# Patient Record
Sex: FEMALE | Race: BLACK OR AFRICAN AMERICAN | NOT HISPANIC OR LATINO | Employment: OTHER | URBAN - METROPOLITAN AREA
[De-identification: names, ages, dates, MRNs, and addresses within clinical notes are randomized per-mention and may not be internally consistent; named-entity substitution may affect disease eponyms.]

---

## 2018-04-11 DIAGNOSIS — G72.9 CERVICAL MYOPATHY: Primary | ICD-10-CM

## 2018-04-11 DIAGNOSIS — Z12.39 BREAST CANCER SCREENING: Primary | ICD-10-CM

## 2018-04-18 ENCOUNTER — HOSPITAL ENCOUNTER (OUTPATIENT)
Dept: RADIOLOGY | Facility: HOSPITAL | Age: 78
Discharge: HOME | End: 2018-04-18
Attending: INTERNAL MEDICINE
Payer: MEDICARE

## 2018-04-18 DIAGNOSIS — G72.9 CERVICAL MYOPATHY: ICD-10-CM

## 2018-04-18 DIAGNOSIS — Z12.39 BREAST CANCER SCREENING: ICD-10-CM

## 2018-04-18 PROCEDURE — 77067 SCR MAMMO BI INCL CAD: CPT | Mod: TC

## 2018-04-18 PROCEDURE — 72050 X-RAY EXAM NECK SPINE 4/5VWS: CPT | Mod: TC,FY

## 2018-04-18 PROCEDURE — 72050 X-RAY EXAM NECK SPINE 4/5VWS: CPT | Mod: 26,,, | Performed by: RADIOLOGY

## 2018-04-18 PROCEDURE — 77067 SCR MAMMO BI INCL CAD: CPT | Mod: 26,,, | Performed by: RADIOLOGY

## 2018-04-18 PROCEDURE — 77063 BREAST TOMOSYNTHESIS BI: CPT | Mod: 26,,, | Performed by: RADIOLOGY

## 2019-04-12 DIAGNOSIS — Z12.39 SCREENING BREAST EXAMINATION: Primary | ICD-10-CM

## 2019-04-23 ENCOUNTER — HOSPITAL ENCOUNTER (OUTPATIENT)
Dept: RADIOLOGY | Facility: HOSPITAL | Age: 79
Discharge: HOME | End: 2019-04-23
Attending: INTERNAL MEDICINE
Payer: MEDICARE

## 2019-04-23 DIAGNOSIS — Z12.39 SCREENING BREAST EXAMINATION: ICD-10-CM

## 2019-04-23 PROCEDURE — 77067 SCR MAMMO BI INCL CAD: CPT | Mod: TC

## 2019-04-23 PROCEDURE — 77067 MAMMO DIGITAL SCREENING BILAT WITH CAD: ICD-10-PCS | Mod: 26,,, | Performed by: RADIOLOGY

## 2019-04-23 PROCEDURE — 77067 SCR MAMMO BI INCL CAD: CPT | Mod: 26,,, | Performed by: RADIOLOGY

## 2020-06-23 ENCOUNTER — LAB VISIT (OUTPATIENT)
Dept: PRIMARY CARE CLINIC | Facility: OTHER | Age: 80
End: 2020-06-23
Payer: MEDICARE

## 2020-06-23 DIAGNOSIS — Z03.818 ENCOUNTER FOR OBSERVATION FOR SUSPECTED EXPOSURE TO OTHER BIOLOGICAL AGENTS RULED OUT: ICD-10-CM

## 2020-06-23 PROCEDURE — U0003 INFECTIOUS AGENT DETECTION BY NUCLEIC ACID (DNA OR RNA); SEVERE ACUTE RESPIRATORY SYNDROME CORONAVIRUS 2 (SARS-COV-2) (CORONAVIRUS DISEASE [COVID-19]), AMPLIFIED PROBE TECHNIQUE, MAKING USE OF HIGH THROUGHPUT TECHNOLOGIES AS DESCRIBED BY CMS-2020-01-R: HCPCS

## 2020-06-25 ENCOUNTER — OFFICE VISIT (OUTPATIENT)
Dept: FAMILY MEDICINE | Facility: HOSPITAL | Age: 80
End: 2020-06-25
Attending: SPECIALIST
Payer: MEDICARE

## 2020-06-25 VITALS
SYSTOLIC BLOOD PRESSURE: 157 MMHG | HEART RATE: 96 BPM | BODY MASS INDEX: 32.1 KG/M2 | DIASTOLIC BLOOD PRESSURE: 86 MMHG | HEIGHT: 61 IN | WEIGHT: 170 LBS

## 2020-06-25 DIAGNOSIS — M19.012 LOCALIZED OSTEOARTHRITIS OF SHOULDER REGIONS, BILATERAL: ICD-10-CM

## 2020-06-25 DIAGNOSIS — E66.3 OVERWEIGHT: ICD-10-CM

## 2020-06-25 DIAGNOSIS — Z12.31 ENCOUNTER FOR SCREENING MAMMOGRAM FOR MALIGNANT NEOPLASM OF BREAST: Primary | ICD-10-CM

## 2020-06-25 DIAGNOSIS — M19.011 LOCALIZED OSTEOARTHRITIS OF SHOULDER REGIONS, BILATERAL: ICD-10-CM

## 2020-06-25 DIAGNOSIS — Z00.00 ANNUAL PHYSICAL EXAM: ICD-10-CM

## 2020-06-25 DIAGNOSIS — R68.89 OTHER GENERAL SYMPTOMS AND SIGNS: ICD-10-CM

## 2020-06-25 PROCEDURE — 99213 OFFICE O/P EST LOW 20 MIN: CPT | Performed by: STUDENT IN AN ORGANIZED HEALTH CARE EDUCATION/TRAINING PROGRAM

## 2020-06-25 RX ORDER — CLOTRIMAZOLE AND BETAMETHASONE DIPROPIONATE 10; .5 MG/ML; MG/ML
LOTION TOPICAL 2 TIMES DAILY
COMMUNITY
End: 2022-03-07

## 2020-06-25 RX ORDER — CELECOXIB 100 MG/1
100 CAPSULE ORAL 2 TIMES DAILY
COMMUNITY
End: 2020-06-25 | Stop reason: SDUPTHER

## 2020-06-25 RX ORDER — ASPIRIN 81 MG/1
81 TABLET ORAL DAILY
COMMUNITY

## 2020-06-25 RX ORDER — CELECOXIB 100 MG/1
100 CAPSULE ORAL 2 TIMES DAILY
Qty: 180 CAPSULE | Refills: 3 | Status: SHIPPED | OUTPATIENT
Start: 2020-06-25 | End: 2021-03-09

## 2020-06-25 NOTE — PROGRESS NOTES
Subjective:       Patient ID: Kassandra Rodriguez is a 79 y.o. female.    Chief Complaint: breast cancer screening    80 yo female with bilateral shoulder osteoarthritis presenting for mammogram orders. Patient was a patient of Dr. Laughlin but is transferring as Dr. Laughlin is moving to [facility] and patient would like to remain in [place]. Patient takes Celebrex daily and it is the only medication that improves her arthritis pain in her bilateral shoulders. 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     Patient complains about urinary incontinence. Patient has to wear pads due to incontinence. Patient also reports a rash in her inner labial area that Dr. Laughlin started a fungal cream on.     Colonoscopy: Patient reports last colonoscopy was normal and GI cleared her  Mammogram: No abnormal mammogram, patient would like to continue to have have mammograms  Pap Smear: No history of abnormal pap    Not currently sexually active since her  passed away  Has 7 children all .     Review of Systems   Constitutional: Negative for chills, fatigue and fever.   HENT: Negative for rhinorrhea and sinus pain.    Eyes: Negative for photophobia and visual disturbance.   Respiratory: Negative for cough and shortness of breath.    Cardiovascular: Negative for chest pain, palpitations and leg swelling.   Gastrointestinal: Negative for abdominal pain, constipation, diarrhea, nausea and vomiting.   Endocrine: Negative for polyuria.   Genitourinary: Negative for dysuria, frequency, hematuria and urgency.   Musculoskeletal: Negative for arthralgias and gait problem.   Skin: Negative for rash.   Neurological: Negative for syncope, weakness and headaches.   Psychiatric/Behavioral: Negative for agitation and confusion.       Objective:      Vitals:    20 1324   BP: (!) 157/86   Pulse: 96     Physical Exam  Vitals signs and nursing note reviewed. Exam conducted with a  chaperone present.   Constitutional:       Appearance: She is well-developed.   HENT:      Head: Normocephalic and atraumatic.   Eyes:      Conjunctiva/sclera: Conjunctivae normal.      Pupils: Pupils are equal, round, and reactive to light.   Neck:      Musculoskeletal: Normal range of motion and neck supple.   Cardiovascular:      Rate and Rhythm: Normal rate and regular rhythm.   Pulmonary:      Effort: Pulmonary effort is normal. No respiratory distress.      Breath sounds: Normal breath sounds. No stridor. No wheezing.   Abdominal:      General: Bowel sounds are normal. There is no distension.      Palpations: Abdomen is soft. There is no mass.      Tenderness: There is no abdominal tenderness. There is no guarding.   Genitourinary:     Labia:         Right: No rash, tenderness or injury.         Left: No rash or tenderness.    Musculoskeletal: Normal range of motion.         General: No tenderness or deformity.   Skin:     General: Skin is warm and dry.      Capillary Refill: Capillary refill takes less than 2 seconds.   Neurological:      Mental Status: She is alert and oriented to person, place, and time.         Assessment:       1. Encounter for screening mammogram for malignant neoplasm of breast     2. Localized osteoarthritis of shoulder regions, bilateral    3. Annual physical exam    4. Body mass index (bmi) 32.0-32.9, adult     5. Other general symptoms and signs     6. Overweight         Plan:       Encounter for screening mammogram for malignant neoplasm of breast   -     Mammo Digital Screening Bilateral With CAD; Future; Expected date: 06/25/2020    Localized osteoarthritis of shoulder regions, bilateral  -     celecoxib (CELEBREX) 100 MG capsule; Take 1 capsule (100 mg total) by mouth 2 (two) times daily.  Dispense: 180 capsule; Refill: 3    Annual physical exam  -     CBC auto differential; Future; Expected date: 06/25/2020  -     Comprehensive metabolic panel; Future; Expected date:  06/25/2020  -     TSH; Future; Expected date: 06/25/2020  -     Lipid Panel; Future; Expected date: 06/25/2020    Body mass index (bmi) 32.0-32.9, adult   -     CBC auto differential; Future; Expected date: 06/25/2020    Other general symptoms and signs   -     TSH; Future; Expected date: 06/25/2020    Overweight   -     Lipid Panel; Future; Expected date: 06/25/2020      Follow up in about 6 months (around 12/25/2020). Discussed extensively the risk associated with NSAIDs use in the patient's age population. Patient would like to continue NSAIDs and reports that she eats prior to taking her medication. Patient will have labs completed before next appointment. No rashes were seen on vaginal exam. Patient most likely just has atrophic vaginitis. Discussed with patient to discontinue the ointment and if she has a rash again to return to clinic.

## 2020-06-26 LAB — SARS-COV-2 RNA RESP QL NAA+PROBE: NOT DETECTED

## 2020-07-15 ENCOUNTER — HOSPITAL ENCOUNTER (OUTPATIENT)
Dept: RADIOLOGY | Facility: HOSPITAL | Age: 80
Discharge: HOME | End: 2020-07-15
Attending: STUDENT IN AN ORGANIZED HEALTH CARE EDUCATION/TRAINING PROGRAM
Payer: MEDICARE

## 2020-07-15 DIAGNOSIS — Z12.31 ENCOUNTER FOR SCREENING MAMMOGRAM FOR MALIGNANT NEOPLASM OF BREAST: ICD-10-CM

## 2020-07-15 PROCEDURE — 77067 SCR MAMMO BI INCL CAD: CPT | Mod: TC

## 2020-07-15 PROCEDURE — 77067 SCR MAMMO BI INCL CAD: CPT | Mod: 26,,, | Performed by: RADIOLOGY

## 2020-07-15 PROCEDURE — 77063 MAMMO DIGITAL SCREENING BILAT WITH TOMOSYNTHESIS_CAD: ICD-10-PCS | Mod: 26,,, | Performed by: RADIOLOGY

## 2020-07-15 PROCEDURE — 77063 BREAST TOMOSYNTHESIS BI: CPT | Mod: 26,,, | Performed by: RADIOLOGY

## 2020-07-15 PROCEDURE — 77067 MAMMO DIGITAL SCREENING BILAT WITH TOMOSYNTHESIS_CAD: ICD-10-PCS | Mod: 26,,, | Performed by: RADIOLOGY

## 2020-08-11 ENCOUNTER — TELEPHONE (OUTPATIENT)
Dept: FAMILY MEDICINE | Facility: HOSPITAL | Age: 80
End: 2020-08-11

## 2020-08-11 DIAGNOSIS — M81.0 AGE-RELATED OSTEOPOROSIS WITHOUT CURRENT PATHOLOGICAL FRACTURE: ICD-10-CM

## 2020-08-11 DIAGNOSIS — Z13.820 OSTEOPOROSIS SCREENING: Primary | ICD-10-CM

## 2020-08-11 NOTE — TELEPHONE ENCOUNTER
----- Message from Sara Greer sent at 8/11/2020  9:35 AM CDT -----  Pt would like a bone density test done. Please put in order and call pt back [phone]

## 2020-08-14 ENCOUNTER — HOSPITAL ENCOUNTER (OUTPATIENT)
Dept: RADIOLOGY | Facility: HOSPITAL | Age: 80
Discharge: HOME | End: 2020-08-14
Attending: STUDENT IN AN ORGANIZED HEALTH CARE EDUCATION/TRAINING PROGRAM
Payer: MEDICARE

## 2020-08-14 DIAGNOSIS — M81.0 AGE-RELATED OSTEOPOROSIS WITHOUT CURRENT PATHOLOGICAL FRACTURE: ICD-10-CM

## 2020-08-14 DIAGNOSIS — Z13.820 OSTEOPOROSIS SCREENING: ICD-10-CM

## 2020-08-14 PROCEDURE — 77080 DXA BONE DENSITY AXIAL: CPT | Mod: TC

## 2020-08-14 PROCEDURE — 77080 DXA BONE DENSITY AXIAL: CPT | Mod: 26,,, | Performed by: RADIOLOGY

## 2020-08-14 PROCEDURE — 77080 DEXA BONE DENSITY SPINE HIP: ICD-10-PCS | Mod: 26,,, | Performed by: RADIOLOGY

## 2020-08-31 ENCOUNTER — TELEPHONE (OUTPATIENT)
Dept: FAMILY MEDICINE | Facility: HOSPITAL | Age: 80
End: 2020-08-31

## 2020-08-31 NOTE — TELEPHONE ENCOUNTER
I called patient with DEXA scan results showing osteopenia. Discussed started bisphosphonate vs Vitamin D and Calcium. Patient will start Vitamin D and Calcium. Will hold bisphosphonate for now.

## 2020-10-15 ENCOUNTER — OFFICE VISIT (OUTPATIENT)
Dept: FAMILY MEDICINE | Facility: HOSPITAL | Age: 80
End: 2020-10-15
Payer: MEDICARE

## 2020-10-15 VITALS
WEIGHT: 157.44 LBS | SYSTOLIC BLOOD PRESSURE: 133 MMHG | DIASTOLIC BLOOD PRESSURE: 76 MMHG | BODY MASS INDEX: 29.72 KG/M2 | HEART RATE: 83 BPM | HEIGHT: 61 IN

## 2020-10-15 DIAGNOSIS — H66.91 ACUTE INFECTION OF RIGHT EAR: Primary | ICD-10-CM

## 2020-10-15 PROCEDURE — 99213 OFFICE O/P EST LOW 20 MIN: CPT | Performed by: STUDENT IN AN ORGANIZED HEALTH CARE EDUCATION/TRAINING PROGRAM

## 2020-10-15 RX ORDER — AZITHROMYCIN 250 MG/1
TABLET, FILM COATED ORAL
Qty: 6 TABLET | Refills: 0 | Status: SHIPPED | OUTPATIENT
Start: 2020-10-15 | End: 2021-03-09

## 2020-10-15 RX ORDER — INFLUENZA A VIRUS A/MICHIGAN/45/2015 X-275 (H1N1) ANTIGEN (FORMALDEHYDE INACTIVATED), INFLUENZA A VIRUS A/SINGAPORE/INFIMH-16-0019/2016 IVR-186 (H3N2) ANTIGEN (FORMALDEHYDE INACTIVATED), INFLUENZA B VIRUS B/PHUKET/3073/2013 ANTIGEN (FORMALDEHYDE INACTIVATED), AND INFLUENZA B VIRUS B/MARYLAND/15/2016 BX-69A ANTIGEN (FORMALDEHYDE INACTIVATED) 60; 60; 60; 60 UG/.7ML; UG/.7ML; UG/.7ML; UG/.7ML
INJECTION, SUSPENSION INTRAMUSCULAR
COMMUNITY
Start: 2020-09-09 | End: 2021-03-09

## 2020-10-15 RX ORDER — METHYLPREDNISOLONE 4 MG/1
TABLET ORAL
Qty: 1 PACKAGE | Refills: 0 | Status: SHIPPED | OUTPATIENT
Start: 2020-10-15 | End: 2021-03-09

## 2021-01-09 ENCOUNTER — IMMUNIZATION (OUTPATIENT)
Dept: INTERNAL MEDICINE | Facility: CLINIC | Age: 81
End: 2021-01-09
Payer: MEDICARE

## 2021-01-09 DIAGNOSIS — Z23 NEED FOR VACCINATION: ICD-10-CM

## 2021-01-09 PROCEDURE — 91300 COVID-19, MRNA, LNP-S, PF, 30 MCG/0.3 ML DOSE VACCINE: CPT | Mod: PBBFAC | Performed by: FAMILY MEDICINE

## 2021-01-30 ENCOUNTER — IMMUNIZATION (OUTPATIENT)
Dept: INTERNAL MEDICINE | Facility: CLINIC | Age: 81
End: 2021-01-30
Payer: MEDICARE

## 2021-01-30 DIAGNOSIS — Z23 NEED FOR VACCINATION: Primary | ICD-10-CM

## 2021-01-30 PROCEDURE — 0002A COVID-19, MRNA, LNP-S, PF, 30 MCG/0.3 ML DOSE VACCINE: CPT | Mod: PBBFAC | Performed by: FAMILY MEDICINE

## 2021-01-30 PROCEDURE — 91300 COVID-19, MRNA, LNP-S, PF, 30 MCG/0.3 ML DOSE VACCINE: CPT | Mod: PBBFAC | Performed by: FAMILY MEDICINE

## 2021-02-11 ENCOUNTER — LAB VISIT (OUTPATIENT)
Dept: LAB | Facility: HOSPITAL | Age: 81
End: 2021-02-11
Attending: STUDENT IN AN ORGANIZED HEALTH CARE EDUCATION/TRAINING PROGRAM
Payer: MEDICARE

## 2021-02-11 DIAGNOSIS — R68.89 OTHER GENERAL SYMPTOMS AND SIGNS: ICD-10-CM

## 2021-02-11 DIAGNOSIS — Z00.00 ANNUAL PHYSICAL EXAM: ICD-10-CM

## 2021-02-11 DIAGNOSIS — E66.3 OVERWEIGHT: ICD-10-CM

## 2021-02-11 LAB
ALBUMIN SERPL BCP-MCNC: 3.5 G/DL (ref 3.5–5.2)
ALP SERPL-CCNC: 81 U/L (ref 55–135)
ALT SERPL W/O P-5'-P-CCNC: 14 U/L (ref 10–44)
ANION GAP SERPL CALC-SCNC: 8 MMOL/L (ref 8–16)
AST SERPL-CCNC: 19 U/L (ref 10–40)
BASOPHILS # BLD AUTO: 0.07 K/UL (ref 0–0.2)
BASOPHILS NFR BLD: 1.1 % (ref 0–1.9)
BILIRUB SERPL-MCNC: 0.4 MG/DL (ref 0.1–1)
BUN SERPL-MCNC: 12 MG/DL (ref 8–23)
CALCIUM SERPL-MCNC: 8.8 MG/DL (ref 8.7–10.5)
CHLORIDE SERPL-SCNC: 106 MMOL/L (ref 95–110)
CHOLEST SERPL-MCNC: 215 MG/DL (ref 120–199)
CHOLEST/HDLC SERPL: 4.5 {RATIO} (ref 2–5)
CO2 SERPL-SCNC: 27 MMOL/L (ref 23–29)
CREAT SERPL-MCNC: 0.7 MG/DL (ref 0.5–1.4)
DIFFERENTIAL METHOD: ABNORMAL
EOSINOPHIL # BLD AUTO: 0.4 K/UL (ref 0–0.5)
EOSINOPHIL NFR BLD: 6 % (ref 0–8)
ERYTHROCYTE [DISTWIDTH] IN BLOOD BY AUTOMATED COUNT: 13.6 % (ref 11.5–14.5)
EST. GFR  (AFRICAN AMERICAN): >60 ML/MIN/1.73 M^2
EST. GFR  (NON AFRICAN AMERICAN): >60 ML/MIN/1.73 M^2
GLUCOSE SERPL-MCNC: 87 MG/DL (ref 70–110)
HCT VFR BLD AUTO: 40.4 % (ref 37–48.5)
HDLC SERPL-MCNC: 48 MG/DL (ref 40–75)
HDLC SERPL: 22.3 % (ref 20–50)
HGB BLD-MCNC: 13.3 G/DL (ref 12–16)
IMM GRANULOCYTES # BLD AUTO: 0.02 K/UL (ref 0–0.04)
IMM GRANULOCYTES NFR BLD AUTO: 0.3 % (ref 0–0.5)
LDLC SERPL CALC-MCNC: 153 MG/DL (ref 63–159)
LYMPHOCYTES # BLD AUTO: 3.5 K/UL (ref 1–4.8)
LYMPHOCYTES NFR BLD: 54 % (ref 18–48)
MCH RBC QN AUTO: 32.3 PG (ref 27–31)
MCHC RBC AUTO-ENTMCNC: 32.9 G/DL (ref 32–36)
MCV RBC AUTO: 98 FL (ref 82–98)
MONOCYTES # BLD AUTO: 0.6 K/UL (ref 0.3–1)
MONOCYTES NFR BLD: 9.9 % (ref 4–15)
NEUTROPHILS # BLD AUTO: 1.9 K/UL (ref 1.8–7.7)
NEUTROPHILS NFR BLD: 28.7 % (ref 38–73)
NONHDLC SERPL-MCNC: 167 MG/DL
NRBC BLD-RTO: 0 /100 WBC
PLATELET # BLD AUTO: 222 K/UL (ref 150–350)
PMV BLD AUTO: 10.9 FL (ref 9.2–12.9)
POTASSIUM SERPL-SCNC: 4.3 MMOL/L (ref 3.5–5.1)
PROT SERPL-MCNC: 8 G/DL (ref 6–8.4)
RBC # BLD AUTO: 4.12 M/UL (ref 4–5.4)
SODIUM SERPL-SCNC: 141 MMOL/L (ref 136–145)
TRIGL SERPL-MCNC: 70 MG/DL (ref 30–150)
TSH SERPL DL<=0.005 MIU/L-ACNC: 1.05 UIU/ML (ref 0.4–4)
WBC # BLD AUTO: 6.48 K/UL (ref 3.9–12.7)

## 2021-02-11 PROCEDURE — 80053 COMPREHEN METABOLIC PANEL: CPT

## 2021-02-11 PROCEDURE — 84443 ASSAY THYROID STIM HORMONE: CPT

## 2021-02-11 PROCEDURE — 80061 LIPID PANEL: CPT

## 2021-02-11 PROCEDURE — 36415 COLL VENOUS BLD VENIPUNCTURE: CPT

## 2021-02-11 PROCEDURE — 85025 COMPLETE CBC W/AUTO DIFF WBC: CPT

## 2021-03-04 ENCOUNTER — TELEPHONE (OUTPATIENT)
Dept: FAMILY MEDICINE | Facility: HOSPITAL | Age: 81
End: 2021-03-04

## 2021-03-09 ENCOUNTER — TELEPHONE (OUTPATIENT)
Dept: FAMILY MEDICINE | Facility: HOSPITAL | Age: 81
End: 2021-03-09

## 2021-03-09 ENCOUNTER — OFFICE VISIT (OUTPATIENT)
Dept: FAMILY MEDICINE | Facility: HOSPITAL | Age: 81
End: 2021-03-09
Attending: FAMILY MEDICINE
Payer: MEDICARE

## 2021-03-09 VITALS
BODY MASS INDEX: 29.76 KG/M2 | HEART RATE: 71 BPM | DIASTOLIC BLOOD PRESSURE: 82 MMHG | SYSTOLIC BLOOD PRESSURE: 140 MMHG | HEIGHT: 61 IN | WEIGHT: 157.63 LBS

## 2021-03-09 DIAGNOSIS — M19.011 LOCALIZED OSTEOARTHRITIS OF SHOULDER REGIONS, BILATERAL: ICD-10-CM

## 2021-03-09 DIAGNOSIS — M19.012 LOCALIZED OSTEOARTHRITIS OF SHOULDER REGIONS, BILATERAL: ICD-10-CM

## 2021-03-09 DIAGNOSIS — J30.1 SEASONAL ALLERGIC RHINITIS DUE TO POLLEN: Primary | ICD-10-CM

## 2021-03-09 DIAGNOSIS — H61.22 IMPACTED CERUMEN OF LEFT EAR: ICD-10-CM

## 2021-03-09 PROCEDURE — 99214 OFFICE O/P EST MOD 30 MIN: CPT | Performed by: STUDENT IN AN ORGANIZED HEALTH CARE EDUCATION/TRAINING PROGRAM

## 2021-03-09 RX ORDER — NAPROXEN 500 MG/1
500 TABLET ORAL 2 TIMES DAILY WITH MEALS
Qty: 60 TABLET | Refills: 3 | Status: SHIPPED | OUTPATIENT
Start: 2021-03-09 | End: 2021-04-08

## 2021-03-09 RX ORDER — FLUTICASONE PROPIONATE 50 MCG
1 SPRAY, SUSPENSION (ML) NASAL DAILY
Qty: 15.8 ML | Refills: 0 | Status: SHIPPED | OUTPATIENT
Start: 2021-03-09 | End: 2022-11-16 | Stop reason: SDUPTHER

## 2021-03-10 ENCOUNTER — TELEPHONE (OUTPATIENT)
Dept: OTOLARYNGOLOGY | Facility: CLINIC | Age: 81
End: 2021-03-10

## 2021-04-06 ENCOUNTER — OFFICE VISIT (OUTPATIENT)
Dept: OTOLARYNGOLOGY | Facility: CLINIC | Age: 81
End: 2021-04-06
Payer: MEDICARE

## 2021-04-06 ENCOUNTER — CLINICAL SUPPORT (OUTPATIENT)
Dept: OTOLARYNGOLOGY | Facility: CLINIC | Age: 81
End: 2021-04-06
Payer: MEDICARE

## 2021-04-06 VITALS
DIASTOLIC BLOOD PRESSURE: 83 MMHG | HEART RATE: 80 BPM | HEIGHT: 61 IN | BODY MASS INDEX: 29.97 KG/M2 | SYSTOLIC BLOOD PRESSURE: 155 MMHG | TEMPERATURE: 97 F | WEIGHT: 158.75 LBS

## 2021-04-06 DIAGNOSIS — H90.3 SENSORINEURAL HEARING LOSS (SNHL), BILATERAL: Chronic | ICD-10-CM

## 2021-04-06 DIAGNOSIS — H60.313 CHRONIC DIFFUSE OTITIS EXTERNA OF BOTH EARS: Primary | Chronic | ICD-10-CM

## 2021-04-06 DIAGNOSIS — H61.23 BILATERAL IMPACTED CERUMEN: ICD-10-CM

## 2021-04-06 PROCEDURE — 1159F PR MEDICATION LIST DOCUMENTED IN MEDICAL RECORD: ICD-10-PCS | Mod: S$GLB,,, | Performed by: OTOLARYNGOLOGY

## 2021-04-06 PROCEDURE — 1159F MED LIST DOCD IN RCRD: CPT | Mod: S$GLB,,, | Performed by: OTOLARYNGOLOGY

## 2021-04-06 PROCEDURE — 1126F PR PAIN SEVERITY QUANTIFIED, NO PAIN PRESENT: ICD-10-PCS | Mod: S$GLB,,, | Performed by: OTOLARYNGOLOGY

## 2021-04-06 PROCEDURE — 3288F FALL RISK ASSESSMENT DOCD: CPT | Mod: CPTII,S$GLB,, | Performed by: OTOLARYNGOLOGY

## 2021-04-06 PROCEDURE — 99999 PR PBB SHADOW E&M-EST. PATIENT-LVL III: ICD-10-PCS | Mod: PBBFAC,,, | Performed by: OTOLARYNGOLOGY

## 2021-04-06 PROCEDURE — 1126F AMNT PAIN NOTED NONE PRSNT: CPT | Mod: S$GLB,,, | Performed by: OTOLARYNGOLOGY

## 2021-04-06 PROCEDURE — 1101F PT FALLS ASSESS-DOCD LE1/YR: CPT | Mod: CPTII,S$GLB,, | Performed by: OTOLARYNGOLOGY

## 2021-04-06 PROCEDURE — 99204 PR OFFICE/OUTPT VISIT, NEW, LEVL IV, 45-59 MIN: ICD-10-PCS | Mod: 25,S$GLB,, | Performed by: OTOLARYNGOLOGY

## 2021-04-06 PROCEDURE — 69210 REMOVE IMPACTED EAR WAX UNI: CPT | Mod: S$GLB,,, | Performed by: OTOLARYNGOLOGY

## 2021-04-06 PROCEDURE — 69210 PR REMOVAL IMPACTED CERUMEN REQUIRING INSTRUMENTATION, UNILATERAL: ICD-10-PCS | Mod: S$GLB,,, | Performed by: OTOLARYNGOLOGY

## 2021-04-06 PROCEDURE — 99204 OFFICE O/P NEW MOD 45 MIN: CPT | Mod: 25,S$GLB,, | Performed by: OTOLARYNGOLOGY

## 2021-04-06 PROCEDURE — 3288F PR FALLS RISK ASSESSMENT DOCUMENTED: ICD-10-PCS | Mod: CPTII,S$GLB,, | Performed by: OTOLARYNGOLOGY

## 2021-04-06 PROCEDURE — 99999 PR PBB SHADOW E&M-EST. PATIENT-LVL III: CPT | Mod: PBBFAC,,, | Performed by: OTOLARYNGOLOGY

## 2021-04-06 PROCEDURE — 1101F PR PT FALLS ASSESS DOC 0-1 FALLS W/OUT INJ PAST YR: ICD-10-PCS | Mod: CPTII,S$GLB,, | Performed by: OTOLARYNGOLOGY

## 2021-04-06 PROCEDURE — 92557 COMPREHENSIVE HEARING TEST: CPT | Mod: S$GLB,,, | Performed by: AUDIOLOGIST-HEARING AID FITTER

## 2021-04-06 PROCEDURE — 92567 TYMPANOMETRY: CPT | Mod: S$GLB,,, | Performed by: AUDIOLOGIST-HEARING AID FITTER

## 2021-04-06 PROCEDURE — 92557 PR COMPREHENSIVE HEARING TEST: ICD-10-PCS | Mod: S$GLB,,, | Performed by: AUDIOLOGIST-HEARING AID FITTER

## 2021-04-06 PROCEDURE — 92567 PR TYMPA2METRY: ICD-10-PCS | Mod: S$GLB,,, | Performed by: AUDIOLOGIST-HEARING AID FITTER

## 2021-05-03 NOTE — PROGRESS NOTES
Dr. Corey Martinez-    Please advise. I called and spoke with Lexus Lara. She is complaining of \"abdomen aches. Across lower abdomen. \" The pain started yesterday and will increase/decrease intermittently throughout the day. She rates the pain a 5/10. She did take Imodium as advised last week, which resulted in relief of diarrhea and loose stools completely. She has not had a bowel movement since taking (2 days ago.) She took a total of 4 tablets of Imodium last week throughout the span of 2 days. She is experiencing no other symptoms associated with the lower abdomen pain radiating across the LLQ-RLQ. Denies any nausea/vomiting. She is having difficulty producing stool sample, as she is now constipated from the previous doses of Imodium. I advised her to make sure she is drinking enough fluids & eat small meals if possible, as this can help move things along in the meantime. [facility]     Subjective:       Chief Complaint:   Chief Complaint   Patient presents with    Otalgia       History of Present Illness (HPI)  79-year-old female presents to clinic as an urgent visit for evaluation of right ear pain.  Patient reports postauricular, Pinna, and right forehead head pain.  No history of shingles outbreak.  However, patient reports pain and facial nerve dermatomal distribution and may represent early stages of possible Hollandale Hunt syndrome versus acute otitis media versus acute otitis externa.  No other acute complaints to address today.  Denies any systemic manifestations of an infectious etiology.  Denies any TIA/CVA signs/symptoms.  No alteration in her gait/balance.  Denies ear fullness, tenderness, or vertigo. Denies N/V/F/C/CP/SOB/HA.      Current Outpatient Medications   Medication Sig    aspirin (ECOTRIN) 81 MG EC tablet Take 81 mg by mouth once daily.    calcium-vitamin D 250-100 mg-unit per tablet Take 1 tablet by mouth 2 (two) times daily.    celecoxib (CELEBREX) 100 MG capsule Take 1 capsule (100 mg total) by mouth 2 (two) times daily.    clotrimazole-betamethasone (LOTRISONE) lotion Apply topically 2 (two) times daily.    FLUZONE HIGHDOSE QUAD 20-21  mcg/0.7 mL Syrg PHARMACIST ADMINISTERED IMMUNIZATION ADMINISTERED AT TIME OF DISPENSING    multivit-mins no.63/iron/folic (M-VIT ORAL) Take 1 tablet by mouth.    azithromycin (Z-LOVE) 250 MG tablet Take 2 tablets by mouth on day 1; Take 1 tablet by mouth on days 2-5    methylPREDNISolone (MEDROL DOSEPACK) 4 mg tablet use as directed     No current facility-administered medications for this visit.        No past medical history on file.    No past surgical history on file.    No family history on file.    Social History     Socioeconomic History    Marital status:      Spouse name: Not on file    Number of children: Not on file    Years of education: Not on file    Highest education level: Not  "on file   Occupational History    Not on file   Social Needs    Financial resource strain: Not on file    Food insecurity     Worry: Not on file     Inability: Not on file    Transportation needs     Medical: Not on file     Non-medical: Not on file   Tobacco Use    Smoking status: Not on file   Substance and Sexual Activity    Alcohol use: Not on file    Drug use: Not on file    Sexual activity: Not on file   Lifestyle    Physical activity     Days per week: Not on file     Minutes per session: Not on file    Stress: Not on file   Relationships    Social connections     Talks on phone: Not on file     Gets together: Not on file     Attends Denominational service: Not on file     Active member of club or organization: Not on file     Attends meetings of clubs or organizations: Not on file     Relationship status: Not on file   Other Topics Concern    Not on file   Social History Narrative    Not on file       The following portions of the patient's history were reviewed and updated as appropriate: allergies, current medications, past family history, past medical history, past social history, past surgical history and problem list.    Previous medical records reviewed and summarized above in HPI.    Review of Systems     10 point review of systems was conducted and only the pertinent positives and pertinent negatives are noted above in the HPI section.  Constitutional: Negative for fever and chills.  Positive fatigue/generalized malaise  Respiratory: Negative for cough and chest tightness.  Cardiovascular: Negative for chest pain and palpitations.  Gastrointestinal: Negative for constipation and diarrhea.  Genitourinary: Negative for dysuria and hematuria.   Skin: Negative for rash.  Neurological: Negative for light-headedness and headaches.   Psychiatric/Behavioral: Negative for SI/HI.          Objective:     Vitals:    10/15/20 1023   BP: 133/76   Pulse: 83   Weight: 71.4 kg (157 lb 6.5 oz)   Height: 5' 1" " (1.549 m)       Constitutional: AAOx3  NAD.   Head: Normocephalic and atraumatic.   Ears:  Right ear: Evidence of external auditory meatus/canal irritation with erythema and cerumen.  Evidence of mild otitis media without purulence appreciated.  Evidence of postnasal drip.  Eyes: EOMI grossly and PERRLA.  Neck:  Neck supple.   Cardiovascular: Normal rate and intact distal pulses.   Pulmonary/Chest: No acute respiratory distress. CTAB.  Abdominal: Soft. Bowel sounds are present. No rebound/guarding. No peritoneal signs.   Musculoskeletal:  Exhibits no edema.   Neurological: No focal neurological deficit appreciated from baseline.  Neuro:   A&O x3  Language: No Dysarthria, No aphasia   CN II-XII: PERRLA, V1-V3 intact B/L w/o deficit, No smile asymmetry, tongue midline and uvula w/o deviation   Motor: LUE strength: 5/5, RUE strength: 5/5, LLE strength: 5/5, RLE strength: 5/5  Cerebellar: no dysmetria present on exam   DTR: Appear symmetry in B/L UE (biceps, brachioradialis and triceps)    Plantar response: down going bilaterally   Sensory: grossly intact to light touch to both b/l UE and b/l LE  Skin: Skin is warm and dry.  The patient is not diaphoretic.   Psychiatric:  Mood and affect are congruent.  Nursing note and vitals reviewed.       Assessment/Plan:          Problem List Items Addressed This Visit        ENT    Acute infection of right ear - Primary    Current Assessment & Plan     Continue current medical management              Case discussed with attending physician, Dr. Foster     Disclaimer: This note has been generated using voice-recognition software. There may be typographical errors that have been missed during proof-reading.    Christo Nance Jr., D.O.  [facility],  [place], -[address]  [facility]

## 2021-05-26 DIAGNOSIS — K92.1 TARRY STOOLS: Primary | ICD-10-CM

## 2021-05-27 ENCOUNTER — HOSPITAL ENCOUNTER (OUTPATIENT)
Dept: RADIOLOGY | Facility: HOSPITAL | Age: 81
Discharge: HOME | End: 2021-05-27
Attending: STUDENT IN AN ORGANIZED HEALTH CARE EDUCATION/TRAINING PROGRAM
Payer: MEDICARE

## 2021-05-27 ENCOUNTER — OFFICE VISIT (OUTPATIENT)
Dept: FAMILY MEDICINE | Facility: HOSPITAL | Age: 81
End: 2021-05-27
Payer: MEDICARE

## 2021-05-27 VITALS
DIASTOLIC BLOOD PRESSURE: 76 MMHG | BODY MASS INDEX: 28.59 KG/M2 | SYSTOLIC BLOOD PRESSURE: 136 MMHG | HEART RATE: 88 BPM | WEIGHT: 151.44 LBS | HEIGHT: 61 IN

## 2021-05-27 DIAGNOSIS — R61 DIAPHORESIS: Primary | ICD-10-CM

## 2021-05-27 DIAGNOSIS — R61 DIAPHORESIS: ICD-10-CM

## 2021-05-27 DIAGNOSIS — M16.11 PRIMARY OSTEOARTHRITIS OF RIGHT HIP: ICD-10-CM

## 2021-05-27 PROCEDURE — 71046 X-RAY EXAM CHEST 2 VIEWS: CPT | Mod: 26,,, | Performed by: RADIOLOGY

## 2021-05-27 PROCEDURE — 71046 X-RAY EXAM CHEST 2 VIEWS: CPT | Mod: TC,FY

## 2021-05-27 PROCEDURE — 71046 XR CHEST PA AND LATERAL: ICD-10-PCS | Mod: 26,,, | Performed by: RADIOLOGY

## 2021-05-27 PROCEDURE — 99214 OFFICE O/P EST MOD 30 MIN: CPT | Mod: 25 | Performed by: STUDENT IN AN ORGANIZED HEALTH CARE EDUCATION/TRAINING PROGRAM

## 2021-05-27 RX ORDER — NAPROXEN 500 MG/1
500 TABLET ORAL 2 TIMES DAILY WITH MEALS
COMMUNITY
Start: 2021-04-09 | End: 2021-05-27

## 2021-05-27 RX ORDER — CELECOXIB 100 MG/1
100 CAPSULE ORAL 2 TIMES DAILY PRN
Qty: 60 CAPSULE | Refills: 3 | Status: SHIPPED | OUTPATIENT
Start: 2021-05-27 | End: 2021-05-27 | Stop reason: SDUPTHER

## 2021-05-27 RX ORDER — CELECOXIB 100 MG/1
100 CAPSULE ORAL 2 TIMES DAILY PRN
Qty: 60 CAPSULE | Refills: 3 | Status: SHIPPED | OUTPATIENT
Start: 2021-05-27 | End: 2022-03-21 | Stop reason: SDUPTHER

## 2021-05-28 ENCOUNTER — TELEPHONE (OUTPATIENT)
Dept: FAMILY MEDICINE | Facility: HOSPITAL | Age: 81
End: 2021-05-28

## 2021-07-06 ENCOUNTER — TELEPHONE (OUTPATIENT)
Dept: FAMILY MEDICINE | Facility: HOSPITAL | Age: 81
End: 2021-07-06

## 2021-07-08 ENCOUNTER — TELEPHONE (OUTPATIENT)
Dept: FAMILY MEDICINE | Facility: HOSPITAL | Age: 81
End: 2021-07-08

## 2021-07-08 DIAGNOSIS — Z12.31 ENCOUNTER FOR SCREENING MAMMOGRAM FOR BREAST CANCER: Primary | ICD-10-CM

## 2021-07-09 ENCOUNTER — TELEPHONE (OUTPATIENT)
Dept: ADMINISTRATIVE | Facility: OTHER | Age: 81
End: 2021-07-09

## 2021-07-13 ENCOUNTER — OFFICE VISIT (OUTPATIENT)
Dept: FAMILY MEDICINE | Facility: CLINIC | Age: 81
End: 2021-07-13
Payer: MEDICARE

## 2021-07-13 VITALS
WEIGHT: 154.31 LBS | DIASTOLIC BLOOD PRESSURE: 88 MMHG | SYSTOLIC BLOOD PRESSURE: 160 MMHG | BODY MASS INDEX: 29.13 KG/M2 | OXYGEN SATURATION: 100 % | HEIGHT: 61 IN | HEART RATE: 81 BPM

## 2021-07-13 DIAGNOSIS — I10 ESSENTIAL HYPERTENSION: ICD-10-CM

## 2021-07-13 DIAGNOSIS — Z76.89 ESTABLISHING CARE WITH NEW DOCTOR, ENCOUNTER FOR: Primary | ICD-10-CM

## 2021-07-13 PROCEDURE — 99999 PR PBB SHADOW E&M-EST. PATIENT-LVL IV: ICD-10-PCS | Mod: PBBFAC,,, | Performed by: STUDENT IN AN ORGANIZED HEALTH CARE EDUCATION/TRAINING PROGRAM

## 2021-07-13 PROCEDURE — 99999 PR PBB SHADOW E&M-EST. PATIENT-LVL IV: CPT | Mod: PBBFAC,,, | Performed by: STUDENT IN AN ORGANIZED HEALTH CARE EDUCATION/TRAINING PROGRAM

## 2021-07-13 PROCEDURE — 99214 PR OFFICE/OUTPT VISIT, EST, LEVL IV, 30-39 MIN: ICD-10-PCS | Mod: S$GLB,,, | Performed by: STUDENT IN AN ORGANIZED HEALTH CARE EDUCATION/TRAINING PROGRAM

## 2021-07-13 PROCEDURE — 3288F FALL RISK ASSESSMENT DOCD: CPT | Mod: CPTII,S$GLB,, | Performed by: STUDENT IN AN ORGANIZED HEALTH CARE EDUCATION/TRAINING PROGRAM

## 2021-07-13 PROCEDURE — 99214 OFFICE O/P EST MOD 30 MIN: CPT | Mod: S$GLB,,, | Performed by: STUDENT IN AN ORGANIZED HEALTH CARE EDUCATION/TRAINING PROGRAM

## 2021-07-13 PROCEDURE — 1126F AMNT PAIN NOTED NONE PRSNT: CPT | Mod: S$GLB,,, | Performed by: STUDENT IN AN ORGANIZED HEALTH CARE EDUCATION/TRAINING PROGRAM

## 2021-07-13 PROCEDURE — 1126F PR PAIN SEVERITY QUANTIFIED, NO PAIN PRESENT: ICD-10-PCS | Mod: S$GLB,,, | Performed by: STUDENT IN AN ORGANIZED HEALTH CARE EDUCATION/TRAINING PROGRAM

## 2021-07-13 PROCEDURE — 1101F PT FALLS ASSESS-DOCD LE1/YR: CPT | Mod: CPTII,S$GLB,, | Performed by: STUDENT IN AN ORGANIZED HEALTH CARE EDUCATION/TRAINING PROGRAM

## 2021-07-13 PROCEDURE — 1101F PR PT FALLS ASSESS DOC 0-1 FALLS W/OUT INJ PAST YR: ICD-10-PCS | Mod: CPTII,S$GLB,, | Performed by: STUDENT IN AN ORGANIZED HEALTH CARE EDUCATION/TRAINING PROGRAM

## 2021-07-13 PROCEDURE — 1159F MED LIST DOCD IN RCRD: CPT | Mod: S$GLB,,, | Performed by: STUDENT IN AN ORGANIZED HEALTH CARE EDUCATION/TRAINING PROGRAM

## 2021-07-13 PROCEDURE — 1159F PR MEDICATION LIST DOCUMENTED IN MEDICAL RECORD: ICD-10-PCS | Mod: S$GLB,,, | Performed by: STUDENT IN AN ORGANIZED HEALTH CARE EDUCATION/TRAINING PROGRAM

## 2021-07-13 PROCEDURE — 3288F PR FALLS RISK ASSESSMENT DOCUMENTED: ICD-10-PCS | Mod: CPTII,S$GLB,, | Performed by: STUDENT IN AN ORGANIZED HEALTH CARE EDUCATION/TRAINING PROGRAM

## 2021-07-13 RX ORDER — AMLODIPINE BESYLATE 5 MG/1
5 TABLET ORAL DAILY
Qty: 90 TABLET | Refills: 0 | Status: SHIPPED | OUTPATIENT
Start: 2021-07-13 | End: 2021-10-19 | Stop reason: SDUPTHER

## 2021-07-17 ENCOUNTER — HOSPITAL ENCOUNTER (OUTPATIENT)
Dept: RADIOLOGY | Facility: HOSPITAL | Age: 81
Discharge: HOME | End: 2021-07-17
Attending: STUDENT IN AN ORGANIZED HEALTH CARE EDUCATION/TRAINING PROGRAM
Payer: MEDICARE

## 2021-07-17 DIAGNOSIS — Z12.31 ENCOUNTER FOR SCREENING MAMMOGRAM FOR BREAST CANCER: ICD-10-CM

## 2021-07-17 PROCEDURE — 77067 SCR MAMMO BI INCL CAD: CPT | Mod: 26,,, | Performed by: RADIOLOGY

## 2021-07-17 PROCEDURE — 77063 MAMMO DIGITAL SCREENING BILAT WITH TOMO: ICD-10-PCS | Mod: 26,,, | Performed by: RADIOLOGY

## 2021-07-17 PROCEDURE — 77067 SCR MAMMO BI INCL CAD: CPT | Mod: TC

## 2021-07-17 PROCEDURE — 77067 MAMMO DIGITAL SCREENING BILAT WITH TOMO: ICD-10-PCS | Mod: 26,,, | Performed by: RADIOLOGY

## 2021-07-17 PROCEDURE — 77063 BREAST TOMOSYNTHESIS BI: CPT | Mod: 26,,, | Performed by: RADIOLOGY

## 2021-08-12 ENCOUNTER — OFFICE VISIT (OUTPATIENT)
Dept: FAMILY MEDICINE | Facility: CLINIC | Age: 81
End: 2021-08-12
Payer: MEDICARE

## 2021-08-12 ENCOUNTER — LAB VISIT (OUTPATIENT)
Dept: LAB | Facility: HOSPITAL | Age: 81
End: 2021-08-12
Attending: STUDENT IN AN ORGANIZED HEALTH CARE EDUCATION/TRAINING PROGRAM
Payer: MEDICARE

## 2021-08-12 VITALS
OXYGEN SATURATION: 98 % | BODY MASS INDEX: 28.72 KG/M2 | WEIGHT: 152.13 LBS | DIASTOLIC BLOOD PRESSURE: 60 MMHG | HEIGHT: 61 IN | SYSTOLIC BLOOD PRESSURE: 120 MMHG | HEART RATE: 90 BPM

## 2021-08-12 DIAGNOSIS — I10 ESSENTIAL HYPERTENSION: Primary | ICD-10-CM

## 2021-08-12 DIAGNOSIS — I10 ESSENTIAL HYPERTENSION: ICD-10-CM

## 2021-08-12 LAB
ANION GAP SERPL CALC-SCNC: 11 MMOL/L (ref 8–16)
BUN SERPL-MCNC: 12 MG/DL (ref 8–23)
CALCIUM SERPL-MCNC: 9.6 MG/DL (ref 8.7–10.5)
CHLORIDE SERPL-SCNC: 104 MMOL/L (ref 95–110)
CO2 SERPL-SCNC: 25 MMOL/L (ref 23–29)
CREAT SERPL-MCNC: 0.7 MG/DL (ref 0.5–1.4)
EST. GFR  (AFRICAN AMERICAN): >60 ML/MIN/1.73 M^2
EST. GFR  (NON AFRICAN AMERICAN): >60 ML/MIN/1.73 M^2
GLUCOSE SERPL-MCNC: 80 MG/DL (ref 70–110)
POTASSIUM SERPL-SCNC: 4.1 MMOL/L (ref 3.5–5.1)
SODIUM SERPL-SCNC: 140 MMOL/L (ref 136–145)

## 2021-08-12 PROCEDURE — 99213 OFFICE O/P EST LOW 20 MIN: CPT | Mod: S$GLB,,, | Performed by: STUDENT IN AN ORGANIZED HEALTH CARE EDUCATION/TRAINING PROGRAM

## 2021-08-12 PROCEDURE — 1160F PR REVIEW ALL MEDS BY PRESCRIBER/CLIN PHARMACIST DOCUMENTED: ICD-10-PCS | Mod: CPTII,S$GLB,, | Performed by: STUDENT IN AN ORGANIZED HEALTH CARE EDUCATION/TRAINING PROGRAM

## 2021-08-12 PROCEDURE — 3078F PR MOST RECENT DIASTOLIC BLOOD PRESSURE < 80 MM HG: ICD-10-PCS | Mod: CPTII,S$GLB,, | Performed by: STUDENT IN AN ORGANIZED HEALTH CARE EDUCATION/TRAINING PROGRAM

## 2021-08-12 PROCEDURE — 1159F MED LIST DOCD IN RCRD: CPT | Mod: CPTII,S$GLB,, | Performed by: STUDENT IN AN ORGANIZED HEALTH CARE EDUCATION/TRAINING PROGRAM

## 2021-08-12 PROCEDURE — 1160F RVW MEDS BY RX/DR IN RCRD: CPT | Mod: CPTII,S$GLB,, | Performed by: STUDENT IN AN ORGANIZED HEALTH CARE EDUCATION/TRAINING PROGRAM

## 2021-08-12 PROCEDURE — 1126F AMNT PAIN NOTED NONE PRSNT: CPT | Mod: CPTII,S$GLB,, | Performed by: STUDENT IN AN ORGANIZED HEALTH CARE EDUCATION/TRAINING PROGRAM

## 2021-08-12 PROCEDURE — 99999 PR PBB SHADOW E&M-EST. PATIENT-LVL IV: CPT | Mod: PBBFAC,,, | Performed by: STUDENT IN AN ORGANIZED HEALTH CARE EDUCATION/TRAINING PROGRAM

## 2021-08-12 PROCEDURE — 1159F PR MEDICATION LIST DOCUMENTED IN MEDICAL RECORD: ICD-10-PCS | Mod: CPTII,S$GLB,, | Performed by: STUDENT IN AN ORGANIZED HEALTH CARE EDUCATION/TRAINING PROGRAM

## 2021-08-12 PROCEDURE — 80048 BASIC METABOLIC PNL TOTAL CA: CPT | Performed by: STUDENT IN AN ORGANIZED HEALTH CARE EDUCATION/TRAINING PROGRAM

## 2021-08-12 PROCEDURE — 3288F PR FALLS RISK ASSESSMENT DOCUMENTED: ICD-10-PCS | Mod: CPTII,S$GLB,, | Performed by: STUDENT IN AN ORGANIZED HEALTH CARE EDUCATION/TRAINING PROGRAM

## 2021-08-12 PROCEDURE — 3078F DIAST BP <80 MM HG: CPT | Mod: CPTII,S$GLB,, | Performed by: STUDENT IN AN ORGANIZED HEALTH CARE EDUCATION/TRAINING PROGRAM

## 2021-08-12 PROCEDURE — 1126F PR PAIN SEVERITY QUANTIFIED, NO PAIN PRESENT: ICD-10-PCS | Mod: CPTII,S$GLB,, | Performed by: STUDENT IN AN ORGANIZED HEALTH CARE EDUCATION/TRAINING PROGRAM

## 2021-08-12 PROCEDURE — 1101F PT FALLS ASSESS-DOCD LE1/YR: CPT | Mod: CPTII,S$GLB,, | Performed by: STUDENT IN AN ORGANIZED HEALTH CARE EDUCATION/TRAINING PROGRAM

## 2021-08-12 PROCEDURE — 36415 COLL VENOUS BLD VENIPUNCTURE: CPT | Mod: PO | Performed by: STUDENT IN AN ORGANIZED HEALTH CARE EDUCATION/TRAINING PROGRAM

## 2021-08-12 PROCEDURE — 99213 PR OFFICE/OUTPT VISIT, EST, LEVL III, 20-29 MIN: ICD-10-PCS | Mod: S$GLB,,, | Performed by: STUDENT IN AN ORGANIZED HEALTH CARE EDUCATION/TRAINING PROGRAM

## 2021-08-12 PROCEDURE — 99999 PR PBB SHADOW E&M-EST. PATIENT-LVL IV: ICD-10-PCS | Mod: PBBFAC,,, | Performed by: STUDENT IN AN ORGANIZED HEALTH CARE EDUCATION/TRAINING PROGRAM

## 2021-08-12 PROCEDURE — 1101F PR PT FALLS ASSESS DOC 0-1 FALLS W/OUT INJ PAST YR: ICD-10-PCS | Mod: CPTII,S$GLB,, | Performed by: STUDENT IN AN ORGANIZED HEALTH CARE EDUCATION/TRAINING PROGRAM

## 2021-08-12 PROCEDURE — 3288F FALL RISK ASSESSMENT DOCD: CPT | Mod: CPTII,S$GLB,, | Performed by: STUDENT IN AN ORGANIZED HEALTH CARE EDUCATION/TRAINING PROGRAM

## 2021-08-12 PROCEDURE — 3074F SYST BP LT 130 MM HG: CPT | Mod: CPTII,S$GLB,, | Performed by: STUDENT IN AN ORGANIZED HEALTH CARE EDUCATION/TRAINING PROGRAM

## 2021-08-12 PROCEDURE — 3074F PR MOST RECENT SYSTOLIC BLOOD PRESSURE < 130 MM HG: ICD-10-PCS | Mod: CPTII,S$GLB,, | Performed by: STUDENT IN AN ORGANIZED HEALTH CARE EDUCATION/TRAINING PROGRAM

## 2021-08-13 ENCOUNTER — TELEPHONE (OUTPATIENT)
Dept: FAMILY MEDICINE | Facility: CLINIC | Age: 81
End: 2021-08-13

## 2021-10-19 DIAGNOSIS — I10 ESSENTIAL HYPERTENSION: ICD-10-CM

## 2021-10-19 RX ORDER — AMLODIPINE BESYLATE 5 MG/1
5 TABLET ORAL DAILY
Qty: 90 TABLET | Refills: 0 | Status: SHIPPED | OUTPATIENT
Start: 2021-10-19 | End: 2022-02-07

## 2021-10-20 ENCOUNTER — TELEPHONE (OUTPATIENT)
Dept: FAMILY MEDICINE | Facility: CLINIC | Age: 81
End: 2021-10-20

## 2021-11-16 ENCOUNTER — OFFICE VISIT (OUTPATIENT)
Dept: INTERNAL MEDICINE | Facility: CLINIC | Age: 81
End: 2021-11-16
Payer: MEDICARE

## 2021-11-16 VITALS
WEIGHT: 153 LBS | OXYGEN SATURATION: 96 % | DIASTOLIC BLOOD PRESSURE: 78 MMHG | BODY MASS INDEX: 28.91 KG/M2 | SYSTOLIC BLOOD PRESSURE: 136 MMHG | HEART RATE: 83 BPM

## 2021-11-16 DIAGNOSIS — Z86.69 HISTORY OF GUILLAIN-BARRE SYNDROME: ICD-10-CM

## 2021-11-16 DIAGNOSIS — H91.93 DECREASED HEARING OF BOTH EARS: ICD-10-CM

## 2021-11-16 DIAGNOSIS — Z71.89 ADVANCE CARE PLANNING: ICD-10-CM

## 2021-11-16 DIAGNOSIS — J30.1 ALLERGIC RHINITIS DUE TO POLLEN, UNSPECIFIED SEASONALITY: ICD-10-CM

## 2021-11-16 DIAGNOSIS — Z23 NEED FOR VACCINATION: ICD-10-CM

## 2021-11-16 DIAGNOSIS — I10 ESSENTIAL HYPERTENSION: Primary | ICD-10-CM

## 2021-11-16 PROCEDURE — 99999 PR PBB SHADOW E&M-EST. PATIENT-LVL IV: CPT | Mod: PBBFAC,,, | Performed by: INTERNAL MEDICINE

## 2021-11-16 PROCEDURE — 3288F PR FALLS RISK ASSESSMENT DOCUMENTED: ICD-10-PCS | Mod: CPTII,S$GLB,, | Performed by: INTERNAL MEDICINE

## 2021-11-16 PROCEDURE — 1126F PR PAIN SEVERITY QUANTIFIED, NO PAIN PRESENT: ICD-10-PCS | Mod: CPTII,S$GLB,, | Performed by: INTERNAL MEDICINE

## 2021-11-16 PROCEDURE — 3078F PR MOST RECENT DIASTOLIC BLOOD PRESSURE < 80 MM HG: ICD-10-PCS | Mod: CPTII,S$GLB,, | Performed by: INTERNAL MEDICINE

## 2021-11-16 PROCEDURE — 1160F PR REVIEW ALL MEDS BY PRESCRIBER/CLIN PHARMACIST DOCUMENTED: ICD-10-PCS | Mod: CPTII,S$GLB,, | Performed by: INTERNAL MEDICINE

## 2021-11-16 PROCEDURE — 99999 PR PBB SHADOW E&M-EST. PATIENT-LVL IV: ICD-10-PCS | Mod: PBBFAC,,, | Performed by: INTERNAL MEDICINE

## 2021-11-16 PROCEDURE — 99214 OFFICE O/P EST MOD 30 MIN: CPT | Mod: S$GLB,,, | Performed by: INTERNAL MEDICINE

## 2021-11-16 PROCEDURE — 99214 PR OFFICE/OUTPT VISIT, EST, LEVL IV, 30-39 MIN: ICD-10-PCS | Mod: S$GLB,,, | Performed by: INTERNAL MEDICINE

## 2021-11-16 PROCEDURE — 3075F SYST BP GE 130 - 139MM HG: CPT | Mod: CPTII,S$GLB,, | Performed by: INTERNAL MEDICINE

## 2021-11-16 PROCEDURE — 1126F AMNT PAIN NOTED NONE PRSNT: CPT | Mod: CPTII,S$GLB,, | Performed by: INTERNAL MEDICINE

## 2021-11-16 PROCEDURE — 1159F PR MEDICATION LIST DOCUMENTED IN MEDICAL RECORD: ICD-10-PCS | Mod: CPTII,S$GLB,, | Performed by: INTERNAL MEDICINE

## 2021-11-16 PROCEDURE — 1101F PR PT FALLS ASSESS DOC 0-1 FALLS W/OUT INJ PAST YR: ICD-10-PCS | Mod: CPTII,S$GLB,, | Performed by: INTERNAL MEDICINE

## 2021-11-16 PROCEDURE — 1160F RVW MEDS BY RX/DR IN RCRD: CPT | Mod: CPTII,S$GLB,, | Performed by: INTERNAL MEDICINE

## 2021-11-16 PROCEDURE — 3078F DIAST BP <80 MM HG: CPT | Mod: CPTII,S$GLB,, | Performed by: INTERNAL MEDICINE

## 2021-11-16 PROCEDURE — 3288F FALL RISK ASSESSMENT DOCD: CPT | Mod: CPTII,S$GLB,, | Performed by: INTERNAL MEDICINE

## 2021-11-16 PROCEDURE — 3075F PR MOST RECENT SYSTOLIC BLOOD PRESS GE 130-139MM HG: ICD-10-PCS | Mod: CPTII,S$GLB,, | Performed by: INTERNAL MEDICINE

## 2021-11-16 PROCEDURE — 1159F MED LIST DOCD IN RCRD: CPT | Mod: CPTII,S$GLB,, | Performed by: INTERNAL MEDICINE

## 2021-11-16 PROCEDURE — 1101F PT FALLS ASSESS-DOCD LE1/YR: CPT | Mod: CPTII,S$GLB,, | Performed by: INTERNAL MEDICINE

## 2021-11-16 RX ORDER — MONTELUKAST SODIUM 10 MG/1
10 TABLET ORAL NIGHTLY
Qty: 30 TABLET | Refills: 0 | Status: SHIPPED | OUTPATIENT
Start: 2021-11-16 | End: 2021-12-16

## 2022-01-03 ENCOUNTER — PATIENT OUTREACH (OUTPATIENT)
Dept: ADMINISTRATIVE | Facility: OTHER | Age: 82
End: 2022-01-03
Payer: MEDICARE

## 2022-01-03 NOTE — PROGRESS NOTES
Health Maintenance Due   Topic Date Due    COVID-19 Vaccine (3 - Booster for Pfizer series) 07/30/2021    Influenza Vaccine (1) 09/01/2021     Updates were requested from care everywhere.  Chart was reviewed for overdue Proactive [facility] Encounters ([facility]) topics (CRS, Breast Cancer Screening, Eye exam)  Health Maintenance has been updated.  LINKS immunization registry triggered.  Immunizations were reconciled.

## 2022-01-04 ENCOUNTER — CLINICAL SUPPORT (OUTPATIENT)
Dept: OTOLARYNGOLOGY | Facility: CLINIC | Age: 82
End: 2022-01-04
Payer: MEDICARE

## 2022-01-04 ENCOUNTER — OFFICE VISIT (OUTPATIENT)
Dept: OTOLARYNGOLOGY | Facility: CLINIC | Age: 82
End: 2022-01-04
Payer: MEDICARE

## 2022-01-04 VITALS
HEART RATE: 70 BPM | HEIGHT: 61 IN | TEMPERATURE: 98 F | BODY MASS INDEX: 28.75 KG/M2 | SYSTOLIC BLOOD PRESSURE: 132 MMHG | WEIGHT: 152.25 LBS | DIASTOLIC BLOOD PRESSURE: 81 MMHG

## 2022-01-04 DIAGNOSIS — H93.13 TINNITUS AURIUM, BILATERAL: Chronic | ICD-10-CM

## 2022-01-04 DIAGNOSIS — H90.A22 SENSORINEURAL HEARING LOSS (SNHL) OF LEFT EAR WITH RESTRICTED HEARING OF RIGHT EAR: ICD-10-CM

## 2022-01-04 DIAGNOSIS — J30.1 ALLERGIC RHINITIS DUE TO POLLEN, UNSPECIFIED SEASONALITY: ICD-10-CM

## 2022-01-04 DIAGNOSIS — H90.A22 SENSORINEURAL HEARING LOSS (SNHL) OF LEFT EAR WITH RESTRICTED HEARING OF RIGHT EAR: Primary | Chronic | ICD-10-CM

## 2022-01-04 PROCEDURE — 92557 PR COMPREHENSIVE HEARING TEST: ICD-10-PCS | Mod: S$GLB,,, | Performed by: AUDIOLOGIST

## 2022-01-04 PROCEDURE — 1159F PR MEDICATION LIST DOCUMENTED IN MEDICAL RECORD: ICD-10-PCS | Mod: CPTII,S$GLB,, | Performed by: OTOLARYNGOLOGY

## 2022-01-04 PROCEDURE — 99999 PR PBB SHADOW E&M-EST. PATIENT-LVL III: ICD-10-PCS | Mod: PBBFAC,,, | Performed by: OTOLARYNGOLOGY

## 2022-01-04 PROCEDURE — 3079F DIAST BP 80-89 MM HG: CPT | Mod: CPTII,S$GLB,, | Performed by: OTOLARYNGOLOGY

## 2022-01-04 PROCEDURE — 3079F PR MOST RECENT DIASTOLIC BLOOD PRESSURE 80-89 MM HG: ICD-10-PCS | Mod: CPTII,S$GLB,, | Performed by: OTOLARYNGOLOGY

## 2022-01-04 PROCEDURE — 99999 PR PBB SHADOW E&M-EST. PATIENT-LVL I: CPT | Mod: PBBFAC,,, | Performed by: AUDIOLOGIST

## 2022-01-04 PROCEDURE — 99999 PR PBB SHADOW E&M-EST. PATIENT-LVL III: CPT | Mod: PBBFAC,,, | Performed by: OTOLARYNGOLOGY

## 2022-01-04 PROCEDURE — 3288F PR FALLS RISK ASSESSMENT DOCUMENTED: ICD-10-PCS | Mod: CPTII,S$GLB,, | Performed by: OTOLARYNGOLOGY

## 2022-01-04 PROCEDURE — 92557 COMPREHENSIVE HEARING TEST: CPT | Mod: S$GLB,,, | Performed by: AUDIOLOGIST

## 2022-01-04 PROCEDURE — 1159F MED LIST DOCD IN RCRD: CPT | Mod: CPTII,S$GLB,, | Performed by: OTOLARYNGOLOGY

## 2022-01-04 PROCEDURE — 92567 PR TYMPA2METRY: ICD-10-PCS | Mod: S$GLB,,, | Performed by: AUDIOLOGIST

## 2022-01-04 PROCEDURE — 1100F PR PT FALLS ASSESS DOC 2+ FALLS/FALL W/INJURY/YR: ICD-10-PCS | Mod: CPTII,S$GLB,, | Performed by: OTOLARYNGOLOGY

## 2022-01-04 PROCEDURE — 1126F PR PAIN SEVERITY QUANTIFIED, NO PAIN PRESENT: ICD-10-PCS | Mod: CPTII,S$GLB,, | Performed by: OTOLARYNGOLOGY

## 2022-01-04 PROCEDURE — 99999 PR PBB SHADOW E&M-EST. PATIENT-LVL I: ICD-10-PCS | Mod: PBBFAC,,, | Performed by: AUDIOLOGIST

## 2022-01-04 PROCEDURE — 99214 PR OFFICE/OUTPT VISIT, EST, LEVL IV, 30-39 MIN: ICD-10-PCS | Mod: S$GLB,,, | Performed by: OTOLARYNGOLOGY

## 2022-01-04 PROCEDURE — 3075F SYST BP GE 130 - 139MM HG: CPT | Mod: CPTII,S$GLB,, | Performed by: OTOLARYNGOLOGY

## 2022-01-04 PROCEDURE — 3075F PR MOST RECENT SYSTOLIC BLOOD PRESS GE 130-139MM HG: ICD-10-PCS | Mod: CPTII,S$GLB,, | Performed by: OTOLARYNGOLOGY

## 2022-01-04 PROCEDURE — 1100F PTFALLS ASSESS-DOCD GE2>/YR: CPT | Mod: CPTII,S$GLB,, | Performed by: OTOLARYNGOLOGY

## 2022-01-04 PROCEDURE — 92567 TYMPANOMETRY: CPT | Mod: S$GLB,,, | Performed by: AUDIOLOGIST

## 2022-01-04 PROCEDURE — 3288F FALL RISK ASSESSMENT DOCD: CPT | Mod: CPTII,S$GLB,, | Performed by: OTOLARYNGOLOGY

## 2022-01-04 PROCEDURE — 1126F AMNT PAIN NOTED NONE PRSNT: CPT | Mod: CPTII,S$GLB,, | Performed by: OTOLARYNGOLOGY

## 2022-01-04 PROCEDURE — 99214 OFFICE O/P EST MOD 30 MIN: CPT | Mod: S$GLB,,, | Performed by: OTOLARYNGOLOGY

## 2022-01-04 PROCEDURE — 1160F PR REVIEW ALL MEDS BY PRESCRIBER/CLIN PHARMACIST DOCUMENTED: ICD-10-PCS | Mod: CPTII,S$GLB,, | Performed by: OTOLARYNGOLOGY

## 2022-01-04 PROCEDURE — 1160F RVW MEDS BY RX/DR IN RCRD: CPT | Mod: CPTII,S$GLB,, | Performed by: OTOLARYNGOLOGY

## 2022-01-04 NOTE — PROGRESS NOTES
Chief Complaint   Patient presents with    Follow-up     No improvements with hearing since last test   .     HPI:  Mrs. Kassandra Rodriguez  is here to see me today for evaluation of hearing loss.  She has feeling of aural fullness over the last 7 years. She notes that she has tried hearing aids in the past but did not feel that they worked for her.  She notes intermittent tinnitus bilaterally worse on the left.   She has had some nasal congestion with some blood tinged mucus.       Past Medical History:   Diagnosis Date    Hearing loss     Hypertension      Social History     Socioeconomic History    Marital status:     Number of children: 7   Occupational History    Occupation: retired   Tobacco Use    Smoking status: Former Smoker     Packs/day: 0.50     Years: 20.00     Pack years: 10.00     Types: Cigarettes     Quit date:      Years since quittin.0    Smokeless tobacco: Never Used   Substance and Sexual Activity    Alcohol use: Not Currently    Drug use: Never    Sexual activity: Not Currently     Partners: Male   Social History Narrative    Social History    Work: Retired     Alcohol: Denies     Smoker: Past smoker     Exercise: Walk 2-3 times a week for 30 minutes.     Diet: General healthy diet.     Vitamins: Calcium Vitamin D     Dental visits: 2 months ago     No past surgical history on file.  No family history on file.        Review of Systems  General: negative for chills, fever or weight loss  Psychological: negative for mood changes or depression  Ophthalmic: negative for blurry vision, photophobia or eye pain  ENT: see HPI  Respiratory ROS: no cough, shortness of breath, or wheezing  Cardiovascular: no chest pain or dyspnea on exertion  Gastrointestinal ROS: no abdominal pain, change in bowel habits, or black/ bloody stools  Musculoskeletal ROS: negative for gait disturbance or muscular weakness  Neurological: no syncope or seizures; no ataxia  Dermatological: negative for  puritis,  rash and jaundice  Hematologic/lymphatic: no easy bruising, no new lumps or bumps      Physical Exam:    Vitals:    01/04/22 0949   BP: 132/81   Pulse: 70   Temp: 97.5 °F (36.4 °C)       Constitutional: Well appearing / communicating without difficutly.  NAD.  Eyes: EOM I Bilaterally  Head/Face: Normocephalic.  Negative paranasal sinus pressure/tenderness.  Salivary glands WNL.  House Brackmann I Bilaterally.    Right Ear: External ear normal and ear canal occluded. Decreased hearing is noted.   Left Ear: External ear normal and ear canal normal occluded. Decreased hearing is noted.   Bilateral complete cerumen impactions, removal described in a separate procedure note    Nose: No gross nasal septal deviation. Inferior Turbinates 3+ bilaterally. No septal perforation. No masses/lesions. External nasal skin appears normal without masses/lesions.  Oral Cavity: Gingiva/lips within normal limits.  Dentition/gingiva healthy appearing. Mucus membranes moist. Floor of mouth soft, no masses palpated. Oral Tongue mobile. Hard Palate appears normal.    Oropharynx: Base of tongue appears normal. No masses/lesions noted. Tonsillar fossa/pharyngeal wall without lesions. Posterior oropharynx WNL.  Soft palate without masses. Midline uvula.   Neck/Lymphatic: No LAD I-VI bilaterally.  No thyromegaly.  No masses noted on exam.        Diagnostic testing:  Audiogram interpreted personally by me and discussed in detail with the patient today.             Assessment:    ICD-10-CM ICD-9-CM    1. Sensorineural hearing loss (SNHL) of left ear with restricted hearing of right ear  H90.A22 389.22    2. Chronic diffuse otitis externa of both ears  H60.313 380.23    3. Allergic rhinitis due to pollen, unspecified seasonality  J30.1 477.0      The primary encounter diagnosis was Sensorineural hearing loss (SNHL) of left ear with restricted hearing of right ear. Diagnoses of Chronic diffuse otitis externa of both ears and Allergic  rhinitis due to pollen, unspecified seasonality were also pertinent to this visit.      Plan:  No orders of the defined types were placed in this encounter.     We reviewed the patient's recent audiogram and hearing loss in detail.  We also discussed that she is a good candidate for hearing aids, if and when she the patient is motivated.   We also discussed the use hearing protection when exposed to loud noise, including lawn equipment. Recommend audiogram in 1 year.   Continue Flonase 2 sprays per nostril daily  Recommend nasal saline rinses BID.       Renetta Chen MD

## 2022-01-06 ENCOUNTER — LAB VISIT (OUTPATIENT)
Dept: PRIMARY CARE CLINIC | Facility: CLINIC | Age: 82
End: 2022-01-06
Payer: MEDICARE

## 2022-01-06 DIAGNOSIS — Z20.822 CONTACT WITH AND (SUSPECTED) EXPOSURE TO COVID-19: ICD-10-CM

## 2022-01-06 LAB
CTP QC/QA: YES
SARS-COV-2 AG RESP QL IA.RAPID: NEGATIVE

## 2022-01-06 PROCEDURE — 87811 SARS-COV-2 COVID19 W/OPTIC: CPT

## 2022-03-07 ENCOUNTER — OFFICE VISIT (OUTPATIENT)
Dept: FAMILY MEDICINE | Facility: CLINIC | Age: 82
End: 2022-03-07
Payer: MEDICARE

## 2022-03-07 VITALS
WEIGHT: 152.13 LBS | SYSTOLIC BLOOD PRESSURE: 140 MMHG | HEART RATE: 80 BPM | BODY MASS INDEX: 28.72 KG/M2 | DIASTOLIC BLOOD PRESSURE: 80 MMHG | HEIGHT: 61 IN | OXYGEN SATURATION: 99 %

## 2022-03-07 DIAGNOSIS — I10 ESSENTIAL HYPERTENSION: ICD-10-CM

## 2022-03-07 DIAGNOSIS — N90.4 LICHEN SCLEROSUS ET ATROPHICUS OF THE VULVA: Primary | ICD-10-CM

## 2022-03-07 DIAGNOSIS — R82.90 MALODOROUS URINE: ICD-10-CM

## 2022-03-07 DIAGNOSIS — N95.2 ATROPHIC VAGINITIS: ICD-10-CM

## 2022-03-07 DIAGNOSIS — N76.1 CHRONIC VAGINITIS: ICD-10-CM

## 2022-03-07 PROBLEM — H66.91 ACUTE INFECTION OF RIGHT EAR: Status: RESOLVED | Noted: 2020-10-15 | Resolved: 2022-03-07

## 2022-03-07 PROCEDURE — 3079F PR MOST RECENT DIASTOLIC BLOOD PRESSURE 80-89 MM HG: ICD-10-PCS | Mod: CPTII,S$GLB,, | Performed by: FAMILY MEDICINE

## 2022-03-07 PROCEDURE — 1101F PT FALLS ASSESS-DOCD LE1/YR: CPT | Mod: CPTII,S$GLB,, | Performed by: FAMILY MEDICINE

## 2022-03-07 PROCEDURE — 87801 DETECT AGNT MULT DNA AMPLI: CPT | Performed by: FAMILY MEDICINE

## 2022-03-07 PROCEDURE — 3077F SYST BP >= 140 MM HG: CPT | Mod: CPTII,S$GLB,, | Performed by: FAMILY MEDICINE

## 2022-03-07 PROCEDURE — 1126F PR PAIN SEVERITY QUANTIFIED, NO PAIN PRESENT: ICD-10-PCS | Mod: CPTII,S$GLB,, | Performed by: FAMILY MEDICINE

## 2022-03-07 PROCEDURE — 99999 PR PBB SHADOW E&M-EST. PATIENT-LVL III: CPT | Mod: PBBFAC,,, | Performed by: FAMILY MEDICINE

## 2022-03-07 PROCEDURE — 87481 CANDIDA DNA AMP PROBE: CPT | Mod: 59 | Performed by: FAMILY MEDICINE

## 2022-03-07 PROCEDURE — 3288F PR FALLS RISK ASSESSMENT DOCUMENTED: ICD-10-PCS | Mod: CPTII,S$GLB,, | Performed by: FAMILY MEDICINE

## 2022-03-07 PROCEDURE — 3079F DIAST BP 80-89 MM HG: CPT | Mod: CPTII,S$GLB,, | Performed by: FAMILY MEDICINE

## 2022-03-07 PROCEDURE — 3288F FALL RISK ASSESSMENT DOCD: CPT | Mod: CPTII,S$GLB,, | Performed by: FAMILY MEDICINE

## 2022-03-07 PROCEDURE — 99213 PR OFFICE/OUTPT VISIT, EST, LEVL III, 20-29 MIN: ICD-10-PCS | Mod: S$GLB,,, | Performed by: FAMILY MEDICINE

## 2022-03-07 PROCEDURE — 1126F AMNT PAIN NOTED NONE PRSNT: CPT | Mod: CPTII,S$GLB,, | Performed by: FAMILY MEDICINE

## 2022-03-07 PROCEDURE — 1160F RVW MEDS BY RX/DR IN RCRD: CPT | Mod: CPTII,S$GLB,, | Performed by: FAMILY MEDICINE

## 2022-03-07 PROCEDURE — 99999 PR PBB SHADOW E&M-EST. PATIENT-LVL III: ICD-10-PCS | Mod: PBBFAC,,, | Performed by: FAMILY MEDICINE

## 2022-03-07 PROCEDURE — 1160F PR REVIEW ALL MEDS BY PRESCRIBER/CLIN PHARMACIST DOCUMENTED: ICD-10-PCS | Mod: CPTII,S$GLB,, | Performed by: FAMILY MEDICINE

## 2022-03-07 PROCEDURE — 3077F PR MOST RECENT SYSTOLIC BLOOD PRESSURE >= 140 MM HG: ICD-10-PCS | Mod: CPTII,S$GLB,, | Performed by: FAMILY MEDICINE

## 2022-03-07 PROCEDURE — 99213 OFFICE O/P EST LOW 20 MIN: CPT | Mod: S$GLB,,, | Performed by: FAMILY MEDICINE

## 2022-03-07 PROCEDURE — 1101F PR PT FALLS ASSESS DOC 0-1 FALLS W/OUT INJ PAST YR: ICD-10-PCS | Mod: CPTII,S$GLB,, | Performed by: FAMILY MEDICINE

## 2022-03-07 PROCEDURE — 1159F MED LIST DOCD IN RCRD: CPT | Mod: CPTII,S$GLB,, | Performed by: FAMILY MEDICINE

## 2022-03-07 PROCEDURE — 1159F PR MEDICATION LIST DOCUMENTED IN MEDICAL RECORD: ICD-10-PCS | Mod: CPTII,S$GLB,, | Performed by: FAMILY MEDICINE

## 2022-03-07 RX ORDER — CLOBETASOL PROPIONATE 0.5 MG/G
OINTMENT TOPICAL
Qty: 30 G | Refills: 0 | Status: SHIPPED | OUTPATIENT
Start: 2022-03-07 | End: 2022-03-21 | Stop reason: SDUPTHER

## 2022-03-07 NOTE — PROGRESS NOTES
Subjective:       Patient ID: Kassandra Rodriguez is a 81 y.o. female.    Chief Complaint: No chief complaint on file.    Patient Active Problem List   Diagnosis    Essential hypertension    History of Guillain-[place] syndrome    Atrophic vaginitis      HPI  82 yo female presents with vaginal irritation, intense itching and scratching, yellowish discharge x 2 months. Some mild odor to urine, possible burning when urine touches skin.   Uses Dove and Dial soap, douches every now and then per patient. Soaps in bath, epsom salt, bubble bath then also showers.  Not sexually active x 2-3 years.    Pap smears in the past normal.   Thinks she go there flu shot at [facility], but not sure, she will check and get her flu shot if she has not.     Review of Systems   All other systems reviewed and are negative.       Objective:     Vitals:    03/07/22 0730   BP: (!) 140/80   Pulse: 80        Physical Exam  Vitals reviewed. Exam conducted with a chaperone present.   Constitutional:       General: She is not in acute distress.     Appearance: Normal appearance. She is not ill-appearing, toxic-appearing or diaphoretic.   HENT:      Head: Normocephalic and atraumatic.   Eyes:      General: No scleral icterus.  Pulmonary:      Effort: Pulmonary effort is normal.   Genitourinary:     Exam position: Lithotomy position.      Pubic Area: No rash.       Labia:         Right: Rash present. No tenderness, lesion or injury.         Left: Rash present. No tenderness, lesion or injury.       Urethra: No prolapse, urethral pain, urethral swelling or urethral lesion.      Vagina: Normal. No lesions.      Cervix: No cervical motion tenderness, lesion or erythema.      Uterus: Normal. Not tender.       Adnexa: Right adnexa normal and left adnexa normal.        Right: No mass, tenderness or fullness.          Left: No mass, tenderness or fullness.            Comments: + atrophy  Skin:     General: Skin is dry.   Neurological:      Mental Status: She is  alert. Mental status is at baseline.         Assessment:       1. Lichen sclerosus et atrophicus of the vulva    2. Atrophic vaginitis    3. Chronic vaginitis    4. Malodorous urine    5. Essential hypertension        Plan:         Discussed findings below. Discussed regular use of Premarin and continue to moisturize. Hygiene and reduction of irritants and aggressive washing encouraged. Local area of lichen sclerosus identified to patient and reviewed instructions on use of topicals. Increase water intake and regular voiding.     Lichen sclerosus et atrophicus of the vulva  -     clobetasol 0.05% (TEMOVATE) 0.05 % Oint; Apply 1-2 times daily to top of vaginal lips/vulva area  Dispense: 30 g; Refill: 0    Atrophic vaginitis  -     conjugated estrogens (PREMARIN) vaginal cream; Apply 1 applicator nightly PV x 2 weeks then nightly 3x per week thereafter  Dispense: 30 g; Refill: 5    Chronic vaginitis  -     Vaginosis Screen by DNA Probe    Malodorous urine  -     Urinalysis; Future; Expected date: 03/07/2022  -     Urine culture; Future; Expected date: 03/07/2022    Essential hypertension  Recheck BP before leaving and f/u with PCP    Patient's questions answered. Plan reviewed with patient at the end of visit. Relevant precautions to chief complaint and reasons to seek medical care or contact the office sooner reviewed with patient.     Follow up if symptoms worsen or fail to improve.

## 2022-03-08 LAB
BACTERIAL VAGINOSIS DNA: NEGATIVE
CANDIDA GLABRATA DNA: NEGATIVE
CANDIDA KRUSEI DNA: NEGATIVE
CANDIDA RRNA VAG QL PROBE: NEGATIVE
T VAGINALIS RRNA GENITAL QL PROBE: NEGATIVE

## 2022-03-09 ENCOUNTER — TELEPHONE (OUTPATIENT)
Dept: INTERNAL MEDICINE | Facility: CLINIC | Age: 82
End: 2022-03-09
Payer: MEDICARE

## 2022-03-09 NOTE — TELEPHONE ENCOUNTER
----- Message from Raquel Alexander MD sent at 3/9/2022  1:47 PM CST -----  Please let patient know that no infection was seen in her urine. Continue with creams as written. If no improvement, can schedule follow up.

## 2022-03-09 NOTE — TELEPHONE ENCOUNTER
Attempted to call pt. With urine culture results. Phone message that pt. Not accepting calls at this time.

## 2022-03-10 ENCOUNTER — TELEPHONE (OUTPATIENT)
Dept: INTERNAL MEDICINE | Facility: CLINIC | Age: 82
End: 2022-03-10
Payer: MEDICARE

## 2022-03-10 NOTE — TELEPHONE ENCOUNTER
Pt phone is off ----- Message from Raquel Alexander MD sent at 3/9/2022  1:47 PM CST -----  Please let patient know that no infection was seen in her urine. Continue with creams as written. If no improvement, can schedule follow up.

## 2022-03-17 DIAGNOSIS — M16.11 PRIMARY OSTEOARTHRITIS OF RIGHT HIP: ICD-10-CM

## 2022-03-17 RX ORDER — CELECOXIB 100 MG/1
100 CAPSULE ORAL 2 TIMES DAILY PRN
Qty: 60 CAPSULE | Refills: 3 | OUTPATIENT
Start: 2022-03-17

## 2022-03-18 DIAGNOSIS — M16.11 PRIMARY OSTEOARTHRITIS OF RIGHT HIP: ICD-10-CM

## 2022-03-18 NOTE — TELEPHONE ENCOUNTER
----- Message from Gillian Dooley sent at 3/18/2022  2:46 PM CDT -----  Contact: [phone]  Type:  Needs Medical Advice    Who Called: Pt   Reason for call: regarding a Refill on celecoxib (CELEBREX) 100 MG capsule, pt has been trying to have this Medication refilled for the last few days  Pharmacy name and phone #:  [id number] - [place], [address]  Would the patient rather a call back or a response via [facility]?   Best Call Back Number: [phone]

## 2022-03-20 RX ORDER — CELECOXIB 100 MG/1
100 CAPSULE ORAL 2 TIMES DAILY PRN
Qty: 60 CAPSULE | Refills: 3 | OUTPATIENT
Start: 2022-03-20

## 2022-03-21 DIAGNOSIS — M16.11 PRIMARY OSTEOARTHRITIS OF RIGHT HIP: ICD-10-CM

## 2022-03-21 DIAGNOSIS — N90.4 LICHEN SCLEROSUS ET ATROPHICUS OF THE VULVA: ICD-10-CM

## 2022-03-21 DIAGNOSIS — N95.2 ATROPHIC VAGINITIS: ICD-10-CM

## 2022-03-21 RX ORDER — CELECOXIB 100 MG/1
100 CAPSULE ORAL 2 TIMES DAILY PRN
Qty: 60 CAPSULE | Refills: 3 | Status: SHIPPED | OUTPATIENT
Start: 2022-03-21 | End: 2022-05-16

## 2022-03-21 RX ORDER — CLOBETASOL PROPIONATE 0.5 MG/G
OINTMENT TOPICAL
Qty: 30 G | Refills: 0 | Status: SHIPPED | OUTPATIENT
Start: 2022-03-21 | End: 2022-05-24 | Stop reason: SDUPTHER

## 2022-03-21 NOTE — TELEPHONE ENCOUNTER
----- Message from Kati Carlson sent at 3/21/2022 10:45 AM CDT -----  Contact: [phone]/self  Who Called: PT    Regarding:  speak with dr regarding mediation     Would the patient rather a call back or a response via [facility]? Call back    Best Call Back Number: [phone]    Additional Information: n/a

## 2022-03-21 NOTE — TELEPHONE ENCOUNTER
No new care gaps identified.  Powered by [employer]. Reference number: [id number].   3/21/2022 11:11:39 AM CDT

## 2022-03-21 NOTE — TELEPHONE ENCOUNTER
Re-fill request has been sent ----- Message from Gillian Dooley sent at 3/21/2022  8:58 AM CDT -----  Contact: [phone]  Type:  Needs Medical Advice    Who Called: Pt   Reason for call: regarding a Refill on celecoxib (CELEBREX) 100 MG capsule  Pharmacy name and phone #: [id number], [address]  Would the patient rather a call back or a response via [facility]?  Call back  Best Call Back Number: [phone]

## 2022-03-22 ENCOUNTER — TELEPHONE (OUTPATIENT)
Dept: FAMILY MEDICINE | Facility: CLINIC | Age: 82
End: 2022-03-22
Payer: MEDICARE

## 2022-03-22 NOTE — TELEPHONE ENCOUNTER
----- Message from Raquel Alexander MD sent at 3/22/2022  9:07 AM CDT -----  Please notify patient.   No infection in vaginal swab. Continue with creams as discussed. If no improvement or any issues, return to discuss.

## 2022-03-28 DIAGNOSIS — N95.2 ATROPHIC VAGINITIS: ICD-10-CM

## 2022-03-28 NOTE — TELEPHONE ENCOUNTER
----- Message from Rafael Gale sent at 3/28/2022  8:24 AM CDT -----  Contact: pt.  .Type:  Needs Medical Advice    Who Called: PT  Pharmacy name and phone #: [facility]   Phone: [phone]  Fax:  [phone]  Would the patient rather a call back or a response via [facility]? Call back  Best Call Back Number: [phone]  Additional Information:  Needs a refill on her conjugated estrogens (PREMARIN) vaginal cream 30 g

## 2022-05-02 ENCOUNTER — IMMUNIZATION (OUTPATIENT)
Dept: PHARMACY | Facility: CLINIC | Age: 82
End: 2022-05-02
Payer: MEDICARE

## 2022-05-02 DIAGNOSIS — Z23 NEED FOR VACCINATION: Primary | ICD-10-CM

## 2022-05-16 ENCOUNTER — OFFICE VISIT (OUTPATIENT)
Dept: INTERNAL MEDICINE | Facility: CLINIC | Age: 82
End: 2022-05-16
Payer: MEDICARE

## 2022-05-16 VITALS
BODY MASS INDEX: 29.05 KG/M2 | HEIGHT: 61 IN | DIASTOLIC BLOOD PRESSURE: 70 MMHG | OXYGEN SATURATION: 98 % | HEART RATE: 79 BPM | WEIGHT: 153.88 LBS | SYSTOLIC BLOOD PRESSURE: 126 MMHG

## 2022-05-16 DIAGNOSIS — I10 ESSENTIAL HYPERTENSION: ICD-10-CM

## 2022-05-16 DIAGNOSIS — I10 ESSENTIAL HYPERTENSION: Primary | ICD-10-CM

## 2022-05-16 DIAGNOSIS — M16.11 PRIMARY OSTEOARTHRITIS OF RIGHT HIP: ICD-10-CM

## 2022-05-16 PROCEDURE — 99214 PR OFFICE/OUTPT VISIT, EST, LEVL IV, 30-39 MIN: ICD-10-PCS | Mod: S$GLB,,, | Performed by: INTERNAL MEDICINE

## 2022-05-16 PROCEDURE — 3288F FALL RISK ASSESSMENT DOCD: CPT | Mod: CPTII,S$GLB,, | Performed by: INTERNAL MEDICINE

## 2022-05-16 PROCEDURE — 1160F PR REVIEW ALL MEDS BY PRESCRIBER/CLIN PHARMACIST DOCUMENTED: ICD-10-PCS | Mod: CPTII,S$GLB,, | Performed by: INTERNAL MEDICINE

## 2022-05-16 PROCEDURE — 1159F PR MEDICATION LIST DOCUMENTED IN MEDICAL RECORD: ICD-10-PCS | Mod: CPTII,S$GLB,, | Performed by: INTERNAL MEDICINE

## 2022-05-16 PROCEDURE — 1126F AMNT PAIN NOTED NONE PRSNT: CPT | Mod: CPTII,S$GLB,, | Performed by: INTERNAL MEDICINE

## 2022-05-16 PROCEDURE — 3288F PR FALLS RISK ASSESSMENT DOCUMENTED: ICD-10-PCS | Mod: CPTII,S$GLB,, | Performed by: INTERNAL MEDICINE

## 2022-05-16 PROCEDURE — 1126F PR PAIN SEVERITY QUANTIFIED, NO PAIN PRESENT: ICD-10-PCS | Mod: CPTII,S$GLB,, | Performed by: INTERNAL MEDICINE

## 2022-05-16 PROCEDURE — 99214 OFFICE O/P EST MOD 30 MIN: CPT | Mod: S$GLB,,, | Performed by: INTERNAL MEDICINE

## 2022-05-16 PROCEDURE — 99999 PR PBB SHADOW E&M-EST. PATIENT-LVL IV: CPT | Mod: PBBFAC,,, | Performed by: INTERNAL MEDICINE

## 2022-05-16 PROCEDURE — 99999 PR PBB SHADOW E&M-EST. PATIENT-LVL IV: ICD-10-PCS | Mod: PBBFAC,,, | Performed by: INTERNAL MEDICINE

## 2022-05-16 PROCEDURE — 1101F PR PT FALLS ASSESS DOC 0-1 FALLS W/OUT INJ PAST YR: ICD-10-PCS | Mod: CPTII,S$GLB,, | Performed by: INTERNAL MEDICINE

## 2022-05-16 PROCEDURE — 3074F PR MOST RECENT SYSTOLIC BLOOD PRESSURE < 130 MM HG: ICD-10-PCS | Mod: CPTII,S$GLB,, | Performed by: INTERNAL MEDICINE

## 2022-05-16 PROCEDURE — 3078F DIAST BP <80 MM HG: CPT | Mod: CPTII,S$GLB,, | Performed by: INTERNAL MEDICINE

## 2022-05-16 PROCEDURE — 3078F PR MOST RECENT DIASTOLIC BLOOD PRESSURE < 80 MM HG: ICD-10-PCS | Mod: CPTII,S$GLB,, | Performed by: INTERNAL MEDICINE

## 2022-05-16 PROCEDURE — 3074F SYST BP LT 130 MM HG: CPT | Mod: CPTII,S$GLB,, | Performed by: INTERNAL MEDICINE

## 2022-05-16 PROCEDURE — 1160F RVW MEDS BY RX/DR IN RCRD: CPT | Mod: CPTII,S$GLB,, | Performed by: INTERNAL MEDICINE

## 2022-05-16 PROCEDURE — 1101F PT FALLS ASSESS-DOCD LE1/YR: CPT | Mod: CPTII,S$GLB,, | Performed by: INTERNAL MEDICINE

## 2022-05-16 PROCEDURE — 1159F MED LIST DOCD IN RCRD: CPT | Mod: CPTII,S$GLB,, | Performed by: INTERNAL MEDICINE

## 2022-05-16 RX ORDER — CELECOXIB 100 MG/1
100 CAPSULE ORAL 2 TIMES DAILY PRN
Qty: 60 CAPSULE | Refills: 3 | Status: SHIPPED | OUTPATIENT
Start: 2022-05-16 | End: 2022-07-11

## 2022-05-16 RX ORDER — AMLODIPINE BESYLATE 5 MG/1
5 TABLET ORAL DAILY
Qty: 90 TABLET | Refills: 1 | Status: SHIPPED | OUTPATIENT
Start: 2022-05-16 | End: 2023-02-16

## 2022-05-16 NOTE — PATIENT INSTRUCTIONS
We were happy to see you today    For your Testing  Prior to next visit   Please have your labs and/or imaging test done at your earliest convience      For your Medication   Continue   For more information about side effects please visit medlineplus.gov      For your Referrals  none    For your Vaccinations    We gave your the following vaccines  none  Please obtain the following vaccines at the pharmacy  None         Please return to clinic in      6  month      Extra resources

## 2022-05-16 NOTE — TELEPHONE ENCOUNTER
No new care gaps identified.  [facility] Embedded Care Gaps. Reference number: [id number]. 5/16/2022   8:46:53 AM STEPHANIET

## 2022-05-16 NOTE — PROGRESS NOTES
"Subjective:       Patient ID: Kassandra Rodriguez is a 81 y.o. female.    Chief Complaint:   Hypertension      HPI        Link to History       #Interim Hx    No urgent care or ED/hospitalization    #Concerns Today    none    #Chronic Problems    HTN  Complication: no CVA/CKD/CAD  Last labs :   - BMP  Lab Results   Component Value Date    EGFRNONAA >60.0 08/12/2021       Medication: adherent to   - amlodipnie 5 mg daily  Home Bps; 110-130/60-80s  Symptoms: denies CP, SOB, changes in urination, sudden weakness, numbness      There are no preventive care reminders to display for this patient.    Health Maintenance Topics with due status: Not Due       Topic Last Completion Date    TETANUS VACCINE 04/01/2017    DEXA Scan 08/14/2020    Influenza Vaccine 09/09/2020    Lipid Panel 02/11/2021           Tobacco Use: Medium Risk    Smoking Tobacco Use: Former Smoker    Smokeless Tobacco Use: Never Used       Review of Systems   All other systems reviewed and are negative.      Objective:   /70 (BP Location: Right arm, Patient Position: Sitting, BP Method: Medium (Manual))   Pulse 79   Ht 5' 1" (1.549 m)   Wt 69.8 kg (153 lb 14.1 oz)   LMP  (LMP Unknown)   SpO2 98%   BMI 29.08 kg/m²     Vitals 5/16/2022 3/7/2022 1/4/2022 11/16/2021 8/12/2021   Height 61 61 61 - 61   Weight (lbs) 153.88 152.12 152.23 153 152.12   BMI (kg/m2) 29.1 28.8 28.8 - 28.8            Physical Exam  Vitals and nursing note reviewed.   Constitutional:       General: She is not in acute distress.     Appearance: She is well-developed. She is not diaphoretic.   HENT:      Head: Normocephalic.      Nose: Nose normal.   Eyes:      General:         Right eye: No discharge.         Left eye: No discharge.      Conjunctiva/sclera: Conjunctivae normal.      Pupils: Pupils are equal, round, and reactive to light.   Cardiovascular:      Rate and Rhythm: Normal rate and regular rhythm.      Heart sounds: Normal heart sounds. No murmur heard.  No friction rub. " No gallop.    Pulmonary:      Effort: Pulmonary effort is normal. No respiratory distress.   Abdominal:      General: Bowel sounds are normal. There is no distension.      Palpations: Abdomen is soft.   Musculoskeletal:         General: No deformity. Normal range of motion.      Cervical back: Normal range of motion.   Skin:     General: Skin is warm.   Neurological:      Mental Status: She is alert and oriented to person, place, and time.      Cranial Nerves: No cranial nerve deficit.             ASCVD  The ASCVD Risk score (Simonton BEN Jr., et al., 2013) failed to calculate for the following reasons:    The 2013 ASCVD risk score is only valid for ages 40 to 79    Basic Labs  BMP  Lab Results   Component Value Date     08/12/2021    K 4.1 08/12/2021     08/12/2021    CO2 25 08/12/2021    BUN 12 08/12/2021    CREATININE 0.7 08/12/2021    CALCIUM 9.6 08/12/2021    ANIONGAP 11 08/12/2021    ESTGFRAFRICA >60.0 08/12/2021    EGFRNONAA >60.0 08/12/2021     Lab Results   Component Value Date    ALT 13 05/27/2021    AST 17 05/27/2021    ALKPHOS 75 05/27/2021    BILITOT 1.1 (H) 05/27/2021       Lab Results   Component Value Date    TSH 1.050 02/11/2021       Lab Results   Component Value Date    WBC 11.20 05/27/2021    HGB 12.8 05/27/2021    HCT 38.9 05/27/2021    MCV 95 05/27/2021     05/27/2021           STD  No results found for: HIV1X2, WOL75ZERA  No results found for: RPR  No results found for: HAV, HEPAIGM, HEPBIGM, HEPBCAB, HBEAG, HEPCAB  No results found for: LABNGO, LABCHLA      Lipids  Lab Results   Component Value Date    CHOL 215 (H) 02/11/2021     Lab Results   Component Value Date    HDL 48 02/11/2021     Lab Results   Component Value Date    LDLCALC 153.0 02/11/2021     Lab Results   Component Value Date    TRIG 70 02/11/2021     Lab Results   Component Value Date    CHOLHDL 22.3 02/11/2021         Assessment:       1. Essential hypertension              Plan:                 1. Essential  hypertension  Chronic  Controlled  Patient is at goal today   I have reviewed lifestyle modification to achieve/maintain goals  We will continue the current medication regimen as listed below  Patient will follow up in 6 months       Future Appointments   Date Time Provider Department [place]   11/14/2022  9:00 AM LAB, AMADA [facility] LAB [place]   11/16/2022 11:00 AM [facility] [place] [place]         Medication List with Changes/Refills   Current Medications    AMLODIPINE (NORVASC) 5 MG TABLET    Take 1 tablet by mouth once daily    ASPIRIN (ECOTRIN) 81 MG EC TABLET    Take 81 mg by mouth once daily.    CALCIUM-VITAMIN D 250-100 MG-UNIT PER TABLET    Take 1 tablet by mouth 2 (two) times daily.    CLOBETASOL 0.05% (TEMOVATE) 0.05 % OINT    Apply 1-2 times daily to top of vaginal lips/vulva area    CONJUGATED ESTROGENS (PREMARIN) VAGINAL CREAM    Apply 1 applicator nightly vaginally  x 2 weeks then nightly 3x per week thereafter    FLUTICASONE PROPIONATE (FLONASE) 50 MCG/ACTUATION NASAL SPRAY    1 spray (50 mcg total) by Each Nostril route once daily.    MULTIVIT-MINS NO.63/IRON/FOLIC (M-VIT ORAL)    Take 1 tablet by mouth.   Discontinued Medications    CELECOXIB (CELEBREX) 100 MG CAPSULE    Take 1 capsule (100 mg total) by mouth 2 (two) times daily as needed for Pain.         Disclaimer:  This note has been generated using voice-recognition software. There may be grammatical or spelling errors that have been missed during proof-reading

## 2022-05-24 DIAGNOSIS — N90.4 LICHEN SCLEROSUS ET ATROPHICUS OF THE VULVA: ICD-10-CM

## 2022-05-24 DIAGNOSIS — N95.2 ATROPHIC VAGINITIS: ICD-10-CM

## 2022-05-24 RX ORDER — CLOBETASOL PROPIONATE 0.5 MG/G
OINTMENT TOPICAL
Qty: 30 G | Refills: 0 | Status: SHIPPED | OUTPATIENT
Start: 2022-05-24

## 2022-05-24 NOTE — TELEPHONE ENCOUNTER
----- Message from Reny Contreras, Patient Care Assistant sent at 5/24/2022  8:45 AM CDT -----  Type:  RX Refill Request    Who Called:  pt  Refill or New Rx: refill  RX Name and Strength: clobetasol 0.05% (TEMOVATE) 0.05 % Oint  How is the patient currently taking it? (ex. 1XDay): Apply 1-2 times daily to top of vaginal lips/vulva area  Is this a 30 day or 90 day RX:   Preferred Pharmacy with phone number:  [facility] [address]   Phone: [phone]  Fax:  [phone]  Local or Mail Order: local  Ordering Provider: Gregorio Toure  Would the patient rather a call back or a response via [facility]?  call  Best Call Back Number: [phone]    Additional Information:      Type:  RX Refill Request  Refill or New Rx: refill  RX Name and Strength: conjugated estrogens (PREMARIN) vaginal cream  How is the patient currently taking it? (ex. 1XDay):  Apply 1 applicator nightly vaginally  x 2 weeks then nightly 3x per week thereafter  Is this a 30 day or 90 day RX:

## 2022-05-24 NOTE — TELEPHONE ENCOUNTER
No new care gaps identified.  [facility] Embedded Care Gaps. Reference number: [id number]. 5/24/2022   10:25:33 AM STEPHANIET

## 2022-07-08 ENCOUNTER — TELEPHONE (OUTPATIENT)
Dept: INTERNAL MEDICINE | Facility: CLINIC | Age: 82
End: 2022-07-08
Payer: MEDICARE

## 2022-07-08 NOTE — TELEPHONE ENCOUNTER
----- Message from Sara Sauer sent at 7/8/2022  8:43 AM CDT -----  Contact: [phone] or [phone]/ Self  Type: Requesting to speak with nurse    Who Called: Pt   Regarding:has concerns on recent fall , pain meds not working    Would the patient rather a call back or a response via [facility]? Call back  Best Call Back Number: [phone] or [phone]  Additional Information: Pt scheduled for November for annual but would like to be seen sooner

## 2022-07-11 ENCOUNTER — OFFICE VISIT (OUTPATIENT)
Dept: INTERNAL MEDICINE | Facility: CLINIC | Age: 82
End: 2022-07-11
Payer: MEDICARE

## 2022-07-11 DIAGNOSIS — M54.16 LUMBAR RADICULOPATHY: ICD-10-CM

## 2022-07-11 DIAGNOSIS — Z86.69 HISTORY OF GUILLAIN-BARRE SYNDROME: ICD-10-CM

## 2022-07-11 DIAGNOSIS — I10 ESSENTIAL HYPERTENSION: Primary | ICD-10-CM

## 2022-07-11 DIAGNOSIS — N39.3 STRESS INCONTINENCE: ICD-10-CM

## 2022-07-11 DIAGNOSIS — Z12.31 ENCOUNTER FOR SCREENING MAMMOGRAM FOR BREAST CANCER: ICD-10-CM

## 2022-07-11 PROCEDURE — 99999 PR PBB SHADOW E&M-EST. PATIENT-LVL III: ICD-10-PCS | Mod: PBBFAC,,, | Performed by: INTERNAL MEDICINE

## 2022-07-11 PROCEDURE — 99214 OFFICE O/P EST MOD 30 MIN: CPT | Mod: S$GLB,,, | Performed by: INTERNAL MEDICINE

## 2022-07-11 PROCEDURE — 99214 PR OFFICE/OUTPT VISIT, EST, LEVL IV, 30-39 MIN: ICD-10-PCS | Mod: S$GLB,,, | Performed by: INTERNAL MEDICINE

## 2022-07-11 PROCEDURE — 99999 PR PBB SHADOW E&M-EST. PATIENT-LVL III: CPT | Mod: PBBFAC,,, | Performed by: INTERNAL MEDICINE

## 2022-07-11 PROCEDURE — 1159F MED LIST DOCD IN RCRD: CPT | Mod: CPTII,S$GLB,, | Performed by: INTERNAL MEDICINE

## 2022-07-11 PROCEDURE — 1160F RVW MEDS BY RX/DR IN RCRD: CPT | Mod: CPTII,S$GLB,, | Performed by: INTERNAL MEDICINE

## 2022-07-11 PROCEDURE — 1160F PR REVIEW ALL MEDS BY PRESCRIBER/CLIN PHARMACIST DOCUMENTED: ICD-10-PCS | Mod: CPTII,S$GLB,, | Performed by: INTERNAL MEDICINE

## 2022-07-11 PROCEDURE — 1159F PR MEDICATION LIST DOCUMENTED IN MEDICAL RECORD: ICD-10-PCS | Mod: CPTII,S$GLB,, | Performed by: INTERNAL MEDICINE

## 2022-07-11 NOTE — PROGRESS NOTES
Assessment:       1. Essential hypertension    2. History of Guillain-Smithfield syndrome    3. Lumbar radiculopathy    4. Encounter for screening mammogram for breast cancer    5. Stress incontinence        Plan:         Diagnoses and all orders for this visit:    Essential hypertension  Chronic  Controlled  Patient is at goal today   I have reviewed lifestyle modification to achieve/maintain goals  We will continue the current medication regimen as listed below  As schedule for follow up     History of Guillain-Smithfield syndrome    Lumbar radiculopathy  Chronic  Uncontrolled  Patient is not at goal today  I have reviewed lifestyle modification to achieve/maintain goals  We will continue the current medication regimen as listed below  Patient will follow up in 2 weeks with PT/OT  -     Ambulatory referral/consult to Physical/Occupational Therapy; Future    Encounter for screening mammogram for breast cancer  -     Mammo Digital Screening Bilat; Future    Stress incontinence  Chronic  Uncontrolled  Patient is not at goal today  I have reviewed lifestyle modification to achieve/maintain goals  We will continue the current medication regimen as listed below  Patient will follow up in 2 weeks with urogyn   -     Ambulatory referral/consult to Urogynecology; Future          Subjective:       Patient ID: Kassandra Rodriguez is a 81 y.o. female.    Chief Complaint: No chief complaint on file.    PATIENT HAS MULTIPLE COMPLAINTS THE.  She states that she has had issues with urinary leakage with the reported 1 year.  She is had 6 children.  She states that with coughing or walking going from sitting to standing she will have leakage of urine.  She denies any urgency.  She has not had any dysuria or hematuria.  Additionally she reports chronic pain of the right lumbar region radiating down to the knee.  She states that she has no weakness or numbness but that the Celebrex she was taking chronically for 3-4 years and recently stopped.  He  she has tried Tylenol but not helping with symptoms.  She has not done physical therapy recently nor has had any recent imaging test.    HPI  Review of Systems   All other systems reviewed and are negative.        Objective:     LMP  (LMP Unknown)       Wt Readings from Last 1 Encounters:   05/16/22 0847 69.8 kg (153 lb 14.1 oz)       BMI Readings from Last 1 Encounters:   05/16/22 29.08 kg/m²            Physical Exam  Vitals and nursing note reviewed.   Constitutional:       General: She is not in acute distress.     Appearance: She is well-developed. She is not diaphoretic.   HENT:      Head: Normocephalic.      Nose: Nose normal.   Eyes:      General:         Right eye: No discharge.         Left eye: No discharge.      Conjunctiva/sclera: Conjunctivae normal.      Pupils: Pupils are equal, round, and reactive to light.   Cardiovascular:      Rate and Rhythm: Normal rate and regular rhythm.      Heart sounds: Normal heart sounds. No murmur heard.    No friction rub. No gallop.   Pulmonary:      Effort: Pulmonary effort is normal. No respiratory distress.   Abdominal:      General: Bowel sounds are normal. There is no distension.      Palpations: Abdomen is soft.   Musculoskeletal:         General: No deformity. Normal range of motion.      Cervical back: Normal range of motion.      Comments: No internal/exteranl rotation pain, normal ROM, neg markie,. Negative straight leg    Skin:     General: Skin is warm.   Neurological:      Mental Status: She is alert and oriented to person, place, and time.      Cranial Nerves: No cranial nerve deficit.             Future Appointments   Date Time Provider Department Center   7/14/2022  7:00 AM uLis Anguiano, PT [facility]   7/22/2022  1:40 PM Cindy Corral NP [facility] UROLOG [place] [facility]   8/1/2022 11:00 AM KN MAMMO2 [facility] MAMMO [facility]   11/14/2022  9:00 AM LAB, AMADA KENH LAB [place]   11/16/2022 11:00 AM Mesfin Perkins III, MD [facility]          Medication List with Changes/Refills   Current Medications    AMLODIPINE (NORVASC) 5 MG TABLET    Take 1 tablet (5 mg total) by mouth once daily.    ASPIRIN (ECOTRIN) 81 MG EC TABLET    Take 81 mg by mouth once daily.    CALCIUM-VITAMIN D 250-100 MG-UNIT PER TABLET    Take 1 tablet by mouth 2 (two) times daily.    CLOBETASOL 0.05% (TEMOVATE) 0.05 % OINT    Apply 1-2 times daily to top of vaginal lips/vulva area    CONJUGATED ESTROGENS (PREMARIN) VAGINAL CREAM    Apply 1 applicator nightly vaginally  x 2 weeks then nightly 3x per week thereafter    FLUTICASONE PROPIONATE (FLONASE) 50 MCG/ACTUATION NASAL SPRAY    1 spray (50 mcg total) by Each Nostril route once daily.    MULTIVIT-MINS NO.63/IRON/FOLIC (M-VIT ORAL)    Take 1 tablet by mouth.   Discontinued Medications    CELECOXIB (CELEBREX) 100 MG CAPSULE    Take 1 capsule (100 mg total) by mouth 2 (two) times daily as needed for Pain.         Disclaimer:  This note has been generated using voice-recognition software. There may be grammatical or spelling errors that have been missed during proof-reading

## 2022-07-14 ENCOUNTER — CLINICAL SUPPORT (OUTPATIENT)
Dept: REHABILITATION | Facility: HOSPITAL | Age: 82
End: 2022-07-14
Attending: INTERNAL MEDICINE
Payer: MEDICARE

## 2022-07-14 DIAGNOSIS — M54.41 CHRONIC RIGHT-SIDED LOW BACK PAIN WITH RIGHT-SIDED SCIATICA: ICD-10-CM

## 2022-07-14 DIAGNOSIS — G89.29 CHRONIC RIGHT-SIDED LOW BACK PAIN WITH RIGHT-SIDED SCIATICA: ICD-10-CM

## 2022-07-14 DIAGNOSIS — M54.16 LUMBAR RADICULOPATHY: ICD-10-CM

## 2022-07-14 PROCEDURE — 97162 PT EVAL MOD COMPLEX 30 MIN: CPT | Mod: PN

## 2022-07-14 PROCEDURE — 97110 THERAPEUTIC EXERCISES: CPT | Mod: PN

## 2022-07-14 NOTE — PLAN OF CARE
"[facility]  Physical Therapy Initial Evaluation    Date: 7/14/2022   Name: Kassandra Rodriguez  Clinic Number: [id number]    Therapy Diagnosis:   Encounter Diagnoses   Name Primary?    Lumbar radiculopathy     Chronic right-sided low back pain with right-sided sciatica      Physician: Mesfin Perkins III, MD    Physician Orders: PT Eval and Treat   Medical Diagnosis from Referral: M54.16 (ICD-10-CM) - Lumbar radiculopathy  Evaluation Date: 7/14/2022  Authorization Period Expiration: 8/11/2022  Plan of Care Expiration: 8/11/2022  Visit # / Visits authorized: 1/12    Time In: 8:15 am  Time Out: 9:00 am  Total Appointment Time (timed & untimed codes): 45 minutes (1 MCE) (1 TE)    Precautions: Standard    Subjective   Date of onset: "years and years"   History of current condition - Kassandra reports: she has been having low back pain for many years and she was on celebrex, which helped until her doctor took her off due to kidney issues. She reports having right sided low back pain into her buttock. Primary aggravating factors include extended sitting. Reports pain will typically radiate down (R) lower extremity into her buttock and not past her knee. Aggravating factors include extended sitting, bending over, and increased activity/chores. Easing factors include laying down/resting. Patient denies any saddle paresthesia, recent balance deficits, or bowel/bladder incontinence.     Recent falls: May 20th, 2022 - she was outside and was getting down from a step     Imaging: N/A     Medical History:   Past Medical History:   Diagnosis Date    Hearing loss     Hypertension        Surgical History:   Kassandra Rodriguez  has a past surgical history that includes Laparoscopic cholecystectomy.    Medications:   Kassandra BOBO has a current medication list which includes the following prescription(s): amlodipine, aspirin, calcium-vitamin d, clobetasol 0.05%, conjugated estrogens, fluticasone propionate, and multivit-mins " "no.63/iron/folic.    Allergies:   Review of patient's allergies indicates:   Allergen Reactions    Penicillins Other (See Comments)        Prior Therapy: no   Social History: lives alone  Occupation: retired   Prior Level of Function: 100% on celebrex  Current Level of Function: pushing through pain, not limiting her pain     Pain:  Current: 6/10; Worst: 6/10; Best: 2/10   Location: low back  Description: aching, dull, tight, sharp, denies/reports numbness/tingling   Aggravating Factors: standing, walking, bending over, prolonged positions   Easing Factors: laying on her back and left side     Pt's goals:   1. Patient would like to learn how to manage low back pain and increase activity level pain free.   Objective   Sitting Posture:     Slumped sitting posture     DTR:   Right Left   Patellar (L3-4) 2+ 2+   Achilles (S1) 2+ 2+       Neuro Dynamic Testing:    Sciatic nerve:      SLR:   R = (+)     L = (-)    Lumbar Range of Motion:    Degrees Pain   Flexion  60 degrees    Low back pain; right buttock pain   Silvia's sign ( + )   Extension  10 degrees    "Feels good"   Left Side Bending  Trunk sway - limited  No pain   Right Side Bending  Trunk sway - limited  Right sided low back pain   Left rotation    Limited  Tightness   Right Rotation    Limited  Tightness        Hip Range of Motion:   Left Passive Right Passive   External Rotation  25 degrees  25 degrees   Internal Rotation  30 degrees  30 degrees   Flexion  105 degrees  105 degrees       Lower Extremity Strength  Right LE  Left LE    Knee extension: 5/5 Knee extension: 5/5   Knee flexion: 5/5 Knee flexion: 5/5   Hip flexion: 4+/5 Hip flexion: 4+/5   Hip extension:  4/5 Hip extension: 4/5   Hip abduction: 4/5 Hip abduction: 4+/5        Joint Mobility: NT    Palpation:   TTP+ = tender to palpation   TTT++ = very tender to touch  Right gluteus tender to palpation+     Sensation: WNL    Flexibility: WNL - right hip tightness      Limitation/Restriction for FOTO " "Lumbar Survey    Therapist reviewed FOTO scores for Kassandra Rodriguez on 7/14/2022.   FOTO documents entered into EPIC - see Media section.    Limitation Score: 49%  Predicted Limitation Score: 32%         TREATMENT   Treatment Time In: 7:45 am  Treatment Time Out: 8:00 am  Total Treatment time (time-based codes) separate from Evaluation: 15 minutes    Kassandra received therapeutic exercises to develop strength, endurance, ROM, flexibility, posture, and core stabilization for 15 minutes including:  LTR: 20x  SKTC: 10"x10  Piriformis stretch: 3x30"  Bridges with posterior pelvic tilt: 15x    Home Exercises and Patient Education Provided    Education provided:   - HEP  - Prognosis/POC  - Pain science    Written Home Exercises Provided: yes.  Exercises were reviewed and Kassandra was able to demonstrate them prior to the end of the session.  Kassandra demonstrated good  understanding of the education provided.     See EMR under Media for exercises provided 7/14/2022.    Assessment   Kassandra BOBO is a 81 y.o. female referred to outpatient Physical Therapy with a medical diagnosis of lumbar radiculopathy. Patient has signs and symptoms presenting as low back pain due to core instability and right deep gluteal tendinopathy. Patient also presents with core instability and gluteus medius/dipika weakness, tightness of hip flexors, lumbar erectors, and piriformis muscles, and functional limitations of prolonged positioning and dynamic activities. Patient would benefit from skilled PT focusing on regaining pain-free active lumbar range of motion along with core stabilization training.     Pt to be seen 2x/week for 8 weeks     Pt prognosis is Good.   Pt will benefit from skilled outpatient Physical Therapy to address the deficits stated above and in the chart below, provide pt/family education, and to maximize pt's level of independence.     Plan of care discussed with patient: Yes  Pt's spiritual, cultural and educational needs considered and patient " is agreeable to the plan of care and goals as stated below:     Anticipated Barriers for therapy: age    Medical Necessity is demonstrated by the following  History  Co-morbidities and personal factors that may impact the plan of care Co-morbidities:   advanced age, high BMI and HTN    Personal Factors:   age  lifestyle     high   Examination  Body Structures and Functions, activity limitations and participation restrictions that may impact the plan of care Body Regions:   back  lower extremities  trunk    Body Systems:    gross symmetry  ROM  strength  gross coordinated movement  balance  gait  transfers  transitions  motor control  motor learning    Participation Restrictions:   Community ambulation   Shopping   Cleaning   Chores    Activity limitations:   Learning and applying knowledge  no deficits    General Tasks and Commands  no deficits    Communication  no deficits    Mobility  lifting and carrying objects  walking    Self care  no deficits    Domestic Life  shopping  doing house work (cleaning house, washing dishes, laundry)  assisting others    Interactions/Relationships  no deficits    Life Areas  no deficits    Community and Social Life  no deficits         high   Clinical Presentation evolving clinical presentation with changing clinical characteristics moderate   Decision Making/ Complexity Score: moderate     Goals:  Short Term Goals: 4 weeks  1. Patient will be independent with HEP in order to supplement pain free lumbar ROM - PROGRESSING, NOT MET  2. Pt will improve hip flexibility to WNL to promote functional mobility - PROGRESSING, NOT MET  3. Patient will display the ability to perform an isometric transverse abdominis isometric contraction in supine for improved trunk stability - PROGRESSING, NOT MET      Long Term Goals: 8 weeks   1. Pt will improve lumbar FOTO survey to </=32% limited in order to return to ADLs without limitation - PROGRESSING, NOT MET  2. Patient will improve hip flexion,  abduction, and extension strength to a 5/5 bilaterally for improved trunk support. - PROGRESSING, NOT MET  3. Pt will report no pain during lumbar AROM in order to promote functional mobility - PROGRESSING, NOT MET  4. Patient will display the ability and understanding of performing a proper hip hinge with quality movement coordination for lifting mechanics. - PROGRESSING, NOT MET     Plan   Plan of care Certification: 7/14/2022 to 8/11/2022    Outpatient Physical Therapy 2 times weekly for 8 weeks to include the following interventions: Cervical/Lumbar Traction, Gait Training, Manual Therapy, Moist Heat/ Ice, Neuromuscular Re-ed, Patient Education, Self Care, Therapeutic Activities, and Therapeutic Exercise.     Luis Anguiano, PT

## 2022-07-19 ENCOUNTER — TELEPHONE (OUTPATIENT)
Dept: INTERNAL MEDICINE | Facility: CLINIC | Age: 82
End: 2022-07-19
Payer: MEDICARE

## 2022-07-19 ENCOUNTER — CLINICAL SUPPORT (OUTPATIENT)
Dept: REHABILITATION | Facility: HOSPITAL | Age: 82
End: 2022-07-19
Payer: MEDICARE

## 2022-07-19 DIAGNOSIS — M54.16 LUMBAR RADICULOPATHY: Primary | ICD-10-CM

## 2022-07-19 PROBLEM — M54.50 LOW BACK PAIN: Status: ACTIVE | Noted: 2022-07-19

## 2022-07-19 PROCEDURE — 97110 THERAPEUTIC EXERCISES: CPT | Mod: PN,CQ

## 2022-07-19 NOTE — TELEPHONE ENCOUNTER
----- Message from Thu Chance sent at 7/19/2022  9:04 AM CDT -----  Regarding: call back  Contact: [phone]  Who Called: PT     Type:  Needs Medical Advice    Who Called: PT   Symptoms (please be specific): UTI or probably a yeast infection and odor   How long has patient had these symptoms:  yesterday   Would the patient rather a call back or a response via [facility]? Call back   Best Call Back Number: [phone]  Additional Information: patient would like to get orders for a urine specimen

## 2022-07-19 NOTE — TELEPHONE ENCOUNTER
----- Message from Thu Chance sent at 7/19/2022  9:04 AM CDT -----  Regarding: call back  Contact: [phone]  Who Called: PT     Type:  Needs Medical Advice    Who Called: PT   Symptoms (please be specific): UTI or probably a yeast infection and odor   How long has patient had these symptoms:  yesterday   Would the patient rather a call back or a response via [facility]? Call back   Best Call Back Number: [phone]  Additional Information: patient would like to get orders for a urine specimen

## 2022-07-21 ENCOUNTER — CLINICAL SUPPORT (OUTPATIENT)
Dept: REHABILITATION | Facility: HOSPITAL | Age: 82
End: 2022-07-21
Payer: MEDICARE

## 2022-07-21 DIAGNOSIS — M54.16 LUMBAR RADICULOPATHY: Primary | ICD-10-CM

## 2022-07-21 PROCEDURE — 97110 THERAPEUTIC EXERCISES: CPT | Mod: PN,CQ

## 2022-07-21 NOTE — PROGRESS NOTES
"[facility]   Physical Therapy Treatment Note     Name: Kassandra Rodriguez  Clinic Number: [id number]    Therapy Diagnosis:   Encounter Diagnosis   Name Primary?    Lumbar radiculopathy Yes     Physician: Mesfin Perkins III, MD    Visit Date: 7/21/2022    Encounter Diagnoses   Name Primary?    Lumbar radiculopathy      Chronic right-sided low back pain with right-sided sciatica        Physician: Mesfin Perkins III, MD     Physician Orders: PT Eval and Treat   Medical Diagnosis from Referral: M54.16 (ICD-10-CM) - Lumbar radiculopathy  Evaluation Date: 7/14/2022  Authorization Period Expiration: 8/11/2022  Plan of Care Expiration: 8/11/2022  Visit # / Visits authorized: 3/12      PTA Visit #: 2/5     Time In: 720 am  Time Out: 800 am  Total Billable Time: 40 minutes (3TE)    SUBJECTIVE     Pt reports: continues to report improvement in overall symptoms although was moderately sore following last treatment session.   She was compliant with home exercise program.  Response to previous treatment: no adverse effects   Functional change: less pain     Pain: 0/10  Location: right back      OBJECTIVE     Objective Measures updated at progress report unless specified.     Treatment     Kassandra received the treatments listed below:      therapeutic exercises to develop strength, endurance, ROM, flexibility, posture and core stabilization for 40 minutes including:   LTR: 20x  SKTC: bilateral 3x30" each   Piriformis stretch: 3x30"  Bridges with posterior pelvic tilt: 20x with 5" holds with green theraband around knees  Clamshell 2 x 10  Hamstring stretches 3x30" bilateral with strap  Prone on rolled towel under forehead 15x5" holds gluteal squeezes  Prone static on forearms with 15 breadths with hips flat  Prone press ups on forearms 20x with chin tucks and  with hips flat  Prone press ups  extended elbows to hands 2x10  Walking laps 3 x 1 trial  Hook lying brace marching 2 trials of 1 minute        manual " therapy techniques: Joint mobilizations, Manual traction, Myofacial release, Soft tissue Mobilization and Friction Massage were applied to the: lumbar spine  for 0 minutes, including:  Not performed       Patient Education and Home Exercises     Home Exercises Provided and Patient Education Provided     Education provided:   - home exercise program     Written Home Exercises Provided: Patient instructed to cont prior HEP. Exercises were reviewed and Kassandra was able to demonstrate them prior to the end of the session.  Kassandra demonstrated good  understanding of the education provided. See EMR under Patient Instructions for exercises provided during therapy sessions    ASSESSMENT     Kassandra presents with no current pain. Reports that following last session she was moderately sore for about 2 days. Minimal change in exercise program due to response from last session. Continued with her directional preference of extension with good tolerance and no exacerbation of symptoms.  Her pain did not return and exited clinic pain free. Continue to progress as appropriate.    Kassandra Is progressing well towards her goals.   Pt prognosis is Good.     Pt will continue to benefit from skilled outpatient physical therapy to address the deficits listed in the problem list box on initial evaluation, provide pt/family education and to maximize pt's level of independence in the home and community environment.     Pt's spiritual, cultural and educational needs considered and pt agreeable to plan of care and goals.     Anticipated barriers to physical therapy: age    Goals:   Short Term Goals: 4 weeks  1. Patient will be independent with HEP in order to supplement pain free lumbar ROM - PROGRESSING, NOT MET  2. Pt will improve hip flexibility to WNL to promote functional mobility - PROGRESSING, NOT MET  3. Patient will display the ability to perform an isometric transverse abdominis isometric contraction in supine for improved trunk stability -  PROGRESSING, NOT MET      Long Term Goals: 8 weeks   1. Pt will improve lumbar FOTO survey to </=32% limited in order to return to ADLs without limitation - PROGRESSING, NOT MET  2. Patient will improve hip flexion, abduction, and extension strength to a 5/5 bilaterally for improved trunk support. - PROGRESSING, NOT MET  3. Pt will report no pain during lumbar AROM in order to promote functional mobility - PROGRESSING, NOT MET  4. Patient will display the ability and understanding of performing a proper hip hinge with quality movement coordination for lifting mechanics. - PROGRESSING, NOT MET      PLAN     Continue PT plan of care     Angie Cuevas, PTA

## 2022-07-22 ENCOUNTER — OFFICE VISIT (OUTPATIENT)
Dept: UROLOGY | Facility: CLINIC | Age: 82
End: 2022-07-22
Payer: MEDICARE

## 2022-07-22 VITALS
HEIGHT: 61 IN | BODY MASS INDEX: 27.94 KG/M2 | HEART RATE: 94 BPM | WEIGHT: 148 LBS | DIASTOLIC BLOOD PRESSURE: 71 MMHG | SYSTOLIC BLOOD PRESSURE: 116 MMHG

## 2022-07-22 DIAGNOSIS — N95.2 VAGINAL ATROPHY: Primary | ICD-10-CM

## 2022-07-22 DIAGNOSIS — N39.3 STRESS INCONTINENCE: ICD-10-CM

## 2022-07-22 DIAGNOSIS — R82.90 MALODOROUS URINE: ICD-10-CM

## 2022-07-22 LAB
BILIRUB SERPL-MCNC: NORMAL MG/DL
BLOOD URINE, POC: NORMAL
CLARITY, POC UA: CLEAR
COLOR, POC UA: YELLOW
GLUCOSE UR QL STRIP: NORMAL
KETONES UR QL STRIP: NORMAL
LEUKOCYTE ESTERASE URINE, POC: NORMAL
NITRITE, POC UA: NORMAL
PH, POC UA: 5
PROTEIN, POC: NORMAL
SPECIFIC GRAVITY, POC UA: 1.02
UROBILINOGEN, POC UA: NORMAL

## 2022-07-22 PROCEDURE — 3074F PR MOST RECENT SYSTOLIC BLOOD PRESSURE < 130 MM HG: ICD-10-PCS | Mod: CPTII,S$GLB,, | Performed by: NURSE PRACTITIONER

## 2022-07-22 PROCEDURE — 81002 POCT URINE DIPSTICK WITHOUT MICROSCOPE: ICD-10-PCS | Mod: S$GLB,,, | Performed by: NURSE PRACTITIONER

## 2022-07-22 PROCEDURE — 3288F PR FALLS RISK ASSESSMENT DOCUMENTED: ICD-10-PCS | Mod: CPTII,S$GLB,, | Performed by: NURSE PRACTITIONER

## 2022-07-22 PROCEDURE — 3078F DIAST BP <80 MM HG: CPT | Mod: CPTII,S$GLB,, | Performed by: NURSE PRACTITIONER

## 2022-07-22 PROCEDURE — 1159F MED LIST DOCD IN RCRD: CPT | Mod: CPTII,S$GLB,, | Performed by: NURSE PRACTITIONER

## 2022-07-22 PROCEDURE — 1126F AMNT PAIN NOTED NONE PRSNT: CPT | Mod: CPTII,S$GLB,, | Performed by: NURSE PRACTITIONER

## 2022-07-22 PROCEDURE — 87481 CANDIDA DNA AMP PROBE: CPT | Mod: 59 | Performed by: NURSE PRACTITIONER

## 2022-07-22 PROCEDURE — 87801 DETECT AGNT MULT DNA AMPLI: CPT | Performed by: NURSE PRACTITIONER

## 2022-07-22 PROCEDURE — 1160F RVW MEDS BY RX/DR IN RCRD: CPT | Mod: CPTII,S$GLB,, | Performed by: NURSE PRACTITIONER

## 2022-07-22 PROCEDURE — 81002 URINALYSIS NONAUTO W/O SCOPE: CPT | Mod: S$GLB,,, | Performed by: NURSE PRACTITIONER

## 2022-07-22 PROCEDURE — 99204 PR OFFICE/OUTPT VISIT, NEW, LEVL IV, 45-59 MIN: ICD-10-PCS | Mod: S$GLB,,, | Performed by: NURSE PRACTITIONER

## 2022-07-22 PROCEDURE — 3288F FALL RISK ASSESSMENT DOCD: CPT | Mod: CPTII,S$GLB,, | Performed by: NURSE PRACTITIONER

## 2022-07-22 PROCEDURE — 3078F PR MOST RECENT DIASTOLIC BLOOD PRESSURE < 80 MM HG: ICD-10-PCS | Mod: CPTII,S$GLB,, | Performed by: NURSE PRACTITIONER

## 2022-07-22 PROCEDURE — 99204 OFFICE O/P NEW MOD 45 MIN: CPT | Mod: S$GLB,,, | Performed by: NURSE PRACTITIONER

## 2022-07-22 PROCEDURE — 1101F PR PT FALLS ASSESS DOC 0-1 FALLS W/OUT INJ PAST YR: ICD-10-PCS | Mod: CPTII,S$GLB,, | Performed by: NURSE PRACTITIONER

## 2022-07-22 PROCEDURE — 1160F PR REVIEW ALL MEDS BY PRESCRIBER/CLIN PHARMACIST DOCUMENTED: ICD-10-PCS | Mod: CPTII,S$GLB,, | Performed by: NURSE PRACTITIONER

## 2022-07-22 PROCEDURE — 99999 PR PBB SHADOW E&M-EST. PATIENT-LVL IV: CPT | Mod: PBBFAC,,, | Performed by: NURSE PRACTITIONER

## 2022-07-22 PROCEDURE — 99999 PR PBB SHADOW E&M-EST. PATIENT-LVL IV: ICD-10-PCS | Mod: PBBFAC,,, | Performed by: NURSE PRACTITIONER

## 2022-07-22 PROCEDURE — 1126F PR PAIN SEVERITY QUANTIFIED, NO PAIN PRESENT: ICD-10-PCS | Mod: CPTII,S$GLB,, | Performed by: NURSE PRACTITIONER

## 2022-07-22 PROCEDURE — 1101F PT FALLS ASSESS-DOCD LE1/YR: CPT | Mod: CPTII,S$GLB,, | Performed by: NURSE PRACTITIONER

## 2022-07-22 PROCEDURE — 1159F PR MEDICATION LIST DOCUMENTED IN MEDICAL RECORD: ICD-10-PCS | Mod: CPTII,S$GLB,, | Performed by: NURSE PRACTITIONER

## 2022-07-22 PROCEDURE — 3074F SYST BP LT 130 MM HG: CPT | Mod: CPTII,S$GLB,, | Performed by: NURSE PRACTITIONER

## 2022-07-22 RX ORDER — MIRABEGRON 25 MG/1
25 TABLET, FILM COATED, EXTENDED RELEASE ORAL DAILY
Qty: 30 TABLET | Refills: 11 | Status: SHIPPED | OUTPATIENT
Start: 2022-07-22 | End: 2023-11-01 | Stop reason: SDUPTHER

## 2022-07-22 NOTE — PROGRESS NOTES
CHIEF COMPLAINT:    Mrs. Rodriguez is a 81 y.o. female presenting for stress incontinence.    .  PRESENTING ILLNESS:    Kassandra Rodriguez is a 81 y.o. female with a PMH of hx guillain-barre, htn, atrophic vaginitis who presents for stress incontinence.     New patient to urology department. Presents today as referral secondary reported stress incontinence.  She states that she has had issues with urinary leakage for about a year.  She has had 6 children.  She states that with coughing or walking going from sitting to standing she will have leakage of urine.  also reports having malodorous urine, which she is concerned about possible infection.  She denies any urgency.  She has not had any dysuria, fever, or hematuria.  Currently wearing pad with towel and 2 pair of underwear to manage leakage.    Will start myrbetriq for stress incontinence.  Will also refer to pelvic floor therapy. Plan to reassess symptoms in a couple of months.  If no improvement will refer to Dr. Almaguer for further treatment. (patient prefers female doctors)    REVIEW OF SYSTEMS:    Review of Systems   Constitutional: Negative for chills and fever.   Respiratory: Negative for shortness of breath.    Cardiovascular: Negative for chest pain.   Gastrointestinal: Negative for constipation and diarrhea.   Genitourinary: Negative for dysuria, flank pain, frequency, hematuria and urgency.   Neurological: Negative for dizziness and weakness.        PATIENT HISTORY:    Past Medical History:   Diagnosis Date    Hearing loss     Hypertension        History reviewed. No pertinent family history.    Allergies:  Penicillins    Medications:    Current Outpatient Medications:     amLODIPine (NORVASC) 5 MG tablet, Take 1 tablet (5 mg total) by mouth once daily., Disp: 90 tablet, Rfl: 1    aspirin (ECOTRIN) 81 MG EC tablet, Take 81 mg by mouth once daily., Disp: , Rfl:     calcium-vitamin D 250-100 mg-unit per tablet, Take 1 tablet by mouth 2 (two) times daily.,  Disp: , Rfl:     clobetasol 0.05% (TEMOVATE) 0.05 % Oint, Apply 1-2 times daily to top of vaginal lips/vulva area, Disp: 30 g, Rfl: 0    conjugated estrogens (PREMARIN) vaginal cream, Apply 1 applicator nightly vaginally  x 2 weeks then nightly 3x per week thereafter, Disp: 30 g, Rfl: 5    fluticasone propionate (FLONASE) 50 mcg/actuation nasal spray, 1 spray (50 mcg total) by Each Nostril route once daily., Disp: 15.8 mL, Rfl: 0    multivit-mins no.63/iron/folic (M-VIT ORAL), Take 1 tablet by mouth., Disp: , Rfl:     mirabegron (MYRBETRIQ) 25 mg Tb24 ER tablet, Take 1 tablet (25 mg total) by mouth once daily., Disp: 30 tablet, Rfl: 11    PHYSICAL EXAMINATION:    Physical Exam  Vitals and nursing note reviewed. Exam conducted with a chaperone present.   Constitutional:       Appearance: Normal appearance. She is well-developed.   HENT:      Head: Normocephalic and atraumatic.   Eyes:      Pupils: Pupils are equal, round, and reactive to light.   Pulmonary:      Effort: Pulmonary effort is normal.   Genitourinary:     General: Normal vulva.      Exam position: Supine.      Urethra: No prolapse, urethral pain, urethral swelling or urethral lesion.      Vagina: Prolapsed vaginal walls present. No vaginal discharge or tenderness.      Rectum: Normal.      Comments: Vaginal atrophy/dryness noted  Musculoskeletal:         General: Normal range of motion.      Cervical back: Normal range of motion.   Skin:     General: Skin is warm and dry.   Neurological:      Mental Status: She is alert and oriented to person, place, and time.   Psychiatric:         Behavior: Behavior normal.           LABS:    U/a dipstick performed in office today: yellow, ph 5, 1.020, otherwise unremarkable    IMPRESSION:    Encounter Diagnoses   Name Primary?    Stress incontinence        PLAN:    Problem List Items Addressed This Visit    None     Visit Diagnoses     Stress incontinence        Relevant Medications    mirabegron (MYRBETRIQ) 25  mg Tb24 ER tablet    Other Relevant Orders    VAGINOSIS SCREEN BY DNA PROBE    Ambulatory referral/consult to Physical/Occupational Therapy    POCT URINE DIPSTICK WITHOUT MICROSCOPE (Completed)          1. Stress incontinence   - begin myrbetriq   - referral to pelvic floor therapy  2. Malodorous urine   Send vaginosis swab   Urine dip not indicative of infection  3. Vaginal atrophy/dryness   Prescribed premarin cream - patient tried cream and does not believe it helps, would prefer not to use cream  4. rtc in 3 mths to assess symptoms    Cindy Corral NP

## 2022-07-25 ENCOUNTER — TELEPHONE (OUTPATIENT)
Dept: INTERNAL MEDICINE | Facility: CLINIC | Age: 82
End: 2022-07-25
Payer: MEDICARE

## 2022-07-25 NOTE — TELEPHONE ENCOUNTER
----- Message from Yue Stanley MA sent at 7/25/2022  8:45 AM CDT -----  Contact: [phone]  Patient states she is unable financially to do therapy sessions. Patient will get medication NP Voltz prescribed. Please advise patient.

## 2022-07-27 ENCOUNTER — CLINICAL SUPPORT (OUTPATIENT)
Dept: REHABILITATION | Facility: HOSPITAL | Age: 82
End: 2022-07-27
Payer: MEDICARE

## 2022-07-27 ENCOUNTER — TELEPHONE (OUTPATIENT)
Dept: INTERNAL MEDICINE | Facility: CLINIC | Age: 82
End: 2022-07-27
Payer: MEDICARE

## 2022-07-27 DIAGNOSIS — M54.16 LUMBAR RADICULOPATHY: Primary | ICD-10-CM

## 2022-07-27 PROCEDURE — 97110 THERAPEUTIC EXERCISES: CPT | Mod: PN,CQ

## 2022-07-27 NOTE — TELEPHONE ENCOUNTER
Patient stated pelvic therapy is $500. The patient stated that her insurance said they were not made aware of this order and it shouldn't cost $500. Pt would like to know what the provider thinks.

## 2022-07-27 NOTE — PROGRESS NOTES
"[facility]   Physical Therapy Treatment Note     Name: Kassandra Rodriguez  Clinic Number: [id number]    Therapy Diagnosis:   Encounter Diagnosis   Name Primary?    Lumbar radiculopathy Yes     Physician: Mesfin Perkins III, MD    Visit Date: 7/27/2022    Encounter Diagnoses   Name Primary?    Lumbar radiculopathy      Chronic right-sided low back pain with right-sided sciatica        Physician: Mesfin Perkins III, MD     Physician Orders: PT Eval and Treat   Medical Diagnosis from Referral: M54.16 (ICD-10-CM) - Lumbar radiculopathy  Evaluation Date: 7/14/2022  Authorization Period Expiration: 8/11/2022  Plan of Care Expiration: 8/11/2022  Visit # / Visits authorized: 3/12      PTA Visit #: 3/5     Time In: 720 am  Time Out: 800 am  Total Billable Time: 40 minutes (3TE)    SUBJECTIVE     Pt reports: "It's nothing like it was - but still has a little growl"  She was compliant with home exercise program.  Response to previous treatment: minimal soreness for a few hours  Functional change: less pain     Pain: 2/10  Location: right back      OBJECTIVE     Objective Measures updated at progress report unless specified.     Treatment     Kassandra received the treatments listed below:      therapeutic exercises to develop strength, endurance, ROM, flexibility, posture and core stabilization for 40 minutes including:   LTR: 20x  SKTC: bilateral 3x30" each   Piriformis stretch: 3x30"  Bridges with posterior pelvic tilt: 20x with 5" holds with green theraband around knees  Clamshell 2 x 10  Hamstring stretches 3x30" bilateral with strap  Prone on rolled towel under forehead 15x5" holds gluteal squeezes  Prone static on forearms with 15 breadths with hips flat  Prone press ups on forearms 20x with chin tucks and  with hips flat  Prone press ups  extended elbows to hands 2x10  Walking laps 3 x 1 trial  Hook lying brace marching 2 trials of 1 minute    manual therapy techniques: Joint mobilizations, Manual " "traction, Myofacial release, Soft tissue Mobilization and Friction Massage were applied to the: lumbar spine  for 0 minutes, including:  Not performed       Patient Education and Home Exercises     Home Exercises Provided and Patient Education Provided     Education provided:   - home exercise program     Written Home Exercises Provided: Patient instructed to cont prior HEP. Exercises were reviewed and Kassandra was able to demonstrate them prior to the end of the session.  Kassandra demonstrated good  understanding of the education provided. See EMR under Patient Instructions for exercises provided during therapy sessions    ASSESSMENT     Kassandra presents with minimal pain. Reports good tolerance to previous visit - no exacerbation of pain or soreness greater than a few hours. Continued with her directional preference of extension with good tolerance and no exacerbation of symptoms.  Required max verbal and tactile cues to complete with correct technique and to maintain normal respiration.  States "better - no pain" after treatment. Continue to progress as appropriate.    Kassandra Is progressing well towards her goals.   Pt prognosis is Good.     Pt will continue to benefit from skilled outpatient physical therapy to address the deficits listed in the problem list box on initial evaluation, provide pt/family education and to maximize pt's level of independence in the home and community environment.     Pt's spiritual, cultural and educational needs considered and pt agreeable to plan of care and goals.     Anticipated barriers to physical therapy: age    Goals:   Short Term Goals: 4 weeks  1. Patient will be independent with HEP in order to supplement pain free lumbar ROM - PROGRESSING, NOT MET  2. Pt will improve hip flexibility to WNL to promote functional mobility - PROGRESSING, NOT MET  3. Patient will display the ability to perform an isometric transverse abdominis isometric contraction in supine for improved trunk stability " - PROGRESSING, NOT MET      Long Term Goals: 8 weeks   1. Pt will improve lumbar FOTO survey to </=32% limited in order to return to ADLs without limitation - PROGRESSING, NOT MET  2. Patient will improve hip flexion, abduction, and extension strength to a 5/5 bilaterally for improved trunk support. - PROGRESSING, NOT MET  3. Pt will report no pain during lumbar AROM in order to promote functional mobility - PROGRESSING, NOT MET  4. Patient will display the ability and understanding of performing a proper hip hinge with quality movement coordination for lifting mechanics. - PROGRESSING, NOT MET      PLAN     Continue PT plan of care     Sanam Peralta, PTA

## 2022-07-27 NOTE — TELEPHONE ENCOUNTER
Information given to the patient. Pt states that she will call on tomorrow.       [place] office   [phone]      Previous Messages    ----- Message -----   From: Xu Alexandre MA   Sent: 7/27/2022   2:46 PM CDT   To: Mesfin Perkins III, MD     ----- Message from Xu Alexandre MA sent at 7/27/2022  2:46 PM CDT -----   Thanks. Do you have number or know where this department is located?   They can reach out to the [place]  office for more information   ----- Message -----   From: Xu Alexandre MA   Sent: 7/27/2022   2:05 PM CDT   To: Mesfin Perkins III, MD

## 2022-07-27 NOTE — TELEPHONE ENCOUNTER
----- Message from Makenzie Marco sent at 7/27/2022  7:57 AM CDT -----  Regarding: Pelvic Therapy Question  Good morning,     Pt requesting a call in regards to a pelvic floor therapy appointment that was scheduled for 09/12/2022, but the pt cancelled the appointment. Pt stated that she was told she would have to pay $500.00 even with her insurance. Pt would like to speak with Dr. Perkins about this. Please contact the pt after 4pm at either of the numbers listed in her chart.    Thank you

## 2022-08-01 ENCOUNTER — HOSPITAL ENCOUNTER (OUTPATIENT)
Dept: RADIOLOGY | Facility: HOSPITAL | Age: 82
Discharge: HOME | End: 2022-08-01
Attending: INTERNAL MEDICINE
Payer: MEDICARE

## 2022-08-01 DIAGNOSIS — Z12.31 ENCOUNTER FOR SCREENING MAMMOGRAM FOR BREAST CANCER: ICD-10-CM

## 2022-08-01 PROCEDURE — 77063 BREAST TOMOSYNTHESIS BI: CPT | Mod: TC

## 2022-08-01 PROCEDURE — 77063 MAMMO DIGITAL SCREENING BILAT WITH TOMO: ICD-10-PCS | Mod: 26,,, | Performed by: RADIOLOGY

## 2022-08-01 PROCEDURE — 77067 MAMMO DIGITAL SCREENING BILAT WITH TOMO: ICD-10-PCS | Mod: 26,,, | Performed by: RADIOLOGY

## 2022-08-01 PROCEDURE — 77067 SCR MAMMO BI INCL CAD: CPT | Mod: 26,,, | Performed by: RADIOLOGY

## 2022-08-01 PROCEDURE — 77063 BREAST TOMOSYNTHESIS BI: CPT | Mod: 26,,, | Performed by: RADIOLOGY

## 2022-08-02 ENCOUNTER — CLINICAL SUPPORT (OUTPATIENT)
Dept: REHABILITATION | Facility: HOSPITAL | Age: 82
End: 2022-08-02
Payer: MEDICARE

## 2022-08-02 ENCOUNTER — TELEPHONE (OUTPATIENT)
Dept: INTERNAL MEDICINE | Facility: CLINIC | Age: 82
End: 2022-08-02
Payer: MEDICARE

## 2022-08-02 DIAGNOSIS — M54.16 LUMBAR RADICULOPATHY: Primary | ICD-10-CM

## 2022-08-02 PROCEDURE — 97530 THERAPEUTIC ACTIVITIES: CPT | Mod: PN

## 2022-08-02 NOTE — PROGRESS NOTES
Please call an update- thanks!!!        I have reviewed the results of your mammogram and everything looks normal. Please don't hesitate to reach out to me if you have any questions regarding the test. We and repeat this in one year. Have a great day!

## 2022-08-02 NOTE — PROGRESS NOTES
"[facility]   Physical Therapy Treatment Note / Discharge Note     Name: Kassandra Rodriguez  Clinic Number: [id number]    Therapy Diagnosis:   Encounter Diagnosis   Name Primary?    Lumbar radiculopathy Yes     Physician: Mesfin Perkins III, MD    Visit Date: 8/2/2022     Physician Orders: PT Eval and Treat   Medical Diagnosis from Referral: M54.16 (ICD-10-CM) - Lumbar radiculopathy  Evaluation Date: 7/14/2022  Authorization Period Expiration: 8/11/2022  Plan of Care Expiration: 8/11/2022  Visit # / Visits authorized: 5/12      PTA Visit #: 3/5     Time In: 10:00 am  Time Out: 10:30 am  Total Billable Time: 30 minutes (2 TA)    SUBJECTIVE     Pt reports: she is doing much better and no longer having any intense low back pain. She reports knowing what to do at this time and she is agreeable to discharge today. She plans to start pelvic floor PT next month  She was compliant with home exercise program.  Response to previous treatment: minimal soreness for a few hours  Functional change: less pain     Pain: 0/10  Location: right back      OBJECTIVE   (8/2/2022):     FOTO    Lumbar Range of Motion:     Degrees Pain   Flexion  60 / 70 degrees     no pain   Thoreau's sign ( - )   Extension  10 / 15 degrees     "Feels good"   Left Side Bending  Limited but improved  no pain   Right Side Bending  Limited but improved  no pain   Left rotation     Limited  Tightness   Right Rotation     Limited  Tightness         Hip Range of Motion:    Left Passive Right Passive   External Rotation  25 / 30 degrees  25 / 32 degrees   Internal Rotation  30 / 40 degrees  30 / 35 degrees   Flexion  105 degrees  105 degrees          Treatment     Kassandra received the treatments listed below:    Therapeutic activities to improve functional performance for 30 minutes, including:  FOTO  Objective tests and measures  Outcome measures  Home exercise program overview   Questions and answers   Discharge       Patient Education and Home " Exercises     Home Exercises Provided and Patient Education Provided     Education provided:   - home exercise program     Written Home Exercises Provided: Patient instructed to cont prior HEP. Exercises were reviewed and Kassandra was able to demonstrate them prior to the end of the session.  Kassandra demonstrated good  understanding of the education provided. See EMR under Patient Instructions for exercises provided during therapy sessions    ASSESSMENT   Kassandra BOBO is a 81 y.o. female referred to outpatient Physical Therapy with a medical diagnosis of lumbar radiculopathy. Patient has signs and symptoms presenting as low back pain due to core instability and right deep gluteal tendinopathy. Patient has progressed very well in physical therapy with reports of no pain and compliance with home exercise program. Patient still displays deficits into hip external rotation and patient was provided with updated home exercise program and discharged from physical therapy.     Kassandra Is progressing well towards her goals.   Pt prognosis is Good.     Pt will continue to benefit from skilled outpatient physical therapy to address the deficits listed in the problem list box on initial evaluation, provide pt/family education and to maximize pt's level of independence in the home and community environment.     Pt's spiritual, cultural and educational needs considered and pt agreeable to plan of care and goals.     Anticipated barriers to physical therapy: age    Goals:   Short Term Goals: 4 weeks  1. Patient will be independent with HEP in order to supplement pain free lumbar ROM - PROGRESSING, NOT MET  2. Pt will improve hip flexibility to WNL to promote functional mobility - PROGRESSING, NOT MET  3. Patient will display the ability to perform an isometric transverse abdominis isometric contraction in supine for improved trunk stability - PROGRESSING, NOT MET      Long Term Goals: 8 weeks   1. Pt will improve lumbar FOTO survey to </=32%  limited in order to return to ADLs without limitation - PROGRESSING, NOT MET  2. Patient will improve hip flexion, abduction, and extension strength to a 5/5 bilaterally for improved trunk support. - PROGRESSING, NOT MET  3. Pt will report no pain during lumbar AROM in order to promote functional mobility - PROGRESSING, NOT MET  4. Patient will display the ability and understanding of performing a proper hip hinge with quality movement coordination for lifting mechanics. - PROGRESSING, NOT MET      PLAN     Continue PT plan of care     Luis Anguiano, PT

## 2022-08-02 NOTE — TELEPHONE ENCOUNTER
----- Message from Mesfin Perkins III, MD sent at 8/2/2022 11:33 AM CDT -----  Please call an update- thanks!!!        I have reviewed the results of your mammogram and everything looks normal. Please don't hesitate to reach out to me if you have any questions regarding the test. We and repeat this in one year. Have a great day!

## 2022-08-23 ENCOUNTER — PES CALL (OUTPATIENT)
Dept: ADMINISTRATIVE | Facility: CLINIC | Age: 82
End: 2022-08-23
Payer: MEDICARE

## 2022-08-29 ENCOUNTER — PES CALL (OUTPATIENT)
Dept: ADMINISTRATIVE | Facility: CLINIC | Age: 82
End: 2022-08-29
Payer: MEDICARE

## 2022-09-07 NOTE — PROGRESS NOTES
"[facility]   Physical Therapy Treatment Note     Name: Kassandra Rodriguez  Clinic Number: [id number]    Therapy Diagnosis:   Encounter Diagnosis   Name Primary?    Lumbar radiculopathy Yes     Physician: Mesfin Perkins III, MD    Visit Date: 7/19/2022    Encounter Diagnoses   Name Primary?    Lumbar radiculopathy      Chronic right-sided low back pain with right-sided sciatica        Physician: Mesfin Perkins III, MD     Physician Orders: PT Eval and Treat   Medical Diagnosis from Referral: M54.16 (ICD-10-CM) - Lumbar radiculopathy  Evaluation Date: 7/14/2022  Authorization Period Expiration: 8/11/2022  Plan of Care Expiration: 8/11/2022  Visit # / Visits authorized: 2/12      PTA Visit #: 1/5     Time In: 1345  Time Out: 1440   Total Billable Time:  30 minutes    SUBJECTIVE     Pt reports: significant improvement in terms of decreased pain and improved function   She was compliant with home exercise program.  Response to previous treatment: no adverse effects   Functional change: less pain     Pain: 3/10  Location: right back      OBJECTIVE     Objective Measures updated at progress report unless specified.     Treatment     Kassandra received the treatments listed below:      therapeutic exercises to develop strength, endurance, ROM, flexibility, posture and core stabilization for 30 minutes including: Additional time supervised  LTR: 20x  SKTC: bilateral 3x30" each   Piriformis stretch: 3x30"  Bridges with posterior pelvic tilt: 20x with 5" holds with green theraband around knees  Hamstring stretches 3x30" bilateral with strap  Prone on rolled towel under forehead 15x5" holds gluteal squeezes  Prone static on forearms with 15 breadths with hips flat  Prone press ups on forearms 20x with chin tucks and  with hips flat  Prone press ups  extended elbows to hands 2x10  Walking laps 3 with not return of pain.   Supine hamstring stretches 3x30" bilateral   Hook lying brace marching 2 trials of 1 " minute        manual therapy techniques: Joint mobilizations, Manual traction, Myofacial release, Soft tissue Mobilization and Friction Massage were applied to the: lumbar spine  for 0 minutes, including:  Not performed             Patient Education and Home Exercises     Home Exercises Provided and Patient Education Provided     Education provided:   - home exercise program     Written Home Exercises Provided: Patient instructed to cont prior HEP. Exercises were reviewed and Kassandra was able to demonstrate them prior to the end of the session.  Kassandra demonstrated good  understanding of the education provided. See EMR under Patient Instructions for exercises provided during therapy sessions    ASSESSMENT     Kassandra tolerated her first follow up very well. She was compliant with her home exercise program and reports excellent feedback of decreased pain. She presents only 3/10 right low back pain currently. Feedback of her symptoms today indicates her directional preference is extension. We were able to reduce and then abolish her left low back pain with prone press ups. This was then followed with stabilization core strengthening and functional walking laps. She was instructed to add  prone press ups to extended elbows to her home exercise program and perform 3x daily. Her pain did not return and exited clinic pain free. She did report appropriate fatigue.     Kassandra Is progressing well towards her goals.   Pt prognosis is Good.     Pt will continue to benefit from skilled outpatient physical therapy to address the deficits listed in the problem list box on initial evaluation, provide pt/family education and to maximize pt's level of independence in the home and community environment.     Pt's spiritual, cultural and educational needs considered and pt agreeable to plan of care and goals.     Anticipated barriers to physical therapy: age    Goals:   Short Term Goals: 4 weeks  1. Patient will be independent with HEP in order  to supplement pain free lumbar ROM - PROGRESSING, NOT MET  2. Pt will improve hip flexibility to WNL to promote functional mobility - PROGRESSING, NOT MET  3. Patient will display the ability to perform an isometric transverse abdominis isometric contraction in supine for improved trunk stability - PROGRESSING, NOT MET      Long Term Goals: 8 weeks   1. Pt will improve lumbar FOTO survey to </=32% limited in order to return to ADLs without limitation - PROGRESSING, NOT MET  2. Patient will improve hip flexion, abduction, and extension strength to a 5/5 bilaterally for improved trunk support. - PROGRESSING, NOT MET  3. Pt will report no pain during lumbar AROM in order to promote functional mobility - PROGRESSING, NOT MET  4. Patient will display the ability and understanding of performing a proper hip hinge with quality movement coordination for lifting mechanics. - PROGRESSING, NOT MET      PLAN     Continue PT plan of care     Johnson Stern, PTA      no

## 2022-09-19 PROBLEM — M54.50 LOW BACK PAIN: Status: RESOLVED | Noted: 2022-07-19 | Resolved: 2022-09-19

## 2022-09-19 PROBLEM — M54.16 LUMBAR RADICULOPATHY: Status: RESOLVED | Noted: 2022-07-19 | Resolved: 2022-09-19

## 2022-10-03 ENCOUNTER — CLINICAL SUPPORT (OUTPATIENT)
Dept: REHABILITATION | Facility: HOSPITAL | Age: 82
End: 2022-10-03
Payer: MEDICARE

## 2022-10-03 DIAGNOSIS — R53.1 WEAKNESS: ICD-10-CM

## 2022-10-03 DIAGNOSIS — M62.89 PELVIC FLOOR DYSFUNCTION: ICD-10-CM

## 2022-10-03 DIAGNOSIS — N39.3 STRESS INCONTINENCE: ICD-10-CM

## 2022-10-03 PROCEDURE — 97140 MANUAL THERAPY 1/> REGIONS: CPT | Mod: KX,PO

## 2022-10-03 PROCEDURE — 97161 PT EVAL LOW COMPLEX 20 MIN: CPT | Mod: KX,PO

## 2022-10-03 NOTE — PLAN OF CARE
[facility]   Physical Therapy Initial Evaluation     Date: 10/3/2022   Name: Kassandra Rodriguez  Clinic Number: [id number]    Therapy Diagnosis:   Encounter Diagnoses   Name Primary?    Stress incontinence     Pelvic floor dysfunction     Weakness      Physician: Cindy Corral NP    Physician Orders: PT Eval and Treat   Medical Diagnosis from Referral: stress incontinence   Evaluation Date: 10/3/2022  Authorization Period Expiration: 2023  Plan of Care Expiration: 22  Progress Note Due: 11-3-22  Visit # / Visits authorized:    FOTO: 1/3    Precautions: Standard and Fall     Time In: 1310 (checked in late)  Time Out: 1400  Total Appointment Time (timed & untimed codes): 50 minutes    SUBJECTIVE     Date of onset: several years ago     History of current condition - Kassandra reports: She has issues with stress urinary incontinence.  She recently started taking myrbetriq which helped some but the copay for the prescription was too expensive and she will not be able to continue taking it.      OB/GYN History:  and vaginal delivery  Using vaginal estrogen cream: No  Sexually active? No  Pain with vaginal exams, intercourse or tampon use? with vaginal exam    Bladder/Bowel History:   Frequency of urination:   Daytime: every 2 hours           Nighttime: 0-1x  Difficulty initiating urine stream: No  Urine stream: strong  Complete emptying: Yes  Bladder leakage: Yes  Activities that cause leakage: coughing, walking, sneezing  Frequency of incidents: daily   Amount leaked (urine): moderate amount and will wet her outerwear   Urinary Urgency: Yes.  Able to delay the urge for at least 1 minute(s).  Pain with delaying the urge to urinate: No     Frequency of bowel movements: once every 1-2 days  Difficulty initiating BM: No  Quality/Shape of BM: [place] Stool Chart 4  Does Patient Feel Empty after BM? Yes  Bowel Urgency: No.  Able to delay the urge for at least 15 minute(s).  Fiber  Supplements or Laxative Use? No   Pain with BM: No   Bleeding with BM: No   Colon leakage: No  She reports her stool is occasionally black but normally it's light in color.  (Advised patient to reach out to MD regarding stool colors)       Form of protection: pad  Number of pads required in 24 hours: 3-4    Pain:  Location: denies back, hip, or pelvic pain.       Medical History: Kassandra  has a past medical history of Hearing loss and Hypertension.     Surgical History: Kassandra Rodriguez  has a past surgical history that includes Laparoscopic cholecystectomy.    Medications: Kassandra BOBO has a current medication list which includes the following prescription(s): amlodipine, aspirin, calcium-vitamin d, clobetasol 0.05%, conjugated estrogens, fluticasone propionate, myrbetriq, and multivit-mins no.63/iron/folic.    Allergies:   Review of patient's allergies indicates:   Allergen Reactions    Penicillins Other (See Comments)          Prior Therapy/Previous treatment included: none reported   Social History: lives alone  Current exercise: mat exercises  Occupation: retired   Prior Level of Function: Pt was independent with all ADLs and iADLs without pain, no reports of incontinence of bowel or bladder.  Current Level of Function: stress urinary incontinence with activities of daily living     Types of fluid intake: Water: 3-4 bottles/day, Iced tea, and cold drinks every other week  Diet: Standard  Habitus: well developed, well nourished  Abuse/Neglect: Pt denies a history of physical or emotional abuse at this visit.         Constitutional Symptoms Review: Sweats (can occur anytime night or day) and Fatigue         Pts goals: eliminate stress urinary incontinence     OBJECTIVE       ORTHO SCREEN  Posture in sitting: slouched  and sits asymmetrically with weight shifted off of right ischial tuberosity   Posture in standing: forward head, forward and rounded shoulders , and increased kyphosis  Pelvic alignment: Not assessed today       ABDOMINAL WALL ASSESSMENT  Palpation: tender and increased tension  in the left lower quadrant   Abdominal strength: Rectus abdominus: 4-/5     Transverse abdominus: 4-/5  Scarring: none noted  Pelvic Floor Muscle and Transverse Abdominus Synergy: absent  Diastasis: not assessed      BREATHING MECHANICS ASSESSMENT   Thorax Assessment During Quiet Respiration: Decreased excursion of lateral ribs, Decreased excursion of anterior ribs, Decreased excursion of posterior ribs, Asymmetrical , and patient bulges abdomindal wall and contracts   Thorax Assessment During Deep Respiration: Decreased excursion of lateral ribs, Decreased excursion of anterior ribs, Decreased excursion of posterior ribs, and continues to bulge abdominal wall and contract vs letting the muscle(s) expand with the breath     VAGINAL PELVIC FLOOR EXAM  Deferred due to patient request.      Limitation/Restriction for FOTO Urinary Problem Survey    Therapist reviewed FOTO scores for Kassandra Rodriguez on 10/3/2022.   FOTO documents entered into EPIC - see Media section.    Limitation Score: 54%       TREATMENT     Treatment Time In: 1340  Treatment Time Out: 1400  Total Treatment time (time-based codes) separate from Evaluation: 20 minutes      Manual Therapy to develop flexibility and extensibility for 10 minutes including:   soft tissue mobilization of abdominal wall suprapubic region      Therapeutic Activity Patient participated in dynamic functional therapeutic activities to improve functional performance for 10 minutes. Including: Education as described below.         Patient Education provided:   general anatomy/physiology of urinary/ bowel  system and benefits of treatment was discussed with the pt. Additionally, anatomy/physiology of pelvic floor was reviewed.     Home Exercises provided:  Written Home Exercises provided: yes.  Exercises were reviewed and Kassandra was able to demonstrate them prior to the end of the session.    Kassandra demonstrated good   understanding of the education provided.     See EMR under Patient Instructions for exercises provided 10/3/2022.    ASSESSMENT     Kassandra BOBO is a 81 y.o. female referred to outpatient Physical Therapy with a medical diagnosis of stress urinary incontinence. Pt presents with reports of chronic and worsening urinary incontinence that is affecting activities of daily living participation.  Patient declines pelvic floor muscle(s) assessment today but reports she is open to having the assessment performed at the next visit.  She is noted to have abdominal wall weakness and altered breathing mechanics that are likely contributing to pelvic floor dysfunction. She is expected to benefit from skilled intervention to work towards reduction of urinary incontinence in order to better participate in activities of daily living.      Pt prognosis is Good.   Pt will benefit from skilled outpatient Physical Therapy to address the deficits stated above and in the chart below, provide pt/family education, and to maximize pt's level of independence.     Plan of care discussed with patient: Yes  Pt's spiritual, cultural and educational needs considered and patient is agreeable to the plan of care and goals as stated below:     Anticipated Barriers for therapy: none    Medical Necessity is demonstrated by the following:    History  Co-morbidities and personal factors that may impact the plan of care Co-morbidities   HTN, laparoscopic cholecystectomy    Personal Factors  no deficits     low   Examination  Body structures and functions, activity limitations and participation restrictions that may impact the plan of care Body Regions/Systems/Functions:  poor coordination of pelvic floor muscles during ADL's leading to urinary or fecal leakage     Activity Limitations:  urgency , delaying urge to urinate, and incontinence with ADLs    Participation Restrictions:  all ADLs/iADLs uninterrupted by urinary  incontinence/urgency/frequency    Activity limitations:   Learning and applying knowledge  no deficits    General Tasks and Commands  no deficits    Communication  no deficits    Mobility  no deficits    Self care  no deficits    Domestic Life  no deficits    Interactions/Relationships  no deficits    Life Areas  no deficits    Community and Social Life  no deficits       low   Clinical Presentation stable and uncomplicated low   Decision Making/ Complexity Score: low       Goals:  Short Term Goals: 4 weeks   Pt to report a decrease in pad usage to no more than 2 a day to demonstrate improving pelvic floor function needed for continence.  Pt to demonstrate an improved score in the FOTO Urinary Problem survey  to at least 58 to demonstrate improving pelvic floor muscle(s) function with adls.    Pt to demonstrate being able to correctly and consistently perform a kegel which is needed  to increase pelvic floor muscle coordination and strength needed for continence.  Pt to be able to delay the urge to urinate at least 5 minutes with a strong urge to urinate in order to make it to the bathroom without leaking.  Pt to voice understanding of the role that diet plays on urinary urgency.        Long Term Goals: 12 weeks   Pt to be discharged with home plan for carry over after discharge.    Pt to report a decrease in pad usage to 0 pads a day to demonstrate improving pelvic floor muscle controls as evidenced by decreased episodes of incontinence needed to improve confidence in social situations.  Pt to report an 80% reduction of urinary incontinence symptoms with ADL participation thereby demonstrating improved pelvic floor muscle control and strength.   Pt to demonstrate an improved score in the FOTO Urinary Problem survey  to at least 62 to demonstrate improving pelvic floor muscle(s) function with adls.    Pt to be able to delay the urge to urinate at least 10 minutes with a strong urge to urinate in order to make it to  the bathroom without leaking.      PLAN   Plan of care Certification: 10/3/2022 to 12-25-22.    Outpatient Physical Therapy 2 times weekly for 12 weeks to include the following interventions: therapeutic exercises, therapeutic activity, neuromuscular re-education, gait training, manual therapy, modalities PRN, patient/family education, dry needling, and self care/home management    Kassandra Goyal, PT      I CERTIFY THE NEED FOR THESE SERVICES FURNISHED UNDER THIS PLAN OF TREATMENT AND WHILE UNDER MY CARE   Physician's comments:     Physician's Signature: ___________________________________________________

## 2022-10-03 NOTE — PATIENT INSTRUCTIONS
"Home Exercise Program: 10/03/2022     ABDOMINAL WALL MOBILIZATION  - Gently massage your abdominal wall muscles in all directions both superficially and deeply.   - Apply as much pressure as you are able to tolerate without more than a slight increase in discomfort.    - While applying pressure you can perform circular motions or you can "scoop and pull" your tissue up for a sustained stretch.      Do for 5 minutes every day.     "

## 2022-10-04 PROBLEM — M62.89 PELVIC FLOOR DYSFUNCTION: Status: ACTIVE | Noted: 2022-10-04

## 2022-10-04 PROBLEM — R53.1 WEAKNESS: Status: ACTIVE | Noted: 2022-10-04

## 2022-10-17 ENCOUNTER — CLINICAL SUPPORT (OUTPATIENT)
Dept: REHABILITATION | Facility: HOSPITAL | Age: 82
End: 2022-10-17
Payer: MEDICARE

## 2022-10-17 DIAGNOSIS — R53.1 WEAKNESS: ICD-10-CM

## 2022-10-17 DIAGNOSIS — M62.89 PELVIC FLOOR DYSFUNCTION: Primary | ICD-10-CM

## 2022-10-17 PROCEDURE — 97110 THERAPEUTIC EXERCISES: CPT | Mod: PO

## 2022-10-17 PROCEDURE — 97140 MANUAL THERAPY 1/> REGIONS: CPT | Mod: PO

## 2022-10-17 NOTE — PROGRESS NOTES
Pelvic Health Physical Therapy   Treatment Note     Name: Kassandra Rodriguez  Clinic Number: [id number]    Therapy Diagnosis:   Encounter Diagnoses   Name Primary?    Pelvic floor dysfunction Yes    Weakness      Physician: Cindy Corral NP    Visit Date: 10/17/2022    Physician Orders: PT Eval and Treat   Medical Diagnosis from Referral: stress incontinence   Evaluation Date: 10/3/2022  Authorization Period Expiration: 07/22/2023  Plan of Care Expiration: 12-25-22  Progress Note Due: 11-3-22  Visit #/Visits authorized: 2/ pending    Cancelled Visits: 0  No Show Visits: 0  FOTO: Issued Vist #:  1/3 on 10/3/22    Time In: 802  Time Out: 857  Total Billable Time: 55 minutes    Precautions: Standard and Fall    Subjective     Pt reports: No new c/o since last visit.  She was compliant with home exercise program.  Response to previous treatment: no issues reported  Functional change: no changes reported     Pain: 0/10  Location: NA    Constitutional Symptoms Review: The patient denies having any constitutional symptoms.     Objective   Pt verbally consents to intravaginal treatment today.  Signed consent form already on file.     See EMR under MEDIA for written consent provided 10/17/2022  Chaperone: declined      VAGINAL PELVIC FLOOR EXAM    EXTERNAL ASSESSMENT  Introitus: WNL  Skin condition: WNL  Scarring: none   Sensation: WNL   Pain:  none  Voluntary contraction: visible lift and accessory muscle use  Voluntary relaxation: visible drop  Involuntary contraction: bulge  Bearing down: visible drop  Perineal descent: present        INTERNAL ASSESSMENT  Pain: none   Sensation: able to localized pressure appropriately   Vaginal vault: roomy   Muscle Bulk: atrophy   Muscle Power: 2/5  Muscle Endurance: 3 sec  # Reps To Fatigue: 4    Fast Contractions in 10 seconds: Not tested     Quality of contraction: slow relaxation   Specificity: patient contracts: gluts, abdominal wall muscle(s) and adductors    Coordination: tends to  hold breath during PFM contration   Prolapse check:not assessed  Comments: needs cues to fully relax after each rep         Kassandra received therapeutic exercises to develop  strength, endurance, and flexibility for 40 minutes including: Kegels: Quick flicks   Kegel edu and practice.  Increased time spent on edu on proper technique.  Pt improves with practice and verbal cues. Not able to coordinate pelvic floor muscle(s) contraction with the breath at this time.    Hip adduction with ball squeeze 5 sec x 10 reps  Hip adduction with ball squeeze and bridge 5 sec x 10 reps  Posterior pelvic tilt 5 sec x 10 reps   Clamshells x 10 bilaterally   Reverse clamshells x 10 bilaterally         Kassandra received the following manual therapy techniques: to develop flexibility and extensibility for 15 minutes including: soft tissue mobilization of abdominal muscle(s) in the suprapubic region to help decrease strain to pelvic structures.      Home Exercises Provided and Patient Education Provided     Education provided:   - anatomy/physiology of pelvic floor and Coordination of kegels with functional activities such as cough, laugh, sneeze, lift, etc.   Discussed progression of plan of care with patient; educated pt in activity modification; reviewed HEP with pt. Pt demonstrated and verbalized understanding of all instruction and was provided with a handout of HEP (see Patient Instructions).  -     Written Home Exercises Provided: yes.  Exercises were reviewed and Kassandra was able to demonstrate them prior to the end of the session.  Kassandra demonstrated good  understanding of the education provided.     See EMR under Media for exercises provided 10/17/2022.    Assessment     Kegel edu and practice.  Increased time spent on edu on proper technique.  Pt improves with practice and verbal cues. Not able to coordinate pelvic floor muscle(s) contraction with the breath at this time.  Also performed soft tissue mobilization of abdominal muscle(s)  in the suprapubic region to help decrease strain to pelvic structures as patient was noted to have increased resting tension.  Initiated hip and pelvic floor muscle(s) strengthening and coordination training today.      Kassandra Is progressing well towards her goals.   Pt prognosis is Excellent.     Pt will continue to benefit from skilled outpatient physical therapy to address the deficits listed in the problem list box on initial evaluation, provide pt/family education and to maximize pt's level of independence in the home and community environment.     Pt's spiritual, cultural and educational needs considered and pt agreeable to plan of care and goals.     Anticipated barriers to physical therapy: none reported    Goals:   Short Term Goals: 4 weeks   Pt to report a decrease in pad usage to no more than 2 a day to demonstrate improving pelvic floor function needed for continence.  Pt to demonstrate an improved score in the FOTO Urinary Problem survey  to at least 58 to demonstrate improving pelvic floor muscle(s) function with adls.    Pt to demonstrate being able to correctly and consistently perform a kegel which is needed  to increase pelvic floor muscle coordination and strength needed for continence.  Pt to be able to delay the urge to urinate at least 5 minutes with a strong urge to urinate in order to make it to the bathroom without leaking.  Pt to voice understanding of the role that diet plays on urinary urgency.          Long Term Goals: 12 weeks   Pt to be discharged with home plan for carry over after discharge.    Pt to report a decrease in pad usage to 0 pads a day to demonstrate improving pelvic floor muscle controls as evidenced by decreased episodes of incontinence needed to improve confidence in social situations.  Pt to report an 80% reduction of urinary incontinence symptoms with ADL participation thereby demonstrating improved pelvic floor muscle control and strength.   Pt to demonstrate an  improved score in the FOTO Urinary Problem survey  to at least 62 to demonstrate improving pelvic floor muscle(s) function with adls.    Pt to be able to delay the urge to urinate at least 10 minutes with a strong urge to urinate in order to make it to the bathroom without leaking.    Plan     Plan of care Certification: 10/3/2022 to 12-25-22.     Outpatient Physical Therapy 2 times weekly for 12 weeks to include the following interventions: therapeutic exercises, therapeutic activity, neuromuscular re-education, gait training, manual therapy, modalities PRN, patient/family education, dry needling, and self care/home management    Kassandra Goyal, PT

## 2022-10-19 ENCOUNTER — IMMUNIZATION (OUTPATIENT)
Dept: INTERNAL MEDICINE | Facility: CLINIC | Age: 82
End: 2022-10-19
Payer: MEDICARE

## 2022-10-19 ENCOUNTER — OFFICE VISIT (OUTPATIENT)
Dept: UROLOGY | Facility: CLINIC | Age: 82
End: 2022-10-19
Payer: MEDICARE

## 2022-10-19 VITALS
DIASTOLIC BLOOD PRESSURE: 71 MMHG | SYSTOLIC BLOOD PRESSURE: 118 MMHG | WEIGHT: 150 LBS | HEART RATE: 77 BPM | BODY MASS INDEX: 28.34 KG/M2

## 2022-10-19 DIAGNOSIS — N95.2 ATROPHIC VAGINITIS: Primary | ICD-10-CM

## 2022-10-19 DIAGNOSIS — Z23 NEED FOR VACCINATION: Primary | ICD-10-CM

## 2022-10-19 DIAGNOSIS — N39.3 STRESS INCONTINENCE: ICD-10-CM

## 2022-10-19 PROCEDURE — 3074F PR MOST RECENT SYSTOLIC BLOOD PRESSURE < 130 MM HG: ICD-10-PCS | Mod: CPTII,S$GLB,, | Performed by: NURSE PRACTITIONER

## 2022-10-19 PROCEDURE — 99999 PR PBB SHADOW E&M-EST. PATIENT-LVL III: ICD-10-PCS | Mod: PBBFAC,,, | Performed by: NURSE PRACTITIONER

## 2022-10-19 PROCEDURE — 1159F PR MEDICATION LIST DOCUMENTED IN MEDICAL RECORD: ICD-10-PCS | Mod: CPTII,S$GLB,, | Performed by: NURSE PRACTITIONER

## 2022-10-19 PROCEDURE — 91312 COVID-19, MRNA, LNP-S, BIVALENT BOOSTER, PF, 30 MCG/0.3 ML DOSE: ICD-10-PCS | Mod: S$GLB,,, | Performed by: INTERNAL MEDICINE

## 2022-10-19 PROCEDURE — 1159F MED LIST DOCD IN RCRD: CPT | Mod: CPTII,S$GLB,, | Performed by: NURSE PRACTITIONER

## 2022-10-19 PROCEDURE — 99999 PR PBB SHADOW E&M-EST. PATIENT-LVL III: CPT | Mod: PBBFAC,,, | Performed by: NURSE PRACTITIONER

## 2022-10-19 PROCEDURE — 1101F PT FALLS ASSESS-DOCD LE1/YR: CPT | Mod: CPTII,S$GLB,, | Performed by: NURSE PRACTITIONER

## 2022-10-19 PROCEDURE — 1160F RVW MEDS BY RX/DR IN RCRD: CPT | Mod: CPTII,S$GLB,, | Performed by: NURSE PRACTITIONER

## 2022-10-19 PROCEDURE — 3288F FALL RISK ASSESSMENT DOCD: CPT | Mod: CPTII,S$GLB,, | Performed by: NURSE PRACTITIONER

## 2022-10-19 PROCEDURE — 1160F PR REVIEW ALL MEDS BY PRESCRIBER/CLIN PHARMACIST DOCUMENTED: ICD-10-PCS | Mod: CPTII,S$GLB,, | Performed by: NURSE PRACTITIONER

## 2022-10-19 PROCEDURE — 3074F SYST BP LT 130 MM HG: CPT | Mod: CPTII,S$GLB,, | Performed by: NURSE PRACTITIONER

## 2022-10-19 PROCEDURE — 3078F DIAST BP <80 MM HG: CPT | Mod: CPTII,S$GLB,, | Performed by: NURSE PRACTITIONER

## 2022-10-19 PROCEDURE — 99213 OFFICE O/P EST LOW 20 MIN: CPT | Mod: S$GLB,,, | Performed by: NURSE PRACTITIONER

## 2022-10-19 PROCEDURE — 91312 COVID-19, MRNA, LNP-S, BIVALENT BOOSTER, PF, 30 MCG/0.3 ML DOSE: CPT | Mod: S$GLB,,, | Performed by: INTERNAL MEDICINE

## 2022-10-19 PROCEDURE — 3288F PR FALLS RISK ASSESSMENT DOCUMENTED: ICD-10-PCS | Mod: CPTII,S$GLB,, | Performed by: NURSE PRACTITIONER

## 2022-10-19 PROCEDURE — 0124A COVID-19, MRNA, LNP-S, BIVALENT BOOSTER, PF, 30 MCG/0.3 ML DOSE: CPT | Mod: CV19,PBBFAC | Performed by: INTERNAL MEDICINE

## 2022-10-19 PROCEDURE — 99213 PR OFFICE/OUTPT VISIT, EST, LEVL III, 20-29 MIN: ICD-10-PCS | Mod: S$GLB,,, | Performed by: NURSE PRACTITIONER

## 2022-10-19 PROCEDURE — 3078F PR MOST RECENT DIASTOLIC BLOOD PRESSURE < 80 MM HG: ICD-10-PCS | Mod: CPTII,S$GLB,, | Performed by: NURSE PRACTITIONER

## 2022-10-19 PROCEDURE — 1101F PR PT FALLS ASSESS DOC 0-1 FALLS W/OUT INJ PAST YR: ICD-10-PCS | Mod: CPTII,S$GLB,, | Performed by: NURSE PRACTITIONER

## 2022-10-19 NOTE — PROGRESS NOTES
CHIEF COMPLAINT:    Mrs. Rodriguez is a 81 y.o. female presenting for stress incontinence follow up.    .  PRESENTING ILLNESS:    Kassandra Rodriguez is a 81 y.o. female with a PMH of hx guillain-barre, htn, atrophic vaginitis who presents for stress incontinence follow up.     Established patient to urology department.  Presents today as follow up.  Last seen in urology 7/22/22 for stress incontinence. Prescribed myrbetriq and referred to pelvic floor therapy.  Tried the myrbetriq which helped, however she prefers not to take it.  Has started pelvic floor therapy which is more beneficial.   Now pleased with how she voids.    REVIEW OF SYSTEMS:    Review of Systems   Constitutional:  Negative for chills and fever.   Respiratory:  Negative for shortness of breath.    Cardiovascular:  Negative for chest pain.   Gastrointestinal:  Negative for constipation and diarrhea.   Genitourinary:  Negative for dysuria, flank pain, frequency, hematuria and urgency.   Neurological:  Negative for dizziness and weakness.      PATIENT HISTORY:    Past Medical History:   Diagnosis Date    Hearing loss     Hypertension        No family history on file.    Allergies:  Penicillins    Medications:    Current Outpatient Medications:     amLODIPine (NORVASC) 5 MG tablet, Take 1 tablet (5 mg total) by mouth once daily., Disp: 90 tablet, Rfl: 1    aspirin (ECOTRIN) 81 MG EC tablet, Take 81 mg by mouth once daily., Disp: , Rfl:     calcium-vitamin D 250-100 mg-unit per tablet, Take 1 tablet by mouth 2 (two) times daily., Disp: , Rfl:     clobetasol 0.05% (TEMOVATE) 0.05 % Oint, Apply 1-2 times daily to top of vaginal lips/vulva area, Disp: 30 g, Rfl: 0    conjugated estrogens (PREMARIN) vaginal cream, Apply 1 applicator nightly vaginally  x 2 weeks then nightly 3x per week thereafter, Disp: 30 g, Rfl: 5    fluticasone propionate (FLONASE) 50 mcg/actuation nasal spray, 1 spray (50 mcg total) by Each Nostril route once daily., Disp: 15.8 mL, Rfl: 0     mirabegron (MYRBETRIQ) 25 mg Tb24 ER tablet, Take 1 tablet (25 mg total) by mouth once daily., Disp: 30 tablet, Rfl: 11    multivit-mins no.63/iron/folic (M-VIT ORAL), Take 1 tablet by mouth., Disp: , Rfl:     PHYSICAL EXAMINATION:    Physical Exam  Vitals and nursing note reviewed.   Constitutional:       Appearance: Normal appearance. She is well-developed.   HENT:      Head: Normocephalic and atraumatic.   Eyes:      Pupils: Pupils are equal, round, and reactive to light.   Pulmonary:      Effort: Pulmonary effort is normal.   Genitourinary:     Vagina: Prolapsed vaginal walls present. No tenderness.   Musculoskeletal:         General: Normal range of motion.      Cervical back: Normal range of motion.   Skin:     General: Skin is warm and dry.   Neurological:      Mental Status: She is alert and oriented to person, place, and time.   Psychiatric:         Behavior: Behavior normal.         LABS:    U/a dipstick not performed in office today    IMPRESSION:    Encounter Diagnoses   Name Primary?    Atrophic vaginitis Yes    Stress incontinence          PLAN:    Problem List Items Addressed This Visit       Atrophic vaginitis - Primary     Other Visit Diagnoses       Stress incontinence                1. Stress incontinence   - stopped taking myrbetriq as does not want to be on medication   - continue pelvic floor therapy  2. Vaginal atrophy/dryness   Prescribed premarin cream - patient tried cream and does not believe it helps, would prefer not to use cream  4. rtc in 1 yr or sooner prn    Cindy Corral NP

## 2022-10-24 ENCOUNTER — TELEPHONE (OUTPATIENT)
Dept: REHABILITATION | Facility: HOSPITAL | Age: 82
End: 2022-10-24
Payer: MEDICARE

## 2022-10-24 NOTE — TELEPHONE ENCOUNTER
Called to offer patient an appointment time from my wait list.  She accepted and will be scheduled tomorrow at 3PM.

## 2022-10-26 ENCOUNTER — PES CALL (OUTPATIENT)
Dept: ADMINISTRATIVE | Facility: CLINIC | Age: 82
End: 2022-10-26
Payer: MEDICARE

## 2022-11-10 ENCOUNTER — CLINICAL SUPPORT (OUTPATIENT)
Dept: REHABILITATION | Facility: HOSPITAL | Age: 82
End: 2022-11-10
Attending: NURSE PRACTITIONER
Payer: MEDICARE

## 2022-11-10 DIAGNOSIS — R53.1 WEAKNESS: ICD-10-CM

## 2022-11-10 DIAGNOSIS — M62.89 PELVIC FLOOR DYSFUNCTION: Primary | ICD-10-CM

## 2022-11-10 PROCEDURE — 97110 THERAPEUTIC EXERCISES: CPT | Mod: KX,PO

## 2022-11-10 PROCEDURE — 97140 MANUAL THERAPY 1/> REGIONS: CPT | Mod: KX,PO

## 2022-11-10 NOTE — PROGRESS NOTES
Pelvic Health Physical Therapy   Treatment Note     Name: Kassandra Rodriguez  Clinic Number: [id number]    Therapy Diagnosis:   Encounter Diagnoses   Name Primary?    Pelvic floor dysfunction Yes    Weakness      Physician: Cindy Corral NP    Visit Date: 11/10/2022    Physician Orders: PT Eval and Treat   Medical Diagnosis from Referral: stress incontinence   Evaluation Date: 10/3/2022  Authorization Period Expiration: 07/22/2023  Plan of Care Expiration: 12-25-22  Progress Note Due: 11-3-22  Visit #/Visits authorized: 3/ pending    Cancelled Visits: 0  No Show Visits: 0  FOTO: Issued Vist #:  2/3 on 11/10/22    Time In: 1002  Time Out: 1055  Total Billable Time: 53 minutes    Precautions: Standard and Fall    Subjective     Pt reports: She reports that her urine has a strong smell and looks cloudy.  She is going to get checked for a urinary tract infection.  She reports her bladder control is poor with moving around and doing activities.  Patient reports she is feeling dizzy today.        She was compliant with home exercise program.  Response to previous treatment: no issues reported  Functional change: no changes reported     Pain: 0/10  Location: NA    Constitutional Symptoms Review: The patient denies having any constitutional symptoms.     Objective   Pt verbally consents to intravaginal treatment today.  Signed consent form already on file.     See EMR under MEDIA for written consent provided 11/10/2022  Chaperone: declined      VAGINAL PELVIC FLOOR EXAM    EXTERNAL ASSESSMENT  Introitus: WNL  Skin condition: WNL  Scarring: none   Sensation: WNL   Pain:  none  Voluntary contraction: visible lift and accessory muscle use  Voluntary relaxation: visible drop  Involuntary contraction: bulge  Bearing down: visible drop  Perineal descent: present        INTERNAL ASSESSMENT  Pain: none   Sensation: able to localized pressure appropriately   Vaginal vault: roomy   Muscle Bulk: atrophy   Muscle Power: 2/5  Muscle  Endurance: 3 sec  # Reps To Fatigue: 4    Fast Contractions in 10 seconds: Not tested     Quality of contraction: slow relaxation   Specificity: patient contracts: gluts, abdominal wall muscle(s) and adductors    Coordination: tends to hold breath during PFM contration   Prolapse check:not assessed  Comments: needs cues to fully relax after each rep         Kassandra received therapeutic exercises to develop  strength, endurance, and flexibility for 28 minutes including: Kegels: Quick flicks   Kegel edu and practice.  Increased time spent on edu on proper technique.  Pt improves with practice and verbal cues. Not able to coordinate pelvic floor muscle(s) contraction with the breath at this time.        Held today:  Hip adduction with ball squeeze 5 sec x 10 reps  Hip adduction with ball squeeze and bridge 5 sec x 10 reps  Posterior pelvic tilt 5 sec x 10 reps   Clamshells x 10 bilaterally   Reverse clamshells x 10 bilaterally         Kassandra received the following manual therapy techniques: to develop flexibility and extensibility for 25 minutes including: soft tissue mobilization of abdominal muscle(s) in the suprapubic region to help decrease strain to pelvic structures.      Home Exercises Provided and Patient Education Provided     Education provided:   - anatomy/physiology of pelvic floor and Coordination of kegels with functional activities such as cough, laugh, sneeze, lift, etc.   Discussed progression of plan of care with patient; educated pt in activity modification; reviewed HEP with pt. Pt demonstrated and verbalized understanding of all instruction and was provided with a handout of HEP (see Patient Instructions).  -     Written Home Exercises Provided: yes.  Exercises were reviewed and Kassandra was able to demonstrate them prior to the end of the session.  Kassandra demonstrated good  understanding of the education provided.     See EMR under Media for exercises provided 10/17/2022.    Assessment     Continued working  Kegel edu and practice today.  Increased time spent on edu on proper technique in order to recruit pelvic floor muscle(s) correctly.   Pt improves with practice and verbal cues. Not able to coordinate pelvic floor muscle(s) contraction with the breath at this time but demonstrates improvement compared to at the last visit. Also performed soft tissue mobilization of abdominal muscle(s) in the suprapubic region to help decrease strain to pelvic structures as patient was noted to have increased resting tension.  Held pelvic floor muscle(s) strengthening and coordination training today due to patients reports of feeling dizzy.      Kassandra Is progressing well towards her goals.   Pt prognosis is Excellent.     Pt will continue to benefit from skilled outpatient physical therapy to address the deficits listed in the problem list box on initial evaluation, provide pt/family education and to maximize pt's level of independence in the home and community environment.     Pt's spiritual, cultural and educational needs considered and pt agreeable to plan of care and goals.     Anticipated barriers to physical therapy: none reported    Goals:   Short Term Goals: 4 weeks   Pt to report a decrease in pad usage to no more than 2 a day to demonstrate improving pelvic floor function needed for continence.  Pt to demonstrate an improved score in the FOTO Urinary Problem survey  to at least 58 to demonstrate improving pelvic floor muscle(s) function with adls.    Pt to demonstrate being able to correctly and consistently perform a kegel which is needed  to increase pelvic floor muscle coordination and strength needed for continence.  Pt to be able to delay the urge to urinate at least 5 minutes with a strong urge to urinate in order to make it to the bathroom without leaking.  Pt to voice understanding of the role that diet plays on urinary urgency.          Long Term Goals: 12 weeks   Pt to be discharged with home plan for carry over  after discharge.    Pt to report a decrease in pad usage to 0 pads a day to demonstrate improving pelvic floor muscle controls as evidenced by decreased episodes of incontinence needed to improve confidence in social situations.  Pt to report an 80% reduction of urinary incontinence symptoms with ADL participation thereby demonstrating improved pelvic floor muscle control and strength.   Pt to demonstrate an improved score in the FOTO Urinary Problem survey  to at least 62 to demonstrate improving pelvic floor muscle(s) function with adls.    Pt to be able to delay the urge to urinate at least 10 minutes with a strong urge to urinate in order to make it to the bathroom without leaking.    Plan     Plan of care Certification: 10/3/2022 to 12-25-22.     Outpatient Physical Therapy 2 times weekly for 12 weeks to include the following interventions: therapeutic exercises, therapeutic activity, neuromuscular re-education, gait training, manual therapy, modalities PRN, patient/family education, dry needling, and self care/home management    Kassandra Goyal, PT

## 2022-11-11 ENCOUNTER — TELEPHONE (OUTPATIENT)
Dept: FAMILY MEDICINE | Facility: CLINIC | Age: 82
End: 2022-11-11
Payer: MEDICARE

## 2022-11-11 DIAGNOSIS — I10 ESSENTIAL HYPERTENSION: ICD-10-CM

## 2022-11-11 DIAGNOSIS — Z86.69 HISTORY OF GUILLAIN-BARRE SYNDROME: Primary | ICD-10-CM

## 2022-11-11 DIAGNOSIS — R41.3 OTHER AMNESIA: ICD-10-CM

## 2022-11-11 NOTE — TELEPHONE ENCOUNTER
Pt is coming to see you on the 16th but the labs are not attached to the scheduled lab appointment. Please place labs so they can be attached to appointment.

## 2022-11-14 ENCOUNTER — LAB VISIT (OUTPATIENT)
Dept: LAB | Facility: HOSPITAL | Age: 82
End: 2022-11-14
Attending: INTERNAL MEDICINE
Payer: MEDICARE

## 2022-11-14 DIAGNOSIS — Z86.69 HISTORY OF GUILLAIN-BARRE SYNDROME: ICD-10-CM

## 2022-11-14 DIAGNOSIS — I10 ESSENTIAL HYPERTENSION: ICD-10-CM

## 2022-11-14 DIAGNOSIS — R41.3 OTHER AMNESIA: ICD-10-CM

## 2022-11-14 LAB
ALBUMIN SERPL BCP-MCNC: 3.7 G/DL (ref 3.5–5.2)
ALP SERPL-CCNC: 66 U/L (ref 55–135)
ALT SERPL W/O P-5'-P-CCNC: 16 U/L (ref 10–44)
ANION GAP SERPL CALC-SCNC: 6 MMOL/L (ref 8–16)
AST SERPL-CCNC: 22 U/L (ref 10–40)
BASOPHILS # BLD AUTO: 0.06 K/UL (ref 0–0.2)
BASOPHILS NFR BLD: 0.9 % (ref 0–1.9)
BILIRUB SERPL-MCNC: 0.5 MG/DL (ref 0.1–1)
BUN SERPL-MCNC: 11 MG/DL (ref 8–23)
CALCIUM SERPL-MCNC: 9.6 MG/DL (ref 8.7–10.5)
CHLORIDE SERPL-SCNC: 104 MMOL/L (ref 95–110)
CHOLEST SERPL-MCNC: 219 MG/DL (ref 120–199)
CHOLEST/HDLC SERPL: 4.6 {RATIO} (ref 2–5)
CO2 SERPL-SCNC: 28 MMOL/L (ref 23–29)
CREAT SERPL-MCNC: 0.7 MG/DL (ref 0.5–1.4)
DIFFERENTIAL METHOD: ABNORMAL
EOSINOPHIL # BLD AUTO: 0.4 K/UL (ref 0–0.5)
EOSINOPHIL NFR BLD: 6.6 % (ref 0–8)
ERYTHROCYTE [DISTWIDTH] IN BLOOD BY AUTOMATED COUNT: 13.6 % (ref 11.5–14.5)
EST. GFR  (NO RACE VARIABLE): >60 ML/MIN/1.73 M^2
GLUCOSE SERPL-MCNC: 74 MG/DL (ref 70–110)
HCT VFR BLD AUTO: 43.1 % (ref 37–48.5)
HDLC SERPL-MCNC: 48 MG/DL (ref 40–75)
HDLC SERPL: 21.9 % (ref 20–50)
HGB BLD-MCNC: 13.9 G/DL (ref 12–16)
IMM GRANULOCYTES # BLD AUTO: 0.02 K/UL (ref 0–0.04)
IMM GRANULOCYTES NFR BLD AUTO: 0.3 % (ref 0–0.5)
LDLC SERPL CALC-MCNC: 155.2 MG/DL (ref 63–159)
LYMPHOCYTES # BLD AUTO: 3.3 K/UL (ref 1–4.8)
LYMPHOCYTES NFR BLD: 49.9 % (ref 18–48)
MCH RBC QN AUTO: 32 PG (ref 27–31)
MCHC RBC AUTO-ENTMCNC: 32.3 G/DL (ref 32–36)
MCV RBC AUTO: 99 FL (ref 82–98)
MONOCYTES # BLD AUTO: 0.6 K/UL (ref 0.3–1)
MONOCYTES NFR BLD: 9.4 % (ref 4–15)
NEUTROPHILS # BLD AUTO: 2.2 K/UL (ref 1.8–7.7)
NEUTROPHILS NFR BLD: 32.9 % (ref 38–73)
NONHDLC SERPL-MCNC: 171 MG/DL
NRBC BLD-RTO: 0 /100 WBC
PLATELET # BLD AUTO: 294 K/UL (ref 150–450)
PMV BLD AUTO: 10.6 FL (ref 9.2–12.9)
POTASSIUM SERPL-SCNC: 3.6 MMOL/L (ref 3.5–5.1)
PROT SERPL-MCNC: 8.8 G/DL (ref 6–8.4)
RBC # BLD AUTO: 4.34 M/UL (ref 4–5.4)
SODIUM SERPL-SCNC: 138 MMOL/L (ref 136–145)
TRIGL SERPL-MCNC: 79 MG/DL (ref 30–150)
VIT B12 SERPL-MCNC: 732 PG/ML (ref 210–950)
WBC # BLD AUTO: 6.68 K/UL (ref 3.9–12.7)

## 2022-11-14 PROCEDURE — 80053 COMPREHEN METABOLIC PANEL: CPT | Performed by: INTERNAL MEDICINE

## 2022-11-14 PROCEDURE — 36415 COLL VENOUS BLD VENIPUNCTURE: CPT | Mod: PO | Performed by: INTERNAL MEDICINE

## 2022-11-14 PROCEDURE — 82607 VITAMIN B-12: CPT | Performed by: INTERNAL MEDICINE

## 2022-11-14 PROCEDURE — 85025 COMPLETE CBC W/AUTO DIFF WBC: CPT | Performed by: INTERNAL MEDICINE

## 2022-11-14 PROCEDURE — 80061 LIPID PANEL: CPT | Performed by: INTERNAL MEDICINE

## 2022-11-15 ENCOUNTER — CLINICAL SUPPORT (OUTPATIENT)
Dept: REHABILITATION | Facility: HOSPITAL | Age: 82
End: 2022-11-15
Attending: NURSE PRACTITIONER
Payer: MEDICARE

## 2022-11-15 DIAGNOSIS — R53.1 WEAKNESS: ICD-10-CM

## 2022-11-15 DIAGNOSIS — M62.89 PELVIC FLOOR DYSFUNCTION: Primary | ICD-10-CM

## 2022-11-15 PROCEDURE — 97110 THERAPEUTIC EXERCISES: CPT | Mod: PO

## 2022-11-15 NOTE — PROGRESS NOTES
Pelvic Health Physical Therapy   Treatment and Progress Note     Name: Kassandra Rodriguez  Clinic Number: [id number]    Therapy Diagnosis:   Encounter Diagnoses   Name Primary?    Pelvic floor dysfunction Yes    Weakness      Physician: Cindy Corral NP    Visit Date: 11/15/2022    Physician Orders: PT Eval and Treat   Medical Diagnosis from Referral: stress incontinence   Evaluation Date: 10/3/2022  Authorization Period Expiration: 07/22/2023  Plan of Care Expiration: 12-25-22  Progress Note Due: 12-15-22  Visit #/Visits authorized: 4/ 11   Cancelled Visits: 0  No Show Visits: 0  FOTO: Issued Vist #:  2/3 on 11/10/22    Time In: 802  Time Out: 900   Total Billable Time: 58  minutes    Precautions: Standard and Fall    Subjective     Pt reports: She reports that she is feeling less dizzy today.  She was tested for a UTI and is waiting for the results.  She follows up with her MD tomorrow.  She reports her bladder is starting to do better.  She is only leaking urine when she is up and walking around.        She was compliant with home exercise program.  Response to previous treatment: no issues reported  Functional change: no changes reported     Pain: 0/10  Location: NA    Constitutional Symptoms Review: The patient denies having any constitutional symptoms.     Objective   Pt verbally consents to intravaginal treatment today.  Signed consent form already on file.     See EMR under MEDIA for written consent provided 11/15/2022  Chaperone: declined      VAGINAL PELVIC FLOOR EXAM    EXTERNAL ASSESSMENT  Introitus: WNL  Skin condition: WNL  Scarring: none   Sensation: WNL   Pain:  none  Voluntary contraction: visible lift and accessory muscle use  Voluntary relaxation: visible drop  Involuntary contraction: bulge  Bearing down: visible drop  Perineal descent: present        INTERNAL ASSESSMENT  Pain: none   Sensation: able to localized pressure appropriately   Vaginal vault: roomy   Muscle Bulk: atrophy   Muscle Power:  2/5  Muscle Endurance: 3 sec  # Reps To Fatigue: 4    Fast Contractions in 10 seconds: Not tested     Quality of contraction: slow relaxation   Specificity: patient contracts: gluts, abdominal wall muscle(s) and adductors    Coordination: tends to hold breath during PFM contration   Prolapse check:not assessed  Comments: needs cues to fully relax after each rep         Kassandra received therapeutic exercises to develop  strength, endurance, and flexibility for 58 minutes including:   Hip adduction with ball squeeze 5 sec x 10 reps x 2 sets  Hip adduction with ball squeeze and bridge 5 sec x 10 reps x 2 sets  Posterior pelvic tilt 5 sec x 10 reps x 2 sets  Clamshells x 10 bilaterally not today  Reverse clamshells x 10 bilaterally not today  Abdominal bracing with kegel and bent knee fall out 5 sec x 10 reps x 2 sets   Abdominal bracing with kegel and marching 5 sec x 10 reps x 2 sets     Not performed today:  Kegel edu and practice.  Increased time spent on edu on proper technique.  Pt improves with practice and verbal cues. Not able to coordinate pelvic floor muscle(s) contraction with the breath at this time.      Kassandra received the following manual therapy techniques: to develop flexibility and extensibility for 00minutes including: soft tissue mobilization of abdominal muscle(s) in the suprapubic region to help decrease strain to pelvic structures.      Home Exercises Provided and Patient Education Provided     Education provided:   - anatomy/physiology of pelvic floor and Coordination of kegels with functional activities such as cough, laugh, sneeze, lift, etc.   Discussed progression of plan of care with patient; educated pt in activity modification; reviewed HEP with pt. Pt demonstrated and verbalized understanding of all instruction and was provided with a handout of HEP (see Patient Instructions).  -     Written Home Exercises Provided: yes.  Exercises were reviewed and Kassandra was able to demonstrate them prior to  the end of the session.  Kassandra demonstrated good  understanding of the education provided.     See EMR under Media for exercises provided 10/17/2022.    Assessment   Focused on coordination of kegels with hip and core strengthening exercises today which is needed to help with functional continence.  Patient performs exercises slowly and methodically thereby requiring increased time to complete exercises today.      Kassandra Is progressing well towards her goals.   Pt prognosis is Excellent.     Pt will continue to benefit from skilled outpatient physical therapy to address the deficits listed in the problem list box on initial evaluation, provide pt/family education and to maximize pt's level of independence in the home and community environment.     Pt's spiritual, cultural and educational needs considered and pt agreeable to plan of care and goals.     Anticipated barriers to physical therapy: none reported    Goals:   Short Term Goals: 4 weeks   Pt to report a decrease in pad usage to no more than 2 a day to demonstrate improving pelvic floor function needed for continence.  Pt to demonstrate an improved score in the FOTO Urinary Problem survey  to at least 58 to demonstrate improving pelvic floor muscle(s) function with adls.    Pt to demonstrate being able to correctly and consistently perform a kegel which is needed  to increase pelvic floor muscle coordination and strength needed for continence.  Pt to be able to delay the urge to urinate at least 5 minutes with a strong urge to urinate in order to make it to the bathroom without leaking.  Pt to voice understanding of the role that diet plays on urinary urgency.          Long Term Goals: 12 weeks   Pt to be discharged with home plan for carry over after discharge.    Pt to report a decrease in pad usage to 0 pads a day to demonstrate improving pelvic floor muscle controls as evidenced by decreased episodes of incontinence needed to improve confidence in social  situations.  Pt to report an 80% reduction of urinary incontinence symptoms with ADL participation thereby demonstrating improved pelvic floor muscle control and strength.   Pt to demonstrate an improved score in the FOTO Urinary Problem survey  to at least 62 to demonstrate improving pelvic floor muscle(s) function with adls.    Pt to be able to delay the urge to urinate at least 10 minutes with a strong urge to urinate in order to make it to the bathroom without leaking.    Plan     Plan of care Certification: 10/3/2022 to 12-25-22.     Outpatient Physical Therapy 2 times weekly for 12 weeks to include the following interventions: therapeutic exercises, therapeutic activity, neuromuscular re-education, gait training, manual therapy, modalities PRN, patient/family education, dry needling, and self care/home management    Kassandra Goyal, PT

## 2022-11-16 ENCOUNTER — TELEPHONE (OUTPATIENT)
Dept: FAMILY MEDICINE | Facility: CLINIC | Age: 82
End: 2022-11-16
Payer: MEDICARE

## 2022-11-16 ENCOUNTER — OFFICE VISIT (OUTPATIENT)
Dept: INTERNAL MEDICINE | Facility: CLINIC | Age: 82
End: 2022-11-16
Payer: MEDICARE

## 2022-11-16 VITALS
DIASTOLIC BLOOD PRESSURE: 60 MMHG | OXYGEN SATURATION: 98 % | WEIGHT: 149.94 LBS | SYSTOLIC BLOOD PRESSURE: 130 MMHG | HEIGHT: 61 IN | HEART RATE: 107 BPM | BODY MASS INDEX: 28.31 KG/M2

## 2022-11-16 DIAGNOSIS — Z23 NEED FOR VACCINATION: ICD-10-CM

## 2022-11-16 DIAGNOSIS — J30.1 SEASONAL ALLERGIC RHINITIS DUE TO POLLEN: ICD-10-CM

## 2022-11-16 DIAGNOSIS — I10 ESSENTIAL HYPERTENSION: Primary | ICD-10-CM

## 2022-11-16 PROCEDURE — 3075F PR MOST RECENT SYSTOLIC BLOOD PRESS GE 130-139MM HG: ICD-10-PCS | Mod: CPTII,S$GLB,, | Performed by: INTERNAL MEDICINE

## 2022-11-16 PROCEDURE — 1125F AMNT PAIN NOTED PAIN PRSNT: CPT | Mod: CPTII,S$GLB,, | Performed by: INTERNAL MEDICINE

## 2022-11-16 PROCEDURE — 1101F PR PT FALLS ASSESS DOC 0-1 FALLS W/OUT INJ PAST YR: ICD-10-PCS | Mod: CPTII,S$GLB,, | Performed by: INTERNAL MEDICINE

## 2022-11-16 PROCEDURE — 3288F PR FALLS RISK ASSESSMENT DOCUMENTED: ICD-10-PCS | Mod: CPTII,S$GLB,, | Performed by: INTERNAL MEDICINE

## 2022-11-16 PROCEDURE — 1125F PR PAIN SEVERITY QUANTIFIED, PAIN PRESENT: ICD-10-PCS | Mod: CPTII,S$GLB,, | Performed by: INTERNAL MEDICINE

## 2022-11-16 PROCEDURE — 1101F PT FALLS ASSESS-DOCD LE1/YR: CPT | Mod: CPTII,S$GLB,, | Performed by: INTERNAL MEDICINE

## 2022-11-16 PROCEDURE — 3288F FALL RISK ASSESSMENT DOCD: CPT | Mod: CPTII,S$GLB,, | Performed by: INTERNAL MEDICINE

## 2022-11-16 PROCEDURE — 1159F MED LIST DOCD IN RCRD: CPT | Mod: CPTII,S$GLB,, | Performed by: INTERNAL MEDICINE

## 2022-11-16 PROCEDURE — 1159F PR MEDICATION LIST DOCUMENTED IN MEDICAL RECORD: ICD-10-PCS | Mod: CPTII,S$GLB,, | Performed by: INTERNAL MEDICINE

## 2022-11-16 PROCEDURE — 3078F DIAST BP <80 MM HG: CPT | Mod: CPTII,S$GLB,, | Performed by: INTERNAL MEDICINE

## 2022-11-16 PROCEDURE — 3075F SYST BP GE 130 - 139MM HG: CPT | Mod: CPTII,S$GLB,, | Performed by: INTERNAL MEDICINE

## 2022-11-16 PROCEDURE — 1160F RVW MEDS BY RX/DR IN RCRD: CPT | Mod: CPTII,S$GLB,, | Performed by: INTERNAL MEDICINE

## 2022-11-16 PROCEDURE — 3078F PR MOST RECENT DIASTOLIC BLOOD PRESSURE < 80 MM HG: ICD-10-PCS | Mod: CPTII,S$GLB,, | Performed by: INTERNAL MEDICINE

## 2022-11-16 PROCEDURE — 99999 PR PBB SHADOW E&M-EST. PATIENT-LVL III: CPT | Mod: PBBFAC,,, | Performed by: INTERNAL MEDICINE

## 2022-11-16 PROCEDURE — 99999 PR PBB SHADOW E&M-EST. PATIENT-LVL III: ICD-10-PCS | Mod: PBBFAC,,, | Performed by: INTERNAL MEDICINE

## 2022-11-16 PROCEDURE — 99214 OFFICE O/P EST MOD 30 MIN: CPT | Mod: S$GLB,,, | Performed by: INTERNAL MEDICINE

## 2022-11-16 PROCEDURE — 1160F PR REVIEW ALL MEDS BY PRESCRIBER/CLIN PHARMACIST DOCUMENTED: ICD-10-PCS | Mod: CPTII,S$GLB,, | Performed by: INTERNAL MEDICINE

## 2022-11-16 PROCEDURE — 99214 PR OFFICE/OUTPT VISIT, EST, LEVL IV, 30-39 MIN: ICD-10-PCS | Mod: S$GLB,,, | Performed by: INTERNAL MEDICINE

## 2022-11-16 RX ORDER — CIPROFLOXACIN AND DEXAMETHASONE 3; 1 MG/ML; MG/ML
4 SUSPENSION/ DROPS AURICULAR (OTIC) 2 TIMES DAILY
Qty: 7.5 ML | Refills: 0 | Status: SHIPPED | OUTPATIENT
Start: 2022-11-16 | End: 2023-01-03

## 2022-11-16 RX ORDER — LEVOCETIRIZINE DIHYDROCHLORIDE 5 MG/1
5 TABLET, FILM COATED ORAL NIGHTLY
Qty: 30 TABLET | Refills: 11 | Status: SHIPPED | OUTPATIENT
Start: 2022-11-16 | End: 2023-11-16

## 2022-11-16 RX ORDER — FLUTICASONE PROPIONATE 50 MCG
1 SPRAY, SUSPENSION (ML) NASAL DAILY
Qty: 15.8 ML | Refills: 0 | Status: SHIPPED | OUTPATIENT
Start: 2022-11-16

## 2022-11-16 RX ORDER — MONTELUKAST SODIUM 10 MG/1
10 TABLET ORAL NIGHTLY
Qty: 30 TABLET | Refills: 0 | Status: SHIPPED | OUTPATIENT
Start: 2022-11-16 | End: 2022-12-16

## 2022-11-16 NOTE — TELEPHONE ENCOUNTER
----- Message from Reny Contreras Patient Care Assistant sent at 11/16/2022  3:23 PM CST -----  .Type:  Pharmacy Calling to Clarify an RX    Name of Caller: Akila  Pharmacy Name: [facility]  Prescription Name: ciprofloxacin-dexAMETHasone 0.3-0.1% (CIPRODEX) 0.3-0.1 % DrpS  What do they need to clarify?: would like to change medication to something that costless because the other medication is too expensive   Best Call Back Number:[phone]  Additional Information:

## 2022-11-16 NOTE — PROGRESS NOTES
"Assessment:       1. Essential hypertension    2. Need for vaccination    3. Seasonal allergic rhinitis due to pollen          Plan:         Kassandra BOBO was seen today for follow-up, hypertension, otalgia and nasal congestion.    Diagnoses and all orders for this visit:    Essential hypertension  Chronic  Controlled  Patient is at goal today   I have reviewed lifestyle modification to achieve/maintain goals  We will continue the current medication regimen as listed below  Patient will follow up in 6 months     -     Basic Metabolic Panel; Standing    Need for vaccination    Seasonal allergic rhinitis due to pollen  -     fluticasone propionate (FLONASE) 50 mcg/actuation nasal spray; 1 spray (50 mcg total) by Each Nostril route once daily.  -     levocetirizine (XYZAL) 5 MG tablet; Take 1 tablet (5 mg total) by mouth every evening.  -     montelukast (SINGULAIR) 10 mg tablet; Take 1 tablet (10 mg total) by mouth every evening.  -     ciprofloxacin-dexAMETHasone 0.3-0.1% (CIPRODEX) 0.3-0.1 % DrpS; Place 4 drops into both ears 2 (two) times daily.        Subjective:       Patient ID: Kassandra Rodriguez is a 81 y.o. female.    Chief Complaint: Follow-up, Hypertension, Otalgia, and Nasal Congestion    Interim hx    Urology  seen in October,     Hypertension  This is a chronic problem. The current episode started more than 1 year ago. The problem has been waxing and waning since onset. The problem is controlled. Past treatments include calcium channel blockers. The current treatment provides significant improvement. There are no compliance problems.    Otalgia       Review of Systems   HENT:  Positive for ear pain.            Health Maintenance Due   Topic Date Due    Influenza Vaccine (1) 09/01/2022     Done a the drug store     Objective:     /60 (BP Location: Right arm, Patient Position: Sitting, BP Method: Medium (Manual))   Pulse 107   Ht 5' 1" (1.549 m)   Wt 68 kg (149 lb 14.6 oz)   LMP  (LMP Unknown)   SpO2 98%   " BMI 28.33 kg/m²     Vitals 11/16/2022 10/19/2022 7/22/2022 5/16/2022 3/7/2022   Height 61 - 61 61 61   Weight (lbs) 149.91 150 148 153.88 152.12   BMI (kg/m2) 28.3 - 28 29.1 28.8            Physical Exam  Nursing note reviewed.   Constitutional:       General: She is not in acute distress.     Appearance: Normal appearance. She is not ill-appearing, toxic-appearing or diaphoretic.   HENT:      Head: Normocephalic.      Ears:      Comments: Fuild behind TM bilaterally   Eyes:      Conjunctiva/sclera: Conjunctivae normal.   Pulmonary:      Effort: Pulmonary effort is normal. No respiratory distress.   Neurological:      General: No focal deficit present.      Mental Status: She is alert and oriented to person, place, and time.   Psychiatric:         Mood and Affect: Mood normal.         Behavior: Behavior normal.         Thought Content: Thought content normal.         Judgment: Judgment normal.             ASCVD  The ASCVD Risk score (Brennan DK, et al., 2019) failed to calculate for the following reasons:    The 2019 ASCVD risk score is only valid for ages 40 to 79    Basic Labs    BMP  Lab Results   Component Value Date     11/14/2022    K 3.6 11/14/2022     11/14/2022    CO2 28 11/14/2022    BUN 11 11/14/2022    CREATININE 0.7 11/14/2022    CALCIUM 9.6 11/14/2022    ANIONGAP 6 (L) 11/14/2022    ESTGFRAFRICA >60.0 08/12/2021    EGFRNONAA >60.0 08/12/2021     Lab Results   Component Value Date    EGFRNORACEVR >60.0 11/14/2022       Lab Results   Component Value Date    ALT 16 11/14/2022    AST 22 11/14/2022    ALKPHOS 66 11/14/2022    BILITOT 0.5 11/14/2022         Lab Results   Component Value Date    TSH 1.050 02/11/2021     Lab Results   Component Value Date    WBC 6.68 11/14/2022    HGB 13.9 11/14/2022    HCT 43.1 11/14/2022    MCV 99 (H) 11/14/2022     11/14/2022           Lipids  Lab Results   Component Value Date    CHOL 219 (H) 11/14/2022     Lab Results   Component Value Date    HDL 48  11/14/2022     Lab Results   Component Value Date    LDLCALC 155.2 11/14/2022     Lab Results   Component Value Date    TRIG 79 11/14/2022     Lab Results   Component Value Date    CHOLHDL 21.9 11/14/2022         Future Appointments   Date Time Provider Department Center   11/28/2022 10:00 AM Kassandra Marques Jyotsna, PT [facility]   12/5/2022 10:00 AM Sherita Jyotsna, PT [facility]   12/12/2022 10:00 AM Kassandra Marques Jyotsna, PT [facility]         Medication List with Changes/Refills   New Medications    CIPROFLOXACIN-DEXAMETHASONE 0.3-0.1% (CIPRODEX) 0.3-0.1 % DRPS    Place 4 drops into both ears 2 (two) times daily.    LEVOCETIRIZINE (XYZAL) 5 MG TABLET    Take 1 tablet (5 mg total) by mouth every evening.    MONTELUKAST (SINGULAIR) 10 MG TABLET    Take 1 tablet (10 mg total) by mouth every evening.   Current Medications    AMLODIPINE (NORVASC) 5 MG TABLET    Take 1 tablet (5 mg total) by mouth once daily.    ASPIRIN (ECOTRIN) 81 MG EC TABLET    Take 81 mg by mouth once daily.    CALCIUM-VITAMIN D 250-100 MG-UNIT PER TABLET    Take 1 tablet by mouth 2 (two) times daily.    CLOBETASOL 0.05% (TEMOVATE) 0.05 % OINT    Apply 1-2 times daily to top of vaginal lips/vulva area    CONJUGATED ESTROGENS (PREMARIN) VAGINAL CREAM    Apply 1 applicator nightly vaginally  x 2 weeks then nightly 3x per week thereafter    MIRABEGRON (MYRBETRIQ) 25 MG TB24 ER TABLET    Take 1 tablet (25 mg total) by mouth once daily.    MULTIVIT-MINS NO.63/IRON/FOLIC (M-VIT ORAL)    Take 1 tablet by mouth.    TURMERIC ROOT EXTRACT ORAL    Take by mouth.   Changed and/or Refilled Medications    Modified Medication Previous Medication    FLUTICASONE PROPIONATE (FLONASE) 50 MCG/ACTUATION NASAL SPRAY fluticasone propionate (FLONASE) 50 mcg/actuation nasal spray       1 spray (50 mcg total) by Each Nostril route once daily.    1 spray (50 mcg total) by Each Nostril route once daily.         Disclaimer:   This note has been generated using voice-recognition software. There may be grammatical or spelling errors that have been missed during proof-reading

## 2022-11-28 ENCOUNTER — CLINICAL SUPPORT (OUTPATIENT)
Dept: REHABILITATION | Facility: HOSPITAL | Age: 82
End: 2022-11-28
Attending: NURSE PRACTITIONER
Payer: MEDICARE

## 2022-11-28 DIAGNOSIS — M62.89 PELVIC FLOOR DYSFUNCTION: Primary | ICD-10-CM

## 2022-11-28 DIAGNOSIS — R53.1 WEAKNESS: ICD-10-CM

## 2022-11-28 PROCEDURE — 97110 THERAPEUTIC EXERCISES: CPT | Mod: KX,PO

## 2022-11-28 NOTE — PROGRESS NOTES
"  Pelvic Health Physical Therapy   Treatment  Note     Name: Kassandra Rodriguez  Clinic Number: [id number]    Therapy Diagnosis:   Encounter Diagnoses   Name Primary?    Pelvic floor dysfunction Yes    Weakness      Physician: Cindy Corral NP    Visit Date: 11/28/2022    Physician Orders: PT Eval and Treat   Medical Diagnosis from Referral: stress incontinence   Evaluation Date: 10/3/2022  Authorization Period Expiration: 07/22/2023  Plan of Care Expiration: 12-25-22  Progress Note Due: 12-15-22  Visit #/Visits authorized: 4/ 11   Cancelled Visits: 0  No Show Visits: 0  FOTO: Issued Vist #:  [id number] on 11/10/22    Time In: 812 (therapist running late)  Time Out: 1158  Total Billable Time: 46  minutes    Precautions: Standard and Fall    Subjective     Pt reports: She reports "it's been doing a lot better. At night I don't have to get up to go to the bathroom."  She reports her dizziness is better which has helped.        She was compliant with home exercise program.  Response to previous treatment: no issues reported  Functional change: no changes reported     Pain: 0/10  Location: NA    Constitutional Symptoms Review: The patient denies having any constitutional symptoms.     Objective   Pt verbally consents to intravaginal treatment today.  Signed consent form already on file.     See EMR under MEDIA for written consent provided 11/28/2022  Chaperone: declined      VAGINAL PELVIC FLOOR EXAM    EXTERNAL ASSESSMENT  Introitus: WNL  Skin condition: WNL  Scarring: none   Sensation: WNL   Pain:  none  Voluntary contraction: visible lift and accessory muscle use  Voluntary relaxation: visible drop  Involuntary contraction: bulge  Bearing down: visible drop  Perineal descent: present        INTERNAL ASSESSMENT  Pain: none   Sensation: able to localized pressure appropriately   Vaginal vault: roomy   Muscle Bulk: atrophy   Muscle Power: 2/5  Muscle Endurance: 3 sec  # Reps To Fatigue: 4    Fast Contractions in 10 seconds: Not " tested     Quality of contraction: slow relaxation   Specificity: patient contracts: gluts, abdominal wall muscle(s) and adductors    Coordination: tends to hold breath during PFM contration   Prolapse check:not assessed  Comments: needs cues to fully relax after each rep         Kassandra received therapeutic exercises to develop  strength, endurance, and flexibility for 46 minutes including:   Hip adduction with ball squeeze 5 sec x 10 reps x 4 sets  Hip adduction with ball squeeze and bridge 5 sec x 10 reps x 2 sets  Posterior pelvic tilt 5 sec x 10 reps x 2 sets  Clamshells 5 sec x 10 bilaterally   Reverse clamshells 5 sec  x 10 bilaterally   Abdominal bracing with kegel and bent knee fall out 5 sec x 10 reps x 2 sets Not today  Abdominal bracing with kegel and marching 5 sec x 10 reps x 2 sets not today       Kegel edu and practice.  Increased time spent on edu on proper technique.  Pt improves with practice and verbal cues. Not able to coordinate pelvic floor muscle(s) contraction with the breath at this time.      Kassandra received the following manual therapy techniques: to develop flexibility and extensibility for 00minutes including: soft tissue mobilization of abdominal muscle(s) in the suprapubic region to help decrease strain to pelvic structures.      Home Exercises Provided and Patient Education Provided     Education provided:   - anatomy/physiology of pelvic floor and Coordination of kegels with functional activities such as cough, laugh, sneeze, lift, etc.   Discussed progression of plan of care with patient; educated pt in activity modification; reviewed HEP with pt. Pt demonstrated and verbalized understanding of all instruction and was provided with a handout of HEP (see Patient Instructions).  -     Written Home Exercises Provided: yes.  Exercises were reviewed and Kassandra was able to demonstrate them prior to the end of the session.  Kassandra demonstrated good  understanding of the education provided.     See  EMR under Media for exercises provided 10/17/2022.    Assessment   Improved ability to perform kegels noted.  Required cueing to activate layer 3 more fully but otherwise doing much better.  Focused on coordination of kegels with hip and core strengthening exercises today which is needed to help with functional continence.  Patient performs exercises slowly and methodically thereby requiring increased time to complete exercises today.      Kassandra Is progressing well towards her goals.   Pt prognosis is Excellent.     Pt will continue to benefit from skilled outpatient physical therapy to address the deficits listed in the problem list box on initial evaluation, provide pt/family education and to maximize pt's level of independence in the home and community environment.     Pt's spiritual, cultural and educational needs considered and pt agreeable to plan of care and goals.     Anticipated barriers to physical therapy: none reported    Goals:   Short Term Goals: 4 weeks   Pt to report a decrease in pad usage to no more than 2 a day to demonstrate improving pelvic floor function needed for continence.  Pt to demonstrate an improved score in the FOTO Urinary Problem survey  to at least 58 to demonstrate improving pelvic floor muscle(s) function with adls.    Pt to demonstrate being able to correctly and consistently perform a kegel which is needed  to increase pelvic floor muscle coordination and strength needed for continence.  Pt to be able to delay the urge to urinate at least 5 minutes with a strong urge to urinate in order to make it to the bathroom without leaking.  Pt to voice understanding of the role that diet plays on urinary urgency.          Long Term Goals: 12 weeks   Pt to be discharged with home plan for carry over after discharge.    Pt to report a decrease in pad usage to 0 pads a day to demonstrate improving pelvic floor muscle controls as evidenced by decreased episodes of incontinence needed to  improve confidence in social situations.  Pt to report an 80% reduction of urinary incontinence symptoms with ADL participation thereby demonstrating improved pelvic floor muscle control and strength.   Pt to demonstrate an improved score in the FOTO Urinary Problem survey  to at least 62 to demonstrate improving pelvic floor muscle(s) function with adls.    Pt to be able to delay the urge to urinate at least 10 minutes with a strong urge to urinate in order to make it to the bathroom without leaking.    Plan     Plan of care Certification: 10/3/2022 to 12-25-22.     Outpatient Physical Therapy 2 times weekly for 12 weeks to include the following interventions: therapeutic exercises, therapeutic activity, neuromuscular re-education, gait training, manual therapy, modalities PRN, patient/family education, dry needling, and self care/home management    Kassandra Goyal, PT

## 2022-12-12 ENCOUNTER — CLINICAL SUPPORT (OUTPATIENT)
Dept: REHABILITATION | Facility: HOSPITAL | Age: 82
End: 2022-12-12
Payer: MEDICARE

## 2022-12-12 DIAGNOSIS — R53.1 WEAKNESS: ICD-10-CM

## 2022-12-12 DIAGNOSIS — M62.89 PELVIC FLOOR DYSFUNCTION: Primary | ICD-10-CM

## 2022-12-12 NOTE — PROGRESS NOTES
"  Pelvic Health Physical Therapy   Treatment  and Discharge Note     Name: Kassandra Rodriguez  Clinic Number: [id number]    Therapy Diagnosis:   Encounter Diagnoses   Name Primary?    Pelvic floor dysfunction Yes    Weakness      Physician: Cindy Corral NP    Visit Date: 12/12/2022    Physician Orders: PT Eval and Treat   Medical Diagnosis from Referral: stress incontinence   Evaluation Date: 10/3/2022  Authorization Period Expiration: 07/22/2023  Plan of Care Expiration: 12-25-22  Progress Note Due: 12-15-22  Visit #/Visits authorized: 6/ 11   Cancelled Visits: 0  No Show Visits: 0  FOTO: Issued Vist #:  3/3     Time In: 1000  Time Out: 1040  Total Billable Time: 40  minutes    Precautions: Standard and Fall    Subjective     Pt reports: "I'm doing a lot better.  At night it does not bother me at all.  It's not bad.  I can handle it now.  It's not running down my legs anymore."        She was compliant with home exercise program.  Response to previous treatment: no issues reported  Functional change: no changes reported     Pain: 0/10  Location: NA    Constitutional Symptoms Review: The patient denies having any constitutional symptoms.     Objective   Pt verbally consents to intravaginal treatment today.  Signed consent form already on file.     See EMR under MEDIA for written consent provided 12/12/2022  Chaperone: declined      VAGINAL PELVIC FLOOR EXAM    EXTERNAL ASSESSMENT  Introitus: WNL  Skin condition: WNL  Scarring: none   Sensation: WNL   Pain:  none  Voluntary contraction: visible lift and accessory muscle use  Voluntary relaxation: visible drop  Involuntary contraction: bulge  Bearing down: visible drop  Perineal descent: present        INTERNAL ASSESSMENT  Pain: none   Sensation: able to localized pressure appropriately   Vaginal vault: roomy   Muscle Bulk: atrophy   Muscle Power: 2/5  Muscle Endurance: 3 sec  # Reps To Fatigue: 4    Fast Contractions in 10 seconds: Not tested     Quality of contraction: " slow relaxation   Specificity: patient contracts: gluts, abdominal wall muscle(s) and adductors    Coordination: tends to hold breath during PFM contration   Prolapse check:not assessed  Comments: needs cues to fully relax after each rep       Therapeutic Activity to improve dynamic functional performance, coordination and education for 30 minutes. Including: plan of care review, discharge planning and home exercise program review for discharge    Home Exercises Provided and Patient Education Provided     Education provided:   - anatomy/physiology of pelvic floor and Coordination of kegels with functional activities such as cough, laugh, sneeze, lift, etc.   Discussed progression of plan of care with patient; educated pt in activity modification; reviewed HEP with pt. Pt demonstrated and verbalized understanding of all instruction and was provided with a handout of HEP (see Patient Instructions).  -     Written Home Exercises Provided: yes.  Exercises were reviewed and Kassandra was able to demonstrate them prior to the end of the session.  Kassandra demonstrated good  understanding of the education provided.     See EMR under Media for exercises provided 10/17/2022.    Assessment   Kassandra has made excellent progress with participation in the POC towards of her urinary incontinence with activities of daily living.  She has met the majority of her goals and is independent with her home program.  At this time Kassandra no longer requires skilled intervention and is appropriate for discharge.         Goals:   Short Term Goals: 4 weeks   Pt to report a decrease in pad usage to no more than 2 a day to demonstrate improving pelvic floor function needed for continence. MET  Pt to demonstrate an improved score in the FOTO Urinary Problem survey  to at least 58 to demonstrate improving pelvic floor muscle(s) function with adls.  NOT MET  Pt to demonstrate being able to correctly and consistently perform a kegel which is needed  to increase  pelvic floor muscle coordination and strength needed for continence. MET  Pt to be able to delay the urge to urinate at least 5 minutes with a strong urge to urinate in order to make it to the bathroom without leaking. MET  Pt to voice understanding of the role that diet plays on urinary urgency.  MET        Long Term Goals: 12 weeks   Pt to be discharged with home plan for carry over after discharge.  MET  Pt to report a decrease in pad usage to 0 pads a day to demonstrate improving pelvic floor muscle controls as evidenced by decreased episodes of incontinence needed to improve confidence in social situations. NOT MET  Pt to report an 80% reduction of urinary incontinence symptoms with ADL participation thereby demonstrating improved pelvic floor muscle control and strength. MET  Pt to demonstrate an improved score in the FOTO Urinary Problem survey  to at least 62 to demonstrate improving pelvic floor muscle(s) function with adls.  NOT MET  Pt to be able to delay the urge to urinate at least 10 minutes with a strong urge to urinate in order to make it to the bathroom without leaking.MET    Plan     Discharge from physical therapy     Kassandra Goyal, PT

## 2022-12-13 ENCOUNTER — PES CALL (OUTPATIENT)
Dept: ADMINISTRATIVE | Facility: CLINIC | Age: 82
End: 2022-12-13
Payer: MEDICARE

## 2022-12-30 ENCOUNTER — TELEPHONE (OUTPATIENT)
Dept: ADMINISTRATIVE | Facility: CLINIC | Age: 82
End: 2022-12-30
Payer: MEDICARE

## 2022-12-30 NOTE — TELEPHONE ENCOUNTER
Called pt, no answer; left message informing pt I was calling to remind pt of her in office EAWV on 1/3/23 and to return my call if she had any questions; left my name and number

## 2023-01-03 ENCOUNTER — OFFICE VISIT (OUTPATIENT)
Dept: FAMILY MEDICINE | Facility: CLINIC | Age: 83
End: 2023-01-03
Payer: MEDICARE

## 2023-01-03 VITALS
OXYGEN SATURATION: 98 % | DIASTOLIC BLOOD PRESSURE: 70 MMHG | HEART RATE: 75 BPM | BODY MASS INDEX: 28.05 KG/M2 | SYSTOLIC BLOOD PRESSURE: 110 MMHG | HEIGHT: 61 IN | WEIGHT: 148.56 LBS

## 2023-01-03 DIAGNOSIS — I10 ESSENTIAL HYPERTENSION: ICD-10-CM

## 2023-01-03 DIAGNOSIS — Z00.00 ENCOUNTER FOR PREVENTIVE HEALTH EXAMINATION: Primary | ICD-10-CM

## 2023-01-03 DIAGNOSIS — H90.A22 SENSORINEURAL HEARING LOSS (SNHL) OF LEFT EAR WITH RESTRICTED HEARING OF RIGHT EAR: ICD-10-CM

## 2023-01-03 PROCEDURE — 1126F AMNT PAIN NOTED NONE PRSNT: CPT | Mod: CPTII,S$GLB,,

## 2023-01-03 PROCEDURE — G0439 PPPS, SUBSEQ VISIT: HCPCS | Mod: S$GLB,,,

## 2023-01-03 PROCEDURE — G0439 PR MEDICARE ANNUAL WELLNESS SUBSEQUENT VISIT: ICD-10-PCS | Mod: S$GLB,,,

## 2023-01-03 PROCEDURE — 1160F RVW MEDS BY RX/DR IN RCRD: CPT | Mod: CPTII,S$GLB,,

## 2023-01-03 PROCEDURE — 3074F SYST BP LT 130 MM HG: CPT | Mod: CPTII,S$GLB,,

## 2023-01-03 PROCEDURE — 99999 PR PBB SHADOW E&M-EST. PATIENT-LVL V: ICD-10-PCS | Mod: PBBFAC,,,

## 2023-01-03 PROCEDURE — 3074F PR MOST RECENT SYSTOLIC BLOOD PRESSURE < 130 MM HG: ICD-10-PCS | Mod: CPTII,S$GLB,,

## 2023-01-03 PROCEDURE — 1101F PT FALLS ASSESS-DOCD LE1/YR: CPT | Mod: CPTII,S$GLB,,

## 2023-01-03 PROCEDURE — 1126F PR PAIN SEVERITY QUANTIFIED, NO PAIN PRESENT: ICD-10-PCS | Mod: CPTII,S$GLB,,

## 2023-01-03 PROCEDURE — 1170F FXNL STATUS ASSESSED: CPT | Mod: CPTII,S$GLB,,

## 2023-01-03 PROCEDURE — 1160F PR REVIEW ALL MEDS BY PRESCRIBER/CLIN PHARMACIST DOCUMENTED: ICD-10-PCS | Mod: CPTII,S$GLB,,

## 2023-01-03 PROCEDURE — 3078F DIAST BP <80 MM HG: CPT | Mod: CPTII,S$GLB,,

## 2023-01-03 PROCEDURE — 1101F PR PT FALLS ASSESS DOC 0-1 FALLS W/OUT INJ PAST YR: ICD-10-PCS | Mod: CPTII,S$GLB,,

## 2023-01-03 PROCEDURE — 99999 PR PBB SHADOW E&M-EST. PATIENT-LVL V: CPT | Mod: PBBFAC,,,

## 2023-01-03 PROCEDURE — 3078F PR MOST RECENT DIASTOLIC BLOOD PRESSURE < 80 MM HG: ICD-10-PCS | Mod: CPTII,S$GLB,,

## 2023-01-03 PROCEDURE — 3288F FALL RISK ASSESSMENT DOCD: CPT | Mod: CPTII,S$GLB,,

## 2023-01-03 PROCEDURE — 1170F PR FUNCTIONAL STATUS ASSESSED: ICD-10-PCS | Mod: CPTII,S$GLB,,

## 2023-01-03 PROCEDURE — 3288F PR FALLS RISK ASSESSMENT DOCUMENTED: ICD-10-PCS | Mod: CPTII,S$GLB,,

## 2023-01-03 PROCEDURE — 1159F MED LIST DOCD IN RCRD: CPT | Mod: CPTII,S$GLB,,

## 2023-01-03 PROCEDURE — 1159F PR MEDICATION LIST DOCUMENTED IN MEDICAL RECORD: ICD-10-PCS | Mod: CPTII,S$GLB,,

## 2023-01-03 RX ORDER — ACETAMINOPHEN 500 MG
500 TABLET ORAL EVERY 6 HOURS PRN
COMMUNITY

## 2023-01-03 RX ORDER — CHOLECALCIFEROL (VITAMIN D3) 25 MCG
1000 TABLET ORAL DAILY
COMMUNITY

## 2023-01-03 NOTE — PROGRESS NOTES
Talat Rodriguez presented for a  Medicare AWV and comprehensive Health Risk Assessment today. The following components were reviewed and updated:    Medical history  Family History  Social history  Allergies and Current Medications  Health Risk Assessment  Health Maintenance  Care Team         ** See Completed Assessments for Annual Wellness Visit within the encounter summary.**         The following assessments were completed:  Living Situation  CAGE  Depression Screening  Timed Get Up and Go  Whisper Test  Cognitive Function Screening      Nutrition Screening  ADL Screening  PAQ Screening      Review for Opioid Screening: Pt does not have Rx for Opioids      Review for Substance Use Disorders: Patient does not use substance        Current Outpatient Medications:     acetaminophen (TYLENOL) 500 MG tablet, Take 500 mg by mouth every 6 (six) hours as needed for Pain., Disp: , Rfl:     amLODIPine (NORVASC) 5 MG tablet, Take 1 tablet (5 mg total) by mouth once daily., Disp: 90 tablet, Rfl: 1    aspirin (ECOTRIN) 81 MG EC tablet, Take 81 mg by mouth once daily., Disp: , Rfl:     fluticasone propionate (FLONASE) 50 mcg/actuation nasal spray, 1 spray (50 mcg total) by Each Nostril route once daily., Disp: 15.8 mL, Rfl: 0    levocetirizine (XYZAL) 5 MG tablet, Take 1 tablet (5 mg total) by mouth every evening., Disp: 30 tablet, Rfl: 11    multivit-mins no.63/iron/folic (M-VIT ORAL), Take 1 tablet by mouth., Disp: , Rfl:     vitamin D (VITAMIN D3) 1000 units Tab, Take 1,000 Units by mouth once daily., Disp: , Rfl:     calcium-vitamin D 250-100 mg-unit per tablet, Take 1 tablet by mouth 2 (two) times daily., Disp: , Rfl:     clobetasol 0.05% (TEMOVATE) 0.05 % Oint, Apply 1-2 times daily to top of vaginal lips/vulva area (Patient not taking: Reported on 1/3/2023), Disp: 30 g, Rfl: 0    conjugated estrogens (PREMARIN) vaginal cream, Apply 1 applicator nightly vaginally  x 2 weeks then nightly 3x per week thereafter (Patient  "not taking: Reported on 1/3/2023), Disp: 30 g, Rfl: 5    mirabegron (MYRBETRIQ) 25 mg Tb24 ER tablet, Take 1 tablet (25 mg total) by mouth once daily. (Patient not taking: Reported on 11/16/2022), Disp: 30 tablet, Rfl: 11    TURMERIC ROOT EXTRACT ORAL, Take by mouth., Disp: , Rfl:        Vitals:    01/03/23 0814   BP: 110/70   Pulse: 75   SpO2: 98%   Weight: 67.4 kg (148 lb 9.4 oz)   Height: 5' 1" (1.549 m)   PainSc: 0-No pain      Physical Exam  Constitutional:       General: She is not in acute distress.     Appearance: Normal appearance. She is well-developed and well-groomed. She is not ill-appearing or toxic-appearing.   Cardiovascular:      Rate and Rhythm: Normal rate and regular rhythm.      Pulses: Normal pulses.      Heart sounds: Normal heart sounds. No murmur heard.  Pulmonary:      Effort: Pulmonary effort is normal. No respiratory distress.      Breath sounds: Normal breath sounds.   Abdominal:      General: Bowel sounds are normal. There is no distension.      Palpations: Abdomen is soft.      Tenderness: There is no abdominal tenderness. There is no guarding.   Musculoskeletal:         General: Normal range of motion.      Cervical back: Normal range of motion and neck supple.      Right lower leg: No edema.      Left lower leg: No edema.   Lymphadenopathy:      Cervical: No cervical adenopathy.   Skin:     General: Skin is warm and dry.   Neurological:      General: No focal deficit present.      Mental Status: She is alert and oriented to person, place, and time.   Psychiatric:         Attention and Perception: Attention normal.         Mood and Affect: Mood normal.         Speech: Speech normal.         Behavior: Behavior is cooperative.           Diagnoses and health risks identified today and associated recommendations/orders:    1. Encounter for preventive health examination      2. Essential hypertension  Chronic; stable on medications. Followed by PCP.     3. Sensorineural hearing loss (SNHL) " of left ear with restricted hearing of right ear  Amb referral to ENT and Audiology placed in chart. Care coordinator to schedule appointment for patient today.       Provided Kassandra BOBO with a 5-10 year written screening schedule and personal prevention plan. Recommendations were developed using the USPSTF age appropriate recommendations. Education, counseling, and referrals were provided as needed. After Visit Summary printed and given to patient which includes a list of additional screenings\tests needed.    Follow up in about 1 year (around 1/3/2024).    Amena Solares, NP    Advance Care Planning   I offered to discuss advanced care planning, including how to pick a person who would make decisions for you if you were unable to make them for yourself, called a health care power of , and what kind of decisions you might make such as use of life sustaining treatments such as ventilators and tube feeding when faced with a life limiting illness recorded on a living will that they will need to know. (How you want to be cared for as you near the end of your natural life)     X Patient is interested in learning more about how to make advanced directives.  I provided them paperwork and offered to discuss this with them.

## 2023-01-03 NOTE — PATIENT INSTRUCTIONS
Counseling and Referral of Other Preventative  (Italic type indicates deductible and co-insurance are waived)    Patient Name: Kassandra Rodriguez  Today's Date: 1/3/2023    Health Maintenance       Date Due Completion Date    DEXA Scan 08/14/2023 8/14/2020    TETANUS VACCINE 04/01/2027 4/1/2017    Lipid Panel 11/14/2027 11/14/2022        No orders of the defined types were placed in this encounter.    The following information is provided to all patients.  This information is to help you find resources for any of the problems found today that may be affecting your health:                Living healthy guide: www.[facility].[place].[facility]      Understanding Diabetes: www.diabetes.org      Eating healthy: www.cdc.gov/healthyweight      CDC home safety checklist: www.cdc.gov/steadi/patient.html      Agency on Aging: www.goea.[place].[facility]      Alcoholics anonymous (AA): www.aa.org      Physical Activity: www.[place].nih.gov/sr0bjxy      Tobacco use: www.quitwithusla.org

## 2023-01-26 ENCOUNTER — HOSPITAL ENCOUNTER (OUTPATIENT)
Dept: RADIOLOGY | Facility: HOSPITAL | Age: 83
Discharge: HOME | End: 2023-01-26
Attending: INTERNAL MEDICINE
Payer: MEDICARE

## 2023-01-26 ENCOUNTER — OFFICE VISIT (OUTPATIENT)
Dept: INTERNAL MEDICINE | Facility: CLINIC | Age: 83
End: 2023-01-26
Payer: MEDICARE

## 2023-01-26 VITALS
HEIGHT: 61 IN | WEIGHT: 149.25 LBS | SYSTOLIC BLOOD PRESSURE: 106 MMHG | DIASTOLIC BLOOD PRESSURE: 60 MMHG | HEART RATE: 92 BPM | BODY MASS INDEX: 28.18 KG/M2 | OXYGEN SATURATION: 97 %

## 2023-01-26 DIAGNOSIS — I70.0 AORTIC ATHEROSCLEROSIS: ICD-10-CM
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
## 2023-01-26 DIAGNOSIS — M85.80 OSTEOPENIA, UNSPECIFIED LOCATION: ICD-10-CM

## 2023-01-26 PROCEDURE — 1100F PR PT FALLS ASSESS DOC 2+ FALLS/FALL W/INJURY/YR: ICD-10-PCS | Mod: CPTII,S$GLB,, | Performed by: INTERNAL MEDICINE

## 2023-01-26 PROCEDURE — 73030 X-RAY EXAM OF SHOULDER: CPT | Mod: 26,LT,, | Performed by: RADIOLOGY

## 2023-01-26 PROCEDURE — 73100 X-RAY EXAM OF WRIST: CPT | Mod: 26,LT,, | Performed by: RADIOLOGY

## 2023-01-26 PROCEDURE — 3288F PR FALLS RISK ASSESSMENT DOCUMENTED: ICD-10-PCS | Mod: CPTII,S$GLB,, | Performed by: INTERNAL MEDICINE

## 2023-01-26 PROCEDURE — 1160F RVW MEDS BY RX/DR IN RCRD: CPT | Mod: CPTII,S$GLB,, | Performed by: INTERNAL MEDICINE

## 2023-01-26 PROCEDURE — 1159F PR MEDICATION LIST DOCUMENTED IN MEDICAL RECORD: ICD-10-PCS | Mod: CPTII,S$GLB,, | Performed by: INTERNAL MEDICINE

## 2023-01-26 PROCEDURE — 73562 X-RAY EXAM OF KNEE 3: CPT | Mod: TC,FY,RT

## 2023-01-26 PROCEDURE — 99214 PR OFFICE/OUTPT VISIT, EST, LEVL IV, 30-39 MIN: ICD-10-PCS | Mod: S$GLB,,, | Performed by: INTERNAL MEDICINE

## 2023-01-26 PROCEDURE — 73030 X-RAY EXAM OF SHOULDER: CPT | Mod: TC,FY,LT

## 2023-01-26 PROCEDURE — 73100 X-RAY EXAM OF WRIST: CPT | Mod: 26,RT,, | Performed by: RADIOLOGY

## 2023-01-26 PROCEDURE — 3288F FALL RISK ASSESSMENT DOCD: CPT | Mod: CPTII,S$GLB,, | Performed by: INTERNAL MEDICINE

## 2023-01-26 PROCEDURE — 3074F PR MOST RECENT SYSTOLIC BLOOD PRESSURE < 130 MM HG: ICD-10-PCS | Mod: CPTII,S$GLB,, | Performed by: INTERNAL MEDICINE

## 2023-01-26 PROCEDURE — 3074F SYST BP LT 130 MM HG: CPT | Mod: CPTII,S$GLB,, | Performed by: INTERNAL MEDICINE

## 2023-01-26 PROCEDURE — 73562 X-RAY EXAM OF KNEE 3: CPT | Mod: 26,RT,, | Performed by: RADIOLOGY

## 2023-01-26 PROCEDURE — 73562 XR KNEE 3 VIEW RIGHT: ICD-10-PCS | Mod: 26,RT,, | Performed by: RADIOLOGY

## 2023-01-26 PROCEDURE — 3078F PR MOST RECENT DIASTOLIC BLOOD PRESSURE < 80 MM HG: ICD-10-PCS | Mod: CPTII,S$GLB,, | Performed by: INTERNAL MEDICINE

## 2023-01-26 PROCEDURE — 1100F PTFALLS ASSESS-DOCD GE2>/YR: CPT | Mod: CPTII,S$GLB,, | Performed by: INTERNAL MEDICINE

## 2023-01-26 PROCEDURE — 73030 X-RAY EXAM OF SHOULDER: CPT | Mod: TC,FY,RT

## 2023-01-26 PROCEDURE — 1159F MED LIST DOCD IN RCRD: CPT | Mod: CPTII,S$GLB,, | Performed by: INTERNAL MEDICINE

## 2023-01-26 PROCEDURE — 3078F DIAST BP <80 MM HG: CPT | Mod: CPTII,S$GLB,, | Performed by: INTERNAL MEDICINE

## 2023-01-26 PROCEDURE — 73030 X-RAY EXAM OF SHOULDER: CPT | Mod: 26,RT,, | Performed by: RADIOLOGY

## 2023-01-26 PROCEDURE — 1125F PR PAIN SEVERITY QUANTIFIED, PAIN PRESENT: ICD-10-PCS | Mod: CPTII,S$GLB,, | Performed by: INTERNAL MEDICINE

## 2023-01-26 PROCEDURE — 73100 X-RAY EXAM OF WRIST: CPT | Mod: TC,FY,LT

## 2023-01-26 PROCEDURE — 99999 PR PBB SHADOW E&M-EST. PATIENT-LVL IV: ICD-10-PCS | Mod: PBBFAC,,, | Performed by: INTERNAL MEDICINE

## 2023-01-26 PROCEDURE — 1125F AMNT PAIN NOTED PAIN PRSNT: CPT | Mod: CPTII,S$GLB,, | Performed by: INTERNAL MEDICINE

## 2023-01-26 PROCEDURE — 73100 XR WRIST 2 VIEW RIGHT: ICD-10-PCS | Mod: 26,RT,, | Performed by: RADIOLOGY

## 2023-01-26 PROCEDURE — 73100 X-RAY EXAM OF WRIST: CPT | Mod: TC,FY,RT

## 2023-01-26 PROCEDURE — 73030 XR SHOULDER COMPLETE 2 OR MORE VIEWS LEFT: ICD-10-PCS | Mod: 26,LT,, | Performed by: RADIOLOGY

## 2023-01-26 PROCEDURE — 1160F PR REVIEW ALL MEDS BY PRESCRIBER/CLIN PHARMACIST DOCUMENTED: ICD-10-PCS | Mod: CPTII,S$GLB,, | Performed by: INTERNAL MEDICINE

## 2023-01-26 PROCEDURE — 99999 PR PBB SHADOW E&M-EST. PATIENT-LVL IV: CPT | Mod: PBBFAC,,, | Performed by: INTERNAL MEDICINE

## 2023-01-26 PROCEDURE — 99214 OFFICE O/P EST MOD 30 MIN: CPT | Mod: S$GLB,,, | Performed by: INTERNAL MEDICINE

## 2023-01-26 NOTE — PROGRESS NOTES
"  Assessment:       1. Fall, initial encounter    2. Aortic atherosclerosis          Plan:         Kassandra BOBO was seen today for back pain.    Diagnoses and all orders for this visit:    Fall, initial encounter    New   Uncontrolled  Signs, symptoms consistent with contusion possible fx  history or pyhsical exam do not suggest dislocation    I provided limited to  instruction on supportive care measures   Prior tesing reviewed and new testing was was given     reviewed signs and symptoms that should prompt return to provider or evaluation in the ED  -     X-ray Shoulder 2 or More Views Right; Future  -     X-Ray Shoulder 2 or More Views Left; Future  -     X-Ray Wrist 2 View Right; Future  -     X-Ray Knee 3 View Right; Future  -     X-Ray Wrist 2 View Left; Future    Aortic atherosclerosis    Stable       Subjective:       Patient ID: Kassandra Rodriguez is a 82 y.o. female.    Chief Complaint: Back Pain    Patient reports a fall at the [facility] yesterday.  She reports mechanical fall. She did hit her head, she fell onto her wrist and knee.  She reports that she was able to get up saline she was able walk to the car.  She declined going to the emergency department.  She denies any headache or neck pain.  She does report some soreness in the bilateral shoulders.  She reports pain in both the wrist.  She states that when she fell she felt a numbness in the right wrist.  That has since dissipated.  She has pain in the right she denies any abdominal pain.    Back Pain      Review of Systems   Musculoskeletal:  Positive for back pain.   All other systems reviewed and are negative.          There are no preventive care reminders to display for this patient.      Objective:     /60 (BP Location: Left arm, Patient Position: Sitting, BP Method: Medium (Manual))   Pulse 92   Ht 5' 1" (1.549 m)   Wt 67.7 kg (149 lb 4 oz)   LMP  (LMP Unknown)   SpO2 97%   BMI 28.20 kg/m²     Vitals 1/26/2023 1/3/2023 11/16/2022 10/19/2022 " 7/22/2022   Height 61 61 61 - 61   Weight (lbs) 149.25 148.59 149.91 150 148   BMI (kg/m2) 28.2 28.1 28.3 - 28                Physical Exam  Nursing note reviewed.   Constitutional:       General: She is not in acute distress.     Appearance: Normal appearance. She is not ill-appearing, toxic-appearing or diaphoretic.   HENT:      Head: Normocephalic.   Eyes:      Conjunctiva/sclera: Conjunctivae normal.   Pulmonary:      Effort: Pulmonary effort is normal. No respiratory distress.   Musculoskeletal:      Comments: Sore in the bilateral shoulder, ttp in the bilateral wrist   Ttp of the right knee cap, no swelling    Neurological:      General: No focal deficit present.      Mental Status: She is alert and oriented to person, place, and time.   Psychiatric:         Mood and Affect: Mood normal.         Behavior: Behavior normal.         Thought Content: Thought content normal.         Judgment: Judgment normal.           Future Appointments   Date Time Provider Department Center   1/26/2023  5:00 PM [facility] XR-A LIMIT 350 LBS [facility] XRAY OP [place] [facility]   1/26/2023  5:30 PM [facility] XR-A LIMIT 350 LBS [facility] XRAY [facility]   2/15/2023  1:00 PM SHELLI Valle [facility] AUDIO [facility]   2/15/2023  1:30 PM Dalila Correa NP [facility] ENT [facility]   6/7/2023  9:00 AM LAB, [facility] LAB [place]   6/9/2023  2:00 PM Mesfin Perkins III, MD [facility]         Medication List with Changes/Refills   Current Medications    ACETAMINOPHEN (TYLENOL) 500 MG TABLET    Take 500 mg by mouth every 6 (six) hours as needed for Pain.    AMLODIPINE (NORVASC) 5 MG TABLET    Take 1 tablet (5 mg total) by mouth once daily.    ASPIRIN (ECOTRIN) 81 MG EC TABLET    Take 81 mg by mouth once daily.    CALCIUM-VITAMIN D 250-100 MG-UNIT PER TABLET    Take 1 tablet by mouth 2 (two) times daily.    CLOBETASOL 0.05% (TEMOVATE) 0.05 % OINT    Apply 1-2 times daily to top of vaginal lips/vulva area    CONJUGATED ESTROGENS (PREMARIN) VAGINAL  CREAM    Apply 1 applicator nightly vaginally  x 2 weeks then nightly 3x per week thereafter    FLUTICASONE PROPIONATE (FLONASE) 50 MCG/ACTUATION NASAL SPRAY    1 spray (50 mcg total) by Each Nostril route once daily.    LEVOCETIRIZINE (XYZAL) 5 MG TABLET    Take 1 tablet (5 mg total) by mouth every evening.    MIRABEGRON (MYRBETRIQ) 25 MG TB24 ER TABLET    Take 1 tablet (25 mg total) by mouth once daily.    MULTIVIT-MINS NO.63/IRON/FOLIC (M-VIT ORAL)    Take 1 tablet by mouth.    TURMERIC ROOT EXTRACT ORAL    Take by mouth.    VITAMIN D (VITAMIN D3) 1000 UNITS TAB    Take 1,000 Units by mouth once daily.         Disclaimer:  This note has been generated using voice-recognition software. There may be grammatical or spelling errors that have been missed during proof-reading

## 2023-01-26 NOTE — PROGRESS NOTES
"Assessment:       1. Fall, initial encounter          Plan:         Kassandra BOBO was seen today for back pain.    Diagnoses and all orders for this visit:    Fall, initial encounter  -     X-ray Shoulder 2 or More Views Right; Future  -     X-Ray Shoulder 2 or More Views Left; Future  -     X-Ray Wrist 2 View Right; Future  -     X-Ray Knee 3 View Right; Future  -     X-Ray Wrist 2 View Left; Future          Subjective:       Patient ID: Kassandra Rodriguez is a 82 y.o. female.    Chief Complaint: Back Pain        HPI    Review of Systems          There are no preventive care reminders to display for this patient.      Objective:     /60 (BP Location: Left arm, Patient Position: Sitting, BP Method: Medium (Manual))   Pulse 92   Ht 5' 1" (1.549 m)   Wt 67.7 kg (149 lb 4 oz)   LMP  (LMP Unknown)   SpO2 97%   BMI 28.20 kg/m²     Vitals 1/26/2023 1/3/2023 11/16/2022 10/19/2022 7/22/2022   Height 61 61 61 - 61   Weight (lbs) 149.25 148.59 149.91 150 148   BMI (kg/m2) 28.2 28.1 28.3 - 28                Physical Exam        Future Appointments   Date Time Provider Department Center   2/15/2023  1:00 PM SHELLI Valle [facility] AUDIO [facility]   2/15/2023  1:30 PM Dalila Correa NP [facility] ENT [facility]   6/7/2023  9:00 AM LAB, [facility] LAB [place]   6/9/2023  2:00 PM Mesfin Perkins III, MD [facility]         Medication List with Changes/Refills   Current Medications    ACETAMINOPHEN (TYLENOL) 500 MG TABLET    Take 500 mg by mouth every 6 (six) hours as needed for Pain.    AMLODIPINE (NORVASC) 5 MG TABLET    Take 1 tablet (5 mg total) by mouth once daily.    ASPIRIN (ECOTRIN) 81 MG EC TABLET    Take 81 mg by mouth once daily.    CALCIUM-VITAMIN D 250-100 MG-UNIT PER TABLET    Take 1 tablet by mouth 2 (two) times daily.    CLOBETASOL 0.05% (TEMOVATE) 0.05 % OINT    Apply 1-2 times daily to top of vaginal lips/vulva area    CONJUGATED ESTROGENS (PREMARIN) VAGINAL CREAM    Apply 1 applicator nightly vaginally  x 2 " weeks then nightly 3x per week thereafter    FLUTICASONE PROPIONATE (FLONASE) 50 MCG/ACTUATION NASAL SPRAY    1 spray (50 mcg total) by Each Nostril route once daily.    LEVOCETIRIZINE (XYZAL) 5 MG TABLET    Take 1 tablet (5 mg total) by mouth every evening.    MIRABEGRON (MYRBETRIQ) 25 MG TB24 ER TABLET    Take 1 tablet (25 mg total) by mouth once daily.    MULTIVIT-MINS NO.63/IRON/FOLIC (M-VIT ORAL)    Take 1 tablet by mouth.    TURMERIC ROOT EXTRACT ORAL    Take by mouth.    VITAMIN D (VITAMIN D3) 1000 UNITS TAB    Take 1,000 Units by mouth once daily.         Disclaimer:  This note has been generated using voice-recognition software. There may be grammatical or spelling errors that have been missed during proof-reading

## 2023-02-10 ENCOUNTER — TELEPHONE (OUTPATIENT)
Dept: INTERNAL MEDICINE | Facility: CLINIC | Age: 83
End: 2023-02-10
Payer: MEDICARE

## 2023-02-10 NOTE — TELEPHONE ENCOUNTER
----- Message from Mesfin Perkins III, MD sent at 1/27/2023  8:30 AM CST -----  Could we please call     Greetings  I have reviewed the results of your imaging test. I do not find anything concerning on this and the report is does not show any fractures. .  Please don't hesitate to reach out to me if you have additional questions

## 2023-02-10 NOTE — TELEPHONE ENCOUNTER
Patient called to confirm what I left on the message. I confirmed that Dr. Perkins had said there were no reports of fractures. Patient stated that she felt a lot better and thanked me and Dr. Perkins for telling her the news. She verbalized understanding

## 2023-02-15 ENCOUNTER — CLINICAL SUPPORT (OUTPATIENT)
Dept: AUDIOLOGY | Facility: CLINIC | Age: 83
End: 2023-02-15
Payer: MEDICARE

## 2023-02-15 ENCOUNTER — LAB VISIT (OUTPATIENT)
Dept: LAB | Facility: HOSPITAL | Age: 83
End: 2023-02-15
Payer: MEDICARE

## 2023-02-15 ENCOUNTER — OFFICE VISIT (OUTPATIENT)
Dept: OTOLARYNGOLOGY | Facility: CLINIC | Age: 83
End: 2023-02-15
Payer: MEDICARE

## 2023-02-15 VITALS — SYSTOLIC BLOOD PRESSURE: 134 MMHG | HEART RATE: 96 BPM | DIASTOLIC BLOOD PRESSURE: 80 MMHG

## 2023-02-15 DIAGNOSIS — H90.3 SENSORINEURAL HEARING LOSS (SNHL) OF BOTH EARS: Primary | ICD-10-CM

## 2023-02-15 DIAGNOSIS — H90.A22 SENSORINEURAL HEARING LOSS (SNHL) OF LEFT EAR WITH RESTRICTED HEARING OF RIGHT EAR: Primary | ICD-10-CM

## 2023-02-15 DIAGNOSIS — H93.13 TINNITUS, BILATERAL: ICD-10-CM

## 2023-02-15 DIAGNOSIS — H90.3 ASYMMETRIC SNHL (SENSORINEURAL HEARING LOSS): ICD-10-CM

## 2023-02-15 DIAGNOSIS — H61.21 RIGHT EAR IMPACTED CERUMEN: ICD-10-CM

## 2023-02-15 LAB
CREAT SERPL-MCNC: 0.9 MG/DL (ref 0.5–1.4)
EST. GFR  (NO RACE VARIABLE): >60 ML/MIN/1.73 M^2

## 2023-02-15 PROCEDURE — 69210 EAR CERUMEN REMOVAL: ICD-10-PCS | Mod: S$GLB,,, | Performed by: NURSE PRACTITIONER

## 2023-02-15 PROCEDURE — 3075F PR MOST RECENT SYSTOLIC BLOOD PRESS GE 130-139MM HG: ICD-10-PCS | Mod: CPTII,S$GLB,, | Performed by: NURSE PRACTITIONER

## 2023-02-15 PROCEDURE — 99999 PR PBB SHADOW E&M-EST. PATIENT-LVL III: CPT | Mod: PBBFAC,,, | Performed by: NURSE PRACTITIONER

## 2023-02-15 PROCEDURE — 92557 PR COMPREHENSIVE HEARING TEST: ICD-10-PCS | Mod: S$GLB,,,

## 2023-02-15 PROCEDURE — 1101F PR PT FALLS ASSESS DOC 0-1 FALLS W/OUT INJ PAST YR: ICD-10-PCS | Mod: CPTII,S$GLB,, | Performed by: NURSE PRACTITIONER

## 2023-02-15 PROCEDURE — 3288F FALL RISK ASSESSMENT DOCD: CPT | Mod: CPTII,S$GLB,, | Performed by: NURSE PRACTITIONER

## 2023-02-15 PROCEDURE — 99999 PR PBB SHADOW E&M-EST. PATIENT-LVL I: ICD-10-PCS | Mod: PBBFAC,,,

## 2023-02-15 PROCEDURE — 99999 PR PBB SHADOW E&M-EST. PATIENT-LVL I: CPT | Mod: PBBFAC,,,

## 2023-02-15 PROCEDURE — 99214 PR OFFICE/OUTPT VISIT, EST, LEVL IV, 30-39 MIN: ICD-10-PCS | Mod: 25,S$GLB,, | Performed by: NURSE PRACTITIONER

## 2023-02-15 PROCEDURE — 3288F PR FALLS RISK ASSESSMENT DOCUMENTED: ICD-10-PCS | Mod: CPTII,S$GLB,, | Performed by: NURSE PRACTITIONER

## 2023-02-15 PROCEDURE — 1126F AMNT PAIN NOTED NONE PRSNT: CPT | Mod: CPTII,S$GLB,, | Performed by: NURSE PRACTITIONER

## 2023-02-15 PROCEDURE — 1159F PR MEDICATION LIST DOCUMENTED IN MEDICAL RECORD: ICD-10-PCS | Mod: CPTII,S$GLB,, | Performed by: NURSE PRACTITIONER

## 2023-02-15 PROCEDURE — 3079F DIAST BP 80-89 MM HG: CPT | Mod: CPTII,S$GLB,, | Performed by: NURSE PRACTITIONER

## 2023-02-15 PROCEDURE — 82565 ASSAY OF CREATININE: CPT | Performed by: NURSE PRACTITIONER

## 2023-02-15 PROCEDURE — 92557 COMPREHENSIVE HEARING TEST: CPT | Mod: S$GLB,,,

## 2023-02-15 PROCEDURE — 69210 REMOVE IMPACTED EAR WAX UNI: CPT | Mod: S$GLB,,, | Performed by: NURSE PRACTITIONER

## 2023-02-15 PROCEDURE — 3075F SYST BP GE 130 - 139MM HG: CPT | Mod: CPTII,S$GLB,, | Performed by: NURSE PRACTITIONER

## 2023-02-15 PROCEDURE — 1101F PT FALLS ASSESS-DOCD LE1/YR: CPT | Mod: CPTII,S$GLB,, | Performed by: NURSE PRACTITIONER

## 2023-02-15 PROCEDURE — 1126F PR PAIN SEVERITY QUANTIFIED, NO PAIN PRESENT: ICD-10-PCS | Mod: CPTII,S$GLB,, | Performed by: NURSE PRACTITIONER

## 2023-02-15 PROCEDURE — 92567 PR TYMPA2METRY: ICD-10-PCS | Mod: S$GLB,,,

## 2023-02-15 PROCEDURE — 99999 PR PBB SHADOW E&M-EST. PATIENT-LVL III: ICD-10-PCS | Mod: PBBFAC,,, | Performed by: NURSE PRACTITIONER

## 2023-02-15 PROCEDURE — 1159F MED LIST DOCD IN RCRD: CPT | Mod: CPTII,S$GLB,, | Performed by: NURSE PRACTITIONER

## 2023-02-15 PROCEDURE — 3079F PR MOST RECENT DIASTOLIC BLOOD PRESSURE 80-89 MM HG: ICD-10-PCS | Mod: CPTII,S$GLB,, | Performed by: NURSE PRACTITIONER

## 2023-02-15 PROCEDURE — 99214 OFFICE O/P EST MOD 30 MIN: CPT | Mod: 25,S$GLB,, | Performed by: NURSE PRACTITIONER

## 2023-02-15 PROCEDURE — 36415 COLL VENOUS BLD VENIPUNCTURE: CPT | Performed by: NURSE PRACTITIONER

## 2023-02-15 PROCEDURE — 92567 TYMPANOMETRY: CPT | Mod: S$GLB,,,

## 2023-02-15 NOTE — PROCEDURES
Ear Cerumen Removal    Date/Time: 2/15/2023 1:30 PM  Performed by: Dalila Correa NP  Authorized by: Dalila Correa NP       Local anesthetic:  None  Location details:  Right ear  Procedure type: curette    Cerumen  Removal Results:  Cerumen completely removed  Patient tolerance:  Patient tolerated the procedure well with no immediate complications     Procedure Note:    The patient was brought to the minor procedure room and placed under the operating microscope of the right ear canal which was cleaned of ceruminous debris. Using a combination of suction, curettes and cup forceps the patient's cerumen impaction was removed. The patient tolerated the procedure well. There were no complications.

## 2023-02-15 NOTE — PROGRESS NOTES
Subjective:   Kassandra BOBO is a 82 y.o. female who comes for follow-up.    Today:  Kassandra Rodriguez presents for a hearing evaluation.  She reports years of left sided hearing loss and now the right ear has hearing loss as well. The left side is still significantly worse than the right.  She denies any otalgia, otorrhea, ear fullness/pressure, tinnitus or vertigo. She notes a remote history of vertigo a few years ago, but denies any recent episodes. She denies any history of ear surgery, ear trauma, or infection.  Denies a family history of early onset hearing loss or loud noise exposure.  She did trial of hearing aids in the past, but did not find them beneficial.     Note from previous visit on 1/4/2022 with Dr. Fabian:  Mrs. Kassandra Rodriguez  is here to see me today for evaluation of hearing loss.  She has feeling of aural fullness over the last 7 years. She notes that she has tried hearing aids in the past but did not feel that they worked for her.  She notes intermittent tinnitus bilaterally worse on the left.   She has had some nasal congestion with some blood tinged mucus.   PLAN: We reviewed the patient's recent audiogram and hearing loss in detail.  We also discussed that she is a good candidate for hearing aids, if and when she the patient is motivated.   We also discussed the use hearing protection when exposed to loud noise, including lawn equipment. Recommend audiogram in 1 year. Continue Flonase 2 sprays per nostril daily. Recommend nasal saline rinses BID.     The patient's medications, allergies, past medical, surgical, social and family histories were reviewed and updated as appropriate.    A detailed review of systems was obtained with pertinent positives as per the above HPI, and otherwise negative.   Objective:   /80   Pulse 96   LMP  (LMP Unknown)      Constitutional:   She is oriented to person, place, and time. She appears well-developed and well-nourished. She appears alert. She is cooperative.   Non-toxic appearance. She does not have a sickly appearance. She does not appear ill. Normal speech.      Head:  Normocephalic and atraumatic. Not macrocephalic and not microcephalic. Head is without raccoon's eyes, without Dillard's sign, without abrasion, without laceration, without right periorbital erythema, without left periorbital erythema and without TMJ tenderness.     Ears:    Right Ear: No lacerations. No drainage, swelling or tenderness. No foreign bodies. No mastoid tenderness. Tympanic membrane is not injected, not scarred, not perforated, not erythematous, not retracted and not bulging. No middle ear effusion. No hemotympanum.   Left Ear: No lacerations. No drainage, swelling or tenderness. No foreign bodies. No mastoid tenderness. Tympanic membrane is not injected, not scarred, not perforated, not erythematous, not retracted and not bulging.  No middle ear effusion. No hemotympanum.   Ears:      Pulmonary/Chest:   Effort normal.     Psychiatric:   She has a normal mood and affect. Her speech is normal and behavior is normal.     Neurological:   She is alert and oriented to person, place, and time.   Procedure  Cerumen removal performed.  See procedure note.    Data Reviewed  WBC (K/uL)   Date Value   11/14/2022 6.68     Eosinophil % (%)   Date Value   11/14/2022 6.6     Eos # (K/uL)   Date Value   11/14/2022 0.4     Platelets (K/uL)   Date Value   11/14/2022 294     Glucose (mg/dL)   Date Value   11/14/2022 74     No results found for: IGE  Audiogram          I independently reviewed the tracings of the complete audiometric evaluation performed today.  I reviewed the audiogram with the patient as well.  Pertinent findings include normal hearing steeply sloping to moderately severe high frequency hearing loss in the right ear and mild sensorineural hearing loss in the left ear.     Imaging  No pertinent imaging available.  Assessment:     1. Sensorineural hearing loss (SNHL) of both ears    2.  Asymmetric SNHL (sensorineural hearing loss)    3. Right ear impacted cerumen      Plan:   I had a long discussion with the patient regarding her condition and the further workup and management options.     Sensorineural hearing loss (SNHL) of both ears  More severe in the left ear-medically cleared for HA in the left. Audiometric testing interpretation consistent with sensorineural Hearing conservation in noisy environments.     Asymmetric SNHL (sensorineural hearing loss)  We discussed the potential causes of asymmetrical hearing loss including: normal aging (presbycusis), noise-induced hearing loss, genetic causes, medications/drugs, microvascular changes, injury to the head or ear and structural problems of the inner ear.  An MRI was recommended to rule out a definite cause and help clarify whether there is an underlying structural abnormality.    -     CREATININE, SERUM; Future  -     MRI IAC/Temporal Bones W W/O Contrast; Future    Right ear impacted cerumen  Ear cleaning performed.     Return to clinic every year for audiometric testing.

## 2023-02-15 NOTE — PROGRESS NOTES
Kassandra Rodriguez was seen today in the clinic for an audiologic evaluation.  Patient's main complaint was decreased hearing bilaterally.  Ms. Rodriguez reported a history of hearing loss that is worse in her left ear and previous hearing aid use. She was unhappy with her previous hearing aids as they were often filled with wax making them non-functional. Ms. Rodriguez also noted intermittent tinnitus bilaterally. She denied otalgia, aural fullness, and vertigo.     Tympanometry revealed Type A in the right ear and Type As in the left ear.     Audiogram results revealed normal hearing steeply sloping to moderately severe high frequency hearing loss in the right ear and mild sensorineural hearing loss in the left ear.      Speech reception thresholds were noted at 10 dB in the right ear and 20 dB in the left ear.    Speech discrimination scores were 100% in the right ear and 96% in the left ear.    Recommendations:  Otologic evaluation  Annual audiogram  Hearing protection when in noise  Hearing aid consultation

## 2023-02-16 DIAGNOSIS — I10 ESSENTIAL HYPERTENSION: ICD-10-CM

## 2023-02-16 RX ORDER — AMLODIPINE BESYLATE 5 MG/1
TABLET ORAL
Qty: 90 TABLET | Refills: 3 | Status: SHIPPED | OUTPATIENT
Start: 2023-02-16

## 2023-02-16 NOTE — TELEPHONE ENCOUNTER
No new care gaps identified.  Health [facility] Embedded Care Gaps. Reference number: [id number]. 2/16/2023   9:01:50 AM CST

## 2023-02-16 NOTE — TELEPHONE ENCOUNTER
Refill Decision Note   Kassandra Rodriguez  is requesting a refill authorization.  Brief Assessment and Rationale for Refill:  Approve     Medication Therapy Plan:       Medication Reconciliation Completed: No   Comments:            Note composed:11:45 AM 02/16/2023

## 2023-04-10 PROBLEM — Z00.00 ENCOUNTER FOR PREVENTIVE HEALTH EXAMINATION: Status: RESOLVED | Noted: 2023-01-03 | Resolved: 2023-04-10

## 2023-06-07 ENCOUNTER — LAB VISIT (OUTPATIENT)
Dept: LAB | Facility: HOSPITAL | Age: 83
End: 2023-06-07
Attending: INTERNAL MEDICINE
Payer: MEDICARE

## 2023-06-07 DIAGNOSIS — I10 ESSENTIAL HYPERTENSION: ICD-10-CM

## 2023-06-07 LAB
ANION GAP SERPL CALC-SCNC: 9 MMOL/L (ref 8–16)
BUN SERPL-MCNC: 13 MG/DL (ref 8–23)
CALCIUM SERPL-MCNC: 9.5 MG/DL (ref 8.7–10.5)
CHLORIDE SERPL-SCNC: 105 MMOL/L (ref 95–110)
CO2 SERPL-SCNC: 27 MMOL/L (ref 23–29)
CREAT SERPL-MCNC: 0.8 MG/DL (ref 0.5–1.4)
EST. GFR  (NO RACE VARIABLE): >60 ML/MIN/1.73 M^2
GLUCOSE SERPL-MCNC: 85 MG/DL (ref 70–110)
POTASSIUM SERPL-SCNC: 4 MMOL/L (ref 3.5–5.1)
SODIUM SERPL-SCNC: 141 MMOL/L (ref 136–145)

## 2023-06-07 PROCEDURE — 36415 COLL VENOUS BLD VENIPUNCTURE: CPT | Mod: PO | Performed by: INTERNAL MEDICINE

## 2023-06-07 PROCEDURE — 80048 BASIC METABOLIC PNL TOTAL CA: CPT | Performed by: INTERNAL MEDICINE

## 2023-06-08 ENCOUNTER — TELEPHONE (OUTPATIENT)
Dept: INTERNAL MEDICINE | Facility: CLINIC | Age: 83
End: 2023-06-08
Payer: MEDICARE

## 2023-06-08 NOTE — TELEPHONE ENCOUNTER
.Greetings,    At this time your appointment has been canceled due to the provider no longer being in clinic on your scheduled date. Sorry for any inconvenience this may cause you.  Please call the office back at [phone] to reschedule. Thanks.

## 2023-06-16 ENCOUNTER — OFFICE VISIT (OUTPATIENT)
Dept: INTERNAL MEDICINE | Facility: CLINIC | Age: 83
End: 2023-06-16
Payer: MEDICARE

## 2023-06-16 VITALS
DIASTOLIC BLOOD PRESSURE: 80 MMHG | HEIGHT: 61 IN | WEIGHT: 149.69 LBS | SYSTOLIC BLOOD PRESSURE: 126 MMHG | HEART RATE: 78 BPM | BODY MASS INDEX: 28.26 KG/M2 | OXYGEN SATURATION: 99 %

## 2023-06-16 DIAGNOSIS — I10 ESSENTIAL HYPERTENSION: Primary | ICD-10-CM

## 2023-06-16 DIAGNOSIS — R41.3 MEMORY DEFICIT: ICD-10-CM

## 2023-06-16 PROCEDURE — 1160F PR REVIEW ALL MEDS BY PRESCRIBER/CLIN PHARMACIST DOCUMENTED: ICD-10-PCS | Mod: CPTII,S$GLB,, | Performed by: INTERNAL MEDICINE

## 2023-06-16 PROCEDURE — 1126F AMNT PAIN NOTED NONE PRSNT: CPT | Mod: CPTII,S$GLB,, | Performed by: INTERNAL MEDICINE

## 2023-06-16 PROCEDURE — 3079F DIAST BP 80-89 MM HG: CPT | Mod: CPTII,S$GLB,, | Performed by: INTERNAL MEDICINE

## 2023-06-16 PROCEDURE — 1160F RVW MEDS BY RX/DR IN RCRD: CPT | Mod: CPTII,S$GLB,, | Performed by: INTERNAL MEDICINE

## 2023-06-16 PROCEDURE — 99214 OFFICE O/P EST MOD 30 MIN: CPT | Mod: S$GLB,,, | Performed by: INTERNAL MEDICINE

## 2023-06-16 PROCEDURE — 3074F SYST BP LT 130 MM HG: CPT | Mod: CPTII,S$GLB,, | Performed by: INTERNAL MEDICINE

## 2023-06-16 PROCEDURE — 3288F FALL RISK ASSESSMENT DOCD: CPT | Mod: CPTII,S$GLB,, | Performed by: INTERNAL MEDICINE

## 2023-06-16 PROCEDURE — 3079F PR MOST RECENT DIASTOLIC BLOOD PRESSURE 80-89 MM HG: ICD-10-PCS | Mod: CPTII,S$GLB,, | Performed by: INTERNAL MEDICINE

## 2023-06-16 PROCEDURE — 1159F PR MEDICATION LIST DOCUMENTED IN MEDICAL RECORD: ICD-10-PCS | Mod: CPTII,S$GLB,, | Performed by: INTERNAL MEDICINE

## 2023-06-16 PROCEDURE — 1126F PR PAIN SEVERITY QUANTIFIED, NO PAIN PRESENT: ICD-10-PCS | Mod: CPTII,S$GLB,, | Performed by: INTERNAL MEDICINE

## 2023-06-16 PROCEDURE — 99214 PR OFFICE/OUTPT VISIT, EST, LEVL IV, 30-39 MIN: ICD-10-PCS | Mod: S$GLB,,, | Performed by: INTERNAL MEDICINE

## 2023-06-16 PROCEDURE — 99999 PR PBB SHADOW E&M-EST. PATIENT-LVL III: ICD-10-PCS | Mod: PBBFAC,,, | Performed by: INTERNAL MEDICINE

## 2023-06-16 PROCEDURE — 3074F PR MOST RECENT SYSTOLIC BLOOD PRESSURE < 130 MM HG: ICD-10-PCS | Mod: CPTII,S$GLB,, | Performed by: INTERNAL MEDICINE

## 2023-06-16 PROCEDURE — 3288F PR FALLS RISK ASSESSMENT DOCUMENTED: ICD-10-PCS | Mod: CPTII,S$GLB,, | Performed by: INTERNAL MEDICINE

## 2023-06-16 PROCEDURE — 1101F PR PT FALLS ASSESS DOC 0-1 FALLS W/OUT INJ PAST YR: ICD-10-PCS | Mod: CPTII,S$GLB,, | Performed by: INTERNAL MEDICINE

## 2023-06-16 PROCEDURE — 1159F MED LIST DOCD IN RCRD: CPT | Mod: CPTII,S$GLB,, | Performed by: INTERNAL MEDICINE

## 2023-06-16 PROCEDURE — 99999 PR PBB SHADOW E&M-EST. PATIENT-LVL III: CPT | Mod: PBBFAC,,, | Performed by: INTERNAL MEDICINE

## 2023-06-16 PROCEDURE — 1101F PT FALLS ASSESS-DOCD LE1/YR: CPT | Mod: CPTII,S$GLB,, | Performed by: INTERNAL MEDICINE

## 2023-06-16 NOTE — PROGRESS NOTES
Assessment:       1. Essential hypertension          Plan:         Diagnoses and all orders for this visit:    Essential hypertension  Chronic  Controlled  Patient is at goal today   I have reviewed lifestyle modification to achieve/maintain goals  We will continue the current medication regimen as listed below  Patient will follow up in 6 months   -     Basic Metabolic Panel; Standing      We can check in 6 months labs and urine   [facility] [phone]    Subjective:       Patient ID: Kassandra Rodriguez is a 82 y.o. female.    Chief Complaint: No chief complaint on file.      Interim Hx  Seen by ENT - ordered for MRI -not done     Concerns today      Chronic problems       Hypertension  This is a chronic problem. The current episode started more than 1 year ago. The problem has been waxing and waning since onset. Past treatments include calcium channel blockers.     Review of Systems   All other systems reviewed and are negative.          Health Maintenance Due   Topic Date Due    COVID-19 Vaccine (6 - Pfizer series) 02/19/2023         Objective:     LMP  (LMP Unknown)     Vitals 1/26/2023 1/3/2023 11/16/2022 10/19/2022 7/22/2022   Height 61 61 61 - 61   Weight (lbs) 149.25 148.59 149.91 150 148   BMI (kg/m2) 28.2 28.1 28.3 - 28              Physical Exam  Vitals and nursing note reviewed.   Constitutional:       General: She is not in acute distress.     Appearance: She is well-developed. She is not diaphoretic.   HENT:      Head: Normocephalic.      Nose: Nose normal.   Eyes:      General:         Right eye: No discharge.         Left eye: No discharge.      Conjunctiva/sclera: Conjunctivae normal.      Pupils: Pupils are equal, round, and reactive to light.   Cardiovascular:      Rate and Rhythm: Normal rate and regular rhythm.      Heart sounds: Normal heart sounds. No murmur heard.    No friction rub. No gallop.   Pulmonary:      Effort: Pulmonary effort is normal. No respiratory distress.    Abdominal:      General: Bowel sounds are normal. There is no distension.      Palpations: Abdomen is soft.   Musculoskeletal:         General: No deformity. Normal range of motion.      Cervical back: Normal range of motion.   Skin:     General: Skin is warm.   Neurological:      Mental Status: She is alert and oriented to person, place, and time.      Cranial Nerves: No cranial nerve deficit.           Recent Results (from the past 336 hour(s))   Basic Metabolic Panel    Collection Time: 06/07/23  8:48 AM   Result Value Ref Range    Sodium 141 136 - 145 mmol/L    Potassium 4.0 3.5 - 5.1 mmol/L    Chloride 105 95 - 110 mmol/L    CO2 27 23 - 29 mmol/L    Glucose 85 70 - 110 mg/dL    BUN 13 8 - 23 mg/dL    Creatinine 0.8 0.5 - 1.4 mg/dL    Calcium 9.5 8.7 - 10.5 mg/dL    Anion Gap 9 8 - 16 mmol/L    eGFR >60.0 >60 mL/min/1.73 m^2       Future Appointments   Date Time Provider Department Center   12/15/2023  7:00 AM [facility]   12/15/2023  7:15 AM [facility]   12/18/2023  9:40 AM Mesfin Perkins III, MD [facility]         Medication List with Changes/Refills   Current Medications    ACETAMINOPHEN (TYLENOL) 500 MG TABLET    Take 500 mg by mouth every 6 (six) hours as needed for Pain.    AMLODIPINE (NORVASC) 5 MG TABLET    Take 1 tablet by mouth once daily    ASPIRIN (ECOTRIN) 81 MG EC TABLET    Take 81 mg by mouth once daily.    CALCIUM-VITAMIN D 250-100 MG-UNIT PER TABLET    Take 1 tablet by mouth 2 (two) times daily.    CLOBETASOL 0.05% (TEMOVATE) 0.05 % OINT    Apply 1-2 times daily to top of vaginal lips/vulva area    CONJUGATED ESTROGENS (PREMARIN) VAGINAL CREAM    Apply 1 applicator nightly vaginally  x 2 weeks then nightly 3x per week thereafter    FLUTICASONE PROPIONATE (FLONASE) 50 MCG/ACTUATION NASAL SPRAY    1 spray (50 mcg total) by Each Nostril route once daily.    LEVOCETIRIZINE (XYZAL) 5 MG TABLET    Take 1 tablet (5 mg total) by mouth every  evening.    MIRABEGRON (MYRBETRIQ) 25 MG TB24 ER TABLET    Take 1 tablet (25 mg total) by mouth once daily.    MULTIVIT-MINS NO.63/IRON/FOLIC (M-VIT ORAL)    Take 1 tablet by mouth.    TURMERIC ROOT EXTRACT ORAL    Take by mouth.    VITAMIN D (VITAMIN D3) 1000 UNITS TAB    Take 1,000 Units by mouth once daily.         Disclaimer:  This note has been generated using voice-recognition software. There may be grammatical or spelling errors that have been missed during proof-reading

## 2023-07-18 ENCOUNTER — OFFICE VISIT (OUTPATIENT)
Dept: INTERNAL MEDICINE | Facility: CLINIC | Age: 83
End: 2023-07-18
Payer: MEDICARE

## 2023-07-18 VITALS
BODY MASS INDEX: 28.14 KG/M2 | HEART RATE: 89 BPM | OXYGEN SATURATION: 96 % | WEIGHT: 149.06 LBS | SYSTOLIC BLOOD PRESSURE: 120 MMHG | HEIGHT: 61 IN | DIASTOLIC BLOOD PRESSURE: 60 MMHG

## 2023-07-18 DIAGNOSIS — N95.2 ATROPHIC VAGINITIS: Primary | ICD-10-CM

## 2023-07-18 PROCEDURE — 1159F PR MEDICATION LIST DOCUMENTED IN MEDICAL RECORD: ICD-10-PCS | Mod: CPTII,S$GLB,, | Performed by: INTERNAL MEDICINE

## 2023-07-18 PROCEDURE — 99999 PR PBB SHADOW E&M-EST. PATIENT-LVL III: ICD-10-PCS | Mod: PBBFAC,,, | Performed by: INTERNAL MEDICINE

## 2023-07-18 PROCEDURE — 1126F AMNT PAIN NOTED NONE PRSNT: CPT | Mod: CPTII,S$GLB,, | Performed by: INTERNAL MEDICINE

## 2023-07-18 PROCEDURE — 3078F PR MOST RECENT DIASTOLIC BLOOD PRESSURE < 80 MM HG: ICD-10-PCS | Mod: CPTII,S$GLB,, | Performed by: INTERNAL MEDICINE

## 2023-07-18 PROCEDURE — 3288F PR FALLS RISK ASSESSMENT DOCUMENTED: ICD-10-PCS | Mod: CPTII,S$GLB,, | Performed by: INTERNAL MEDICINE

## 2023-07-18 PROCEDURE — 1160F RVW MEDS BY RX/DR IN RCRD: CPT | Mod: CPTII,S$GLB,, | Performed by: INTERNAL MEDICINE

## 2023-07-18 PROCEDURE — 3074F PR MOST RECENT SYSTOLIC BLOOD PRESSURE < 130 MM HG: ICD-10-PCS | Mod: CPTII,S$GLB,, | Performed by: INTERNAL MEDICINE

## 2023-07-18 PROCEDURE — 1126F PR PAIN SEVERITY QUANTIFIED, NO PAIN PRESENT: ICD-10-PCS | Mod: CPTII,S$GLB,, | Performed by: INTERNAL MEDICINE

## 2023-07-18 PROCEDURE — 99214 PR OFFICE/OUTPT VISIT, EST, LEVL IV, 30-39 MIN: ICD-10-PCS | Mod: S$GLB,,, | Performed by: INTERNAL MEDICINE

## 2023-07-18 PROCEDURE — 99999 PR PBB SHADOW E&M-EST. PATIENT-LVL III: CPT | Mod: PBBFAC,,, | Performed by: INTERNAL MEDICINE

## 2023-07-18 PROCEDURE — 1100F PTFALLS ASSESS-DOCD GE2>/YR: CPT | Mod: CPTII,S$GLB,, | Performed by: INTERNAL MEDICINE

## 2023-07-18 PROCEDURE — 99214 OFFICE O/P EST MOD 30 MIN: CPT | Mod: S$GLB,,, | Performed by: INTERNAL MEDICINE

## 2023-07-18 PROCEDURE — 1159F MED LIST DOCD IN RCRD: CPT | Mod: CPTII,S$GLB,, | Performed by: INTERNAL MEDICINE

## 2023-07-18 PROCEDURE — 1160F PR REVIEW ALL MEDS BY PRESCRIBER/CLIN PHARMACIST DOCUMENTED: ICD-10-PCS | Mod: CPTII,S$GLB,, | Performed by: INTERNAL MEDICINE

## 2023-07-18 PROCEDURE — 3288F FALL RISK ASSESSMENT DOCD: CPT | Mod: CPTII,S$GLB,, | Performed by: INTERNAL MEDICINE

## 2023-07-18 PROCEDURE — 3074F SYST BP LT 130 MM HG: CPT | Mod: CPTII,S$GLB,, | Performed by: INTERNAL MEDICINE

## 2023-07-18 PROCEDURE — 1100F PR PT FALLS ASSESS DOC 2+ FALLS/FALL W/INJURY/YR: ICD-10-PCS | Mod: CPTII,S$GLB,, | Performed by: INTERNAL MEDICINE

## 2023-07-18 PROCEDURE — 3078F DIAST BP <80 MM HG: CPT | Mod: CPTII,S$GLB,, | Performed by: INTERNAL MEDICINE

## 2023-07-18 RX ORDER — ESTRADIOL 0.1 MG/G
3 CREAM VAGINAL DAILY
Qty: 90 G | Refills: 0 | Status: SHIPPED | OUTPATIENT
Start: 2023-07-18 | End: 2023-11-01 | Stop reason: SDUPTHER

## 2023-07-18 NOTE — PROGRESS NOTES
"Assessment:       1. Atrophic vaginitis        Plan:         Kassandra BOOB was seen today for mass.    Diagnoses and all orders for this visit:    Atrophic vaginitis  -     estradioL (ESTRACE) 0.01 % (0.1 mg/gram) vaginal cream; Place 3 g vaginally once daily.        New   Uncontrolled  Signs, symptoms consistent with varginal dryensss   history or pyhsical exam do not suggest abcess  DDx includes but not limited to bartholin cyst   I provided instruction on supportive care measures   Prior tesing reviewed and new testing was was not indicated  begin as below  Fu in 4 weeks if no improvement in symptoms    reviewed signs and symptoms that should prompt return to provider or evaluation in the ED    Subjective:       Patient ID: Kassandra Rodriguez is a 82 y.o. female.    Chief Complaint: Mass    Patient reports this has been ongoing for month, pain, irritation , itching and dryness and the bottom part of the vulva. She has not had any vesicles, fevers chills, sweat. Not using and new soap, detergents. No ho Bca.         HPI    Review of Systems   All other systems reviewed and are negative.          Health Maintenance Due   Topic Date Due    COVID-19 Vaccine (6 - Pfizer series) 02/19/2023    DEXA Scan  08/14/2023         Objective:     /60 (BP Location: Right arm, Patient Position: Sitting, BP Method: Medium (Manual))   Pulse 89   Ht 5' 1" (1.549 m)   Wt 67.6 kg (149 lb 0.5 oz)   LMP  (LMP Unknown)   SpO2 96%   BMI 28.16 kg/m²     Vitals 7/18/2023 6/16/2023 1/26/2023 1/3/2023 11/16/2022   Height 61 61 61 61 61   Weight (lbs) 149.03 149.69 149.25 148.59 149.91   BMI (kg/m2) 28.2 28.3 28.2 28.1 28.3                Physical Exam  Nursing note reviewed.   Constitutional:       General: She is not in acute distress.     Appearance: Normal appearance. She is not ill-appearing, toxic-appearing or diaphoretic.   HENT:      Head: Normocephalic.   Eyes:      Conjunctiva/sclera: Conjunctivae normal.   Pulmonary:      Effort: " Pulmonary effort is normal. No respiratory distress.   Genitourinary:     Comments: Significant dryness , slight tear without bleeding 5oclock posisition   Neurological:      General: No focal deficit present.      Mental Status: She is alert and oriented to person, place, and time.   Psychiatric:         Mood and Affect: Mood normal.         Behavior: Behavior normal.         Thought Content: Thought content normal.         Judgment: Judgment normal.           Future Appointments   Date Time Provider Department Center   10/30/2023 10:00 AM [facility]ER[facility]AT[facility]IC CLINIC 2 [facility] PSYCH [address]   12/15/2023  7:00 AM [facility] [place]   12/15/2023  7:15 AM [facility] [place]   12/18/2023  9:40 AM Mesfin Perkins III, MD [facility]         Medication List with Changes/Refills   New Medications    ESTRADIOL (ESTRACE) 0.01 % (0.1 MG/GRAM) VAGINAL CREAM    Place 3 g vaginally once daily.   Current Medications    ACETAMINOPHEN (TYLENOL) 500 MG TABLET    Take 500 mg by mouth every 6 (six) hours as needed for Pain.    AMLODIPINE (NORVASC) 5 MG TABLET    Take 1 tablet by mouth once daily    ASPIRIN (ECOTRIN) 81 MG EC TABLET    Take 81 mg by mouth once daily.    CALCIUM-VITAMIN D 250-100 MG-UNIT PER TABLET    Take 1 tablet by mouth 2 (two) times daily.    CLOBETASOL 0.05% (TEMOVATE) 0.05 % OINT    Apply 1-2 times daily to top of vaginal lips/vulva area    CONJUGATED ESTROGENS (PREMARIN) VAGINAL CREAM    Apply 1 applicator nightly vaginally  x 2 weeks then nightly 3x per week thereafter    FLUTICASONE PROPIONATE (FLONASE) 50 MCG/ACTUATION NASAL SPRAY    1 spray (50 mcg total) by Each Nostril route once daily.    LEVOCETIRIZINE (XYZAL) 5 MG TABLET    Take 1 tablet (5 mg total) by mouth every evening.    MIRABEGRON (MYRBETRIQ) 25 MG TB24 ER TABLET    Take 1 tablet (25 mg total) by mouth once daily.    MULTIVIT-MINS NO.63/IRON/FOLIC (M-VIT ORAL)    Take 1 tablet by mouth.    TURMERIC  ROOT EXTRACT ORAL    Take by mouth.    VITAMIN D (VITAMIN D3) 1000 UNITS TAB    Take 1,000 Units by mouth once daily.         Disclaimer:  This note has been generated using voice-recognition software. There may be grammatical or spelling errors that have been missed during proof-reading

## 2023-07-24 ENCOUNTER — TELEPHONE (OUTPATIENT)
Dept: FAMILY MEDICINE | Facility: CLINIC | Age: 83
End: 2023-07-24
Payer: MEDICARE

## 2023-07-24 DIAGNOSIS — Z12.31 SCREENING MAMMOGRAM, ENCOUNTER FOR: Primary | ICD-10-CM

## 2023-08-03 ENCOUNTER — HOSPITAL ENCOUNTER (OUTPATIENT)
Dept: RADIOLOGY | Facility: HOSPITAL | Age: 83
Discharge: HOME | End: 2023-08-03
Attending: INTERNAL MEDICINE
Payer: MEDICARE

## 2023-08-03 DIAGNOSIS — Z12.31 SCREENING MAMMOGRAM, ENCOUNTER FOR: ICD-10-CM

## 2023-08-03 PROCEDURE — 77063 MAMMO DIGITAL SCREENING BILAT WITH TOMO: ICD-10-PCS | Mod: 26,,, | Performed by: RADIOLOGY

## 2023-08-03 PROCEDURE — 77063 BREAST TOMOSYNTHESIS BI: CPT | Mod: 26,,, | Performed by: RADIOLOGY

## 2023-08-03 PROCEDURE — 77067 MAMMO DIGITAL SCREENING BILAT WITH TOMO: ICD-10-PCS | Mod: 26,,, | Performed by: RADIOLOGY

## 2023-08-03 PROCEDURE — 77067 SCR MAMMO BI INCL CAD: CPT | Mod: TC

## 2023-08-03 PROCEDURE — 77067 SCR MAMMO BI INCL CAD: CPT | Mod: 26,,, | Performed by: RADIOLOGY

## 2023-08-07 NOTE — PROGRESS NOTES
"Please call pt doesn't have portal     _____  "  I have reviewed the results of your mammogram and everything looks normal. Please don't hesitate to reach out to me if you have any questions regarding the test. We and repeat this in one year. Have a great day!"  "

## 2023-08-08 ENCOUNTER — TELEPHONE (OUTPATIENT)
Dept: INTERNAL MEDICINE | Facility: CLINIC | Age: 83
End: 2023-08-08
Payer: MEDICARE

## 2023-08-08 NOTE — TELEPHONE ENCOUNTER
"----- Message from Mesfin Perkins III, MD sent at 8/7/2023  3:00 PM CDT -----  Please call pt doesn't have portal     _____  "  I have reviewed the results of your mammogram and everything looks normal. Please don't hesitate to reach out to me if you have any questions regarding the test. We and repeat this in one year. Have a great day!"    "

## 2023-08-08 NOTE — TELEPHONE ENCOUNTER
Spoke with pt and informed pt per dr zamudio pt mammogram came back normal, repeat in a year. Pt verbalized understand

## 2023-09-21 DIAGNOSIS — Z78.0 MENOPAUSE: ICD-10-CM

## 2023-10-30 ENCOUNTER — OFFICE VISIT (OUTPATIENT)
Dept: PSYCHIATRY | Facility: CLINIC | Age: 83
End: 2023-10-30
Payer: COMMERCIAL

## 2023-10-30 ENCOUNTER — LAB VISIT (OUTPATIENT)
Dept: LAB | Facility: HOSPITAL | Age: 83
End: 2023-10-30
Payer: MEDICARE

## 2023-10-30 VITALS
BODY MASS INDEX: 28.37 KG/M2 | SYSTOLIC BLOOD PRESSURE: 128 MMHG | HEART RATE: 88 BPM | WEIGHT: 150.25 LBS | HEIGHT: 61 IN | DIASTOLIC BLOOD PRESSURE: 77 MMHG

## 2023-10-30 DIAGNOSIS — G31.84 MILD NEUROCOGNITIVE DISORDER: ICD-10-CM

## 2023-10-30 LAB
ALBUMIN SERPL BCP-MCNC: 3.5 G/DL (ref 3.5–5.2)
ALP SERPL-CCNC: 64 U/L (ref 55–135)
ALT SERPL W/O P-5'-P-CCNC: 14 U/L (ref 10–44)
ANION GAP SERPL CALC-SCNC: 7 MMOL/L (ref 8–16)
AST SERPL-CCNC: 19 U/L (ref 10–40)
BASOPHILS # BLD AUTO: 0.06 K/UL (ref 0–0.2)
BASOPHILS NFR BLD: 0.9 % (ref 0–1.9)
BILIRUB SERPL-MCNC: 0.3 MG/DL (ref 0.1–1)
BUN SERPL-MCNC: 17 MG/DL (ref 8–23)
CALCIUM SERPL-MCNC: 9.6 MG/DL (ref 8.7–10.5)
CHLORIDE SERPL-SCNC: 108 MMOL/L (ref 95–110)
CO2 SERPL-SCNC: 27 MMOL/L (ref 23–29)
CREAT SERPL-MCNC: 0.8 MG/DL (ref 0.5–1.4)
DIFFERENTIAL METHOD: ABNORMAL
EOSINOPHIL # BLD AUTO: 0.5 K/UL (ref 0–0.5)
EOSINOPHIL NFR BLD: 7.3 % (ref 0–8)
ERYTHROCYTE [DISTWIDTH] IN BLOOD BY AUTOMATED COUNT: 13.7 % (ref 11.5–14.5)
ERYTHROCYTE [SEDIMENTATION RATE] IN BLOOD BY PHOTOMETRIC METHOD: 20 MM/HR (ref 0–36)
EST. GFR  (NO RACE VARIABLE): >60 ML/MIN/1.73 M^2
FOLATE SERPL-MCNC: 17.4 NG/ML (ref 4–24)
GLUCOSE SERPL-MCNC: 94 MG/DL (ref 70–110)
HCT VFR BLD AUTO: 39.5 % (ref 37–48.5)
HGB BLD-MCNC: 12.8 G/DL (ref 12–16)
IMM GRANULOCYTES # BLD AUTO: 0.01 K/UL (ref 0–0.04)
IMM GRANULOCYTES NFR BLD AUTO: 0.1 % (ref 0–0.5)
LYMPHOCYTES # BLD AUTO: 3.1 K/UL (ref 1–4.8)
LYMPHOCYTES NFR BLD: 46 % (ref 18–48)
MCH RBC QN AUTO: 32.4 PG (ref 27–31)
MCHC RBC AUTO-ENTMCNC: 32.4 G/DL (ref 32–36)
MCV RBC AUTO: 100 FL (ref 82–98)
MONOCYTES # BLD AUTO: 0.5 K/UL (ref 0.3–1)
MONOCYTES NFR BLD: 7.9 % (ref 4–15)
NEUTROPHILS # BLD AUTO: 2.5 K/UL (ref 1.8–7.7)
NEUTROPHILS NFR BLD: 37.8 % (ref 38–73)
NRBC BLD-RTO: 0 /100 WBC
PLATELET # BLD AUTO: 244 K/UL (ref 150–450)
PMV BLD AUTO: 10.5 FL (ref 9.2–12.9)
POTASSIUM SERPL-SCNC: 3.8 MMOL/L (ref 3.5–5.1)
PROT SERPL-MCNC: 8.3 G/DL (ref 6–8.4)
RBC # BLD AUTO: 3.95 M/UL (ref 4–5.4)
RPR SER QL: NORMAL
SODIUM SERPL-SCNC: 142 MMOL/L (ref 136–145)
T3 SERPL-MCNC: 86 NG/DL (ref 60–180)
T4 FREE SERPL-MCNC: 1.02 NG/DL (ref 0.71–1.51)
TSH SERPL DL<=0.005 MIU/L-ACNC: 0.75 UIU/ML (ref 0.4–4)
VIT B12 SERPL-MCNC: 1061 PG/ML (ref 210–950)
WBC # BLD AUTO: 6.69 K/UL (ref 3.9–12.7)

## 2023-10-30 PROCEDURE — 3074F SYST BP LT 130 MM HG: CPT | Mod: CPTII,S$GLB,, | Performed by: PSYCHIATRY & NEUROLOGY

## 2023-10-30 PROCEDURE — 3074F PR MOST RECENT SYSTOLIC BLOOD PRESSURE < 130 MM HG: ICD-10-PCS | Mod: CPTII,S$GLB,, | Performed by: PSYCHIATRY & NEUROLOGY

## 2023-10-30 PROCEDURE — 99999 PR PBB SHADOW E&M-EST. PATIENT-LVL III: CPT | Mod: PBBFAC,,,

## 2023-10-30 PROCEDURE — 85025 COMPLETE CBC W/AUTO DIFF WBC: CPT | Performed by: STUDENT IN AN ORGANIZED HEALTH CARE EDUCATION/TRAINING PROGRAM

## 2023-10-30 PROCEDURE — 80053 COMPREHEN METABOLIC PANEL: CPT | Performed by: STUDENT IN AN ORGANIZED HEALTH CARE EDUCATION/TRAINING PROGRAM

## 2023-10-30 PROCEDURE — 36415 COLL VENOUS BLD VENIPUNCTURE: CPT | Performed by: STUDENT IN AN ORGANIZED HEALTH CARE EDUCATION/TRAINING PROGRAM

## 2023-10-30 PROCEDURE — 84439 ASSAY OF FREE THYROXINE: CPT | Performed by: STUDENT IN AN ORGANIZED HEALTH CARE EDUCATION/TRAINING PROGRAM

## 2023-10-30 PROCEDURE — 3078F DIAST BP <80 MM HG: CPT | Mod: CPTII,S$GLB,, | Performed by: PSYCHIATRY & NEUROLOGY

## 2023-10-30 PROCEDURE — 99999 PR PBB SHADOW E&M-EST. PATIENT-LVL III: ICD-10-PCS | Mod: PBBFAC,,,

## 2023-10-30 PROCEDURE — 84443 ASSAY THYROID STIM HORMONE: CPT | Performed by: STUDENT IN AN ORGANIZED HEALTH CARE EDUCATION/TRAINING PROGRAM

## 2023-10-30 PROCEDURE — 82607 VITAMIN B-12: CPT | Performed by: STUDENT IN AN ORGANIZED HEALTH CARE EDUCATION/TRAINING PROGRAM

## 2023-10-30 PROCEDURE — 82652 VIT D 1 25-DIHYDROXY: CPT | Performed by: STUDENT IN AN ORGANIZED HEALTH CARE EDUCATION/TRAINING PROGRAM

## 2023-10-30 PROCEDURE — 3078F PR MOST RECENT DIASTOLIC BLOOD PRESSURE < 80 MM HG: ICD-10-PCS | Mod: CPTII,S$GLB,, | Performed by: PSYCHIATRY & NEUROLOGY

## 2023-10-30 PROCEDURE — 82746 ASSAY OF FOLIC ACID SERUM: CPT | Performed by: STUDENT IN AN ORGANIZED HEALTH CARE EDUCATION/TRAINING PROGRAM

## 2023-10-30 PROCEDURE — 85652 RBC SED RATE AUTOMATED: CPT | Performed by: STUDENT IN AN ORGANIZED HEALTH CARE EDUCATION/TRAINING PROGRAM

## 2023-10-30 PROCEDURE — 84480 ASSAY TRIIODOTHYRONINE (T3): CPT | Performed by: STUDENT IN AN ORGANIZED HEALTH CARE EDUCATION/TRAINING PROGRAM

## 2023-10-30 PROCEDURE — 99204 PR OFFICE/OUTPT VISIT, NEW, LEVL IV, 45-59 MIN: ICD-10-PCS | Mod: S$GLB,,, | Performed by: PSYCHIATRY & NEUROLOGY

## 2023-10-30 PROCEDURE — 99204 OFFICE O/P NEW MOD 45 MIN: CPT | Mod: S$GLB,,, | Performed by: PSYCHIATRY & NEUROLOGY

## 2023-10-30 PROCEDURE — 86592 SYPHILIS TEST NON-TREP QUAL: CPT | Performed by: STUDENT IN AN ORGANIZED HEALTH CARE EDUCATION/TRAINING PROGRAM

## 2023-10-30 RX ORDER — MEMANTINE HYDROCHLORIDE 5 MG/1
5 TABLET ORAL 2 TIMES DAILY
Qty: 60 TABLET | Refills: 3 | Status: SHIPPED | OUTPATIENT
Start: 2023-10-30

## 2023-10-30 NOTE — PROGRESS NOTES
Outpatient Psychiatry Initial Visit (MD/NP)    10/30/2023    Kassandra Rodriguez, a 82 y.o. female, presenting for initial evaluation visit. Met with patient and friend.    Reason for Encounter: Referral from Dr. Jamel Perkins (PCP) . Patient complains of forgetfulness.    History of Present Illness:     Pt presents to appointment accompanied by her close friend, Liberty, who (with pt's permission) provided part of history.   Pt stated that she's been noticing some forgetfulness over the past ~2 years, which appears to have gotten worse of the past few months. Stated that she had discussed her concerns with her PCP as she'd like to get started on medication to help with memory loss, if possible, as she's had several friends who have been diagnosed with dementia and she'd like to prevent what they and their families have had to go through as much as possible. She has never seen a psychiatrist or therapist before, and has never undergone any neuropsychological testing.   She is currently taking amlodipine 5mg daily for HTN, ASA 81mg daily, as well as Vitamin D-calcium, multivitamin w/ iron and folate, and just started on a B12 supplement.   She endorsed frequently forgetting people's names and losing things frequently, spending time every day searching for things she's lost. She endorsed sometimes forgetting where she's going when she's driving, and then remembering a few moments later; denied any car accidents. Endorsed two prior falls during which she hit her head; one was years ago around her house, and the most recent fall was in January 2023 while she was at the [facility] gym watching a basketball game one of her sons was coaching. She stated that she tripped over some bricks and hit her head; was evaluated by her PCP the next day (1/26/23) with imaging ordered.   Pt endorsed hx of being very independent; lives alone and takes care of all ADL's and IADL's. Her children will help as needed (for example, her daughter, who works  "in insurance, helped her obtain car insurance recently). She ambulates independently, has a hearing aid on the L side, and uses reading glasses.   Endorsed occasionally feeling down/depressed and sometimes lonely, however stated that she "(keeps herself) busy." She goes to the gym with Ms. Koenig every morning, she's active in her [employer], and frequently gets involved in various [employer]-related programs. Denied anxiety apart from occasional situational anxiety when dealing with new things/situations. Sleeping well. Drinks a cup of coffee/day and occasionally takes naps. Has good energy. Denied any issues with concentration. Stated that her "appetite is not what is used to be" but does cook and eat regularly, and her weight has been stable.   Denied hx of cierra/hypomania. Denied SI/HI/AVH.   Endorsed hx of some heavy drinking in the distant past; now rarely has a wine cooler. Denied any other substance use. Quit tobacco use in 1983.   Pt completed MoCA (see under "media" tab) and will obtain labs today.   Discussed starting Namenda at 5mg BID for mild neurocognitive disorder.       History:     Allergies:  Penicillins    Past Medical/Surgical History:  Past Medical History:   Diagnosis Date    Hearing loss     Hypertension      Hx of head injury (see HPI). Denied hx of seizures.     Past Surgical History:   Procedure Laterality Date    LAPAROSCOPIC CHOLECYSTECTOMY         Past Psychiatric History:  Previous Medication Trials: no   Previous Psychiatric Hospitalizations: no   Previous Suicide Attempts: no   History of Violence: no  Outpatient Psychiatrist: no  Outpatient Therapist: no  Family History: no    Social History:  Raised mostly by great-aunts; felt she was neglected at times, as they had their own kids, feels "no one was looking out for (her)." Endorsed hx stressful events (possible trauma) with ex-husbands (has been  for years).   Marital Status:   Children: 6 (4 of her kids live around [place], 2 " "in [place])  Employment Status/Info: retired; had worked in a school cafeteria for 27 years  Education: 10th grade  Special Ed: no  Housing Status: lives alone  History of phys/sexual abuse: no  Access to gun: no    Substance Abuse History:  Recreational Drugs:  denied  Use of Alcohol:  endorsed hx of heavier drinking; currently has a wine cooler on rare occasions  Rehab History: no   Tobacco Use: no; quit smoking in 1983  Use of OTC: no    Legal History:  Past Charges/Incarcerations: no   Pending charges: no     Psychosocial Stressors: health    Review Of Systems:     Pertinent items are noted in HPI.    Current Evaluation:     Nutritional Screening: Considering the patient's height and weight, medications, medical history and preferences, should a referral be made to the dietitian? no    Constitutional  Vitals:  Most recent vital signs, dated less than 90 days prior to this appointment, were reviewed.    Vitals:    10/30/23 0945   BP: 128/77   Pulse: 88   Weight: 68.2 kg (150 lb 3.9 oz)   Height: 5' 1" (1.549 m)        General:  unremarkable, age appropriate, casually dressed, neatly groomed     Musculoskeletal  Muscle Strength/Tone:  not examined   Gait & Station:  non-ataxic     Psychiatric  Speech:  no latency; no press   Mood & Affect:  steady  congruent and appropriate   Thought Process:  normal and logical   Associations:  intact   Thought Content:  no suicidality, no homicidality, delusions, or paranoia   Insight:  intact, has awareness of illness   Judgement: behavior is adequate to circumstances   Orientation:  person, place, situation, time/date, day of week, month of year, year   Memory: intact for content of interview (see MoCA results)   Language: grossly intact   Attention Span & Concentration:  able to focus   Fund of Knowledge:  intact and appropriate to age and level of education       Relevant Elements of Neurological Exam: normal gait    Functioning in Relationships:  Spouse/partner: N/A  Peers: " good  Employers: N/A    Laboratory Data  No visits with results within 1 Month(s) from this visit.   Latest known visit with results is:   Lab Visit on 06/07/2023   Component Date Value Ref Range Status    Sodium 06/07/2023 141  136 - 145 mmol/L Final    Potassium 06/07/2023 4.0  3.5 - 5.1 mmol/L Final    Chloride 06/07/2023 105  95 - 110 mmol/L Final    CO2 06/07/2023 27  23 - 29 mmol/L Final    Glucose 06/07/2023 85  70 - 110 mg/dL Final    BUN 06/07/2023 13  8 - 23 mg/dL Final    Creatinine 06/07/2023 0.8  0.5 - 1.4 mg/dL Final    Calcium 06/07/2023 9.5  8.7 - 10.5 mg/dL Final    Anion Gap 06/07/2023 9  8 - 16 mmol/L Final    eGFR 06/07/2023 >60.0  >60 mL/min/1.73 m^2 Final         Medications  Outpatient Encounter Medications as of 10/30/2023   Medication Sig Dispense Refill    acetaminophen (TYLENOL) 500 MG tablet Take 500 mg by mouth every 6 (six) hours as needed for Pain.      amLODIPine (NORVASC) 5 MG tablet Take 1 tablet by mouth once daily 90 tablet 3    aspirin (ECOTRIN) 81 MG EC tablet Take 81 mg by mouth once daily.      calcium-vitamin D 250-100 mg-unit per tablet Take 1 tablet by mouth 2 (two) times daily.      clobetasol 0.05% (TEMOVATE) 0.05 % Oint Apply 1-2 times daily to top of vaginal lips/vulva area (Patient not taking: Reported on 1/3/2023) 30 g 0    conjugated estrogens (PREMARIN) vaginal cream Apply 1 applicator nightly vaginally  x 2 weeks then nightly 3x per week thereafter (Patient not taking: Reported on 1/3/2023) 30 g 5    estradioL (ESTRACE) 0.01 % (0.1 mg/gram) vaginal cream Place 3 g vaginally once daily. 90 g 0    fluticasone propionate (FLONASE) 50 mcg/actuation nasal spray 1 spray (50 mcg total) by Each Nostril route once daily. (Patient not taking: Reported on 1/26/2023) 15.8 mL 0    levocetirizine (XYZAL) 5 MG tablet Take 1 tablet (5 mg total) by mouth every evening. 30 tablet 11    mirabegron (MYRBETRIQ) 25 mg Tb24 ER tablet Take 1 tablet (25 mg total) by mouth once daily.  (Patient not taking: Reported on 7/18/2023) 30 tablet 11    multivit-mins no.63/iron/folic (M-VIT ORAL) Take 1 tablet by mouth.      TURMERIC ROOT EXTRACT ORAL Take by mouth.      vitamin D (VITAMIN D3) 1000 units Tab Take 1,000 Units by mouth once daily.       No facility-administered encounter medications on file as of 10/30/2023.         Assessment - Diagnosis - Goals:     Kassandra Rodriguez, a 82 y.o. female, presenting for initial evaluation visit.     Impression:       ICD-10-CM ICD-9-CM   1. Mild neurocognitive disorder  G31.84 331.83         Treatment Plan/Recommendations:   Start Namenda at 5 mg BID  Labs ordered today: CBC, CMP, TSH, T3, free T4, B12, folate, calcitriol, RPR, ESR  Discussed pt's current driving status; encouraged to discontinue driving if memory issues worsen    Discussed with patient informed consent including diagnosis, risks and benefits of proposed treatment above vs. alternative treatments vs. no treatment, as well as serious and common side effects of these treatments, and the inherent unpredictability of individual responses to these treatments. The patient expresses understanding of the above and displays the capacity to agree with this current plan. Patient also agrees that, currently, the benefits outweigh the risks and would like to pursue treatment at this time, and had no other questions.    Instructions:  Take all medications as prescribed.    Abstain from recreational drugs and alcohol.  Present to ED or call 911 for SI/HI plan or intent, psychosis, or medical emergency.    Return to Clinic: 1 month, or sooner PRN      Case seen and discussed with supervising staff, Dr. Mohit Pacheco MD  [facility], PGY-4

## 2023-11-01 ENCOUNTER — OFFICE VISIT (OUTPATIENT)
Dept: UROLOGY | Facility: CLINIC | Age: 83
End: 2023-11-01
Payer: MEDICARE

## 2023-11-01 VITALS — BODY MASS INDEX: 27.94 KG/M2 | WEIGHT: 148 LBS | HEIGHT: 61 IN

## 2023-11-01 DIAGNOSIS — N89.8 VAGINAL IRRITATION: Primary | ICD-10-CM

## 2023-11-01 DIAGNOSIS — N39.3 STRESS INCONTINENCE: ICD-10-CM

## 2023-11-01 DIAGNOSIS — N95.2 ATROPHIC VAGINITIS: ICD-10-CM

## 2023-11-01 LAB — 1,25(OH)2D3 SERPL-MCNC: 57 PG/ML (ref 20–79)

## 2023-11-01 PROCEDURE — 1160F PR REVIEW ALL MEDS BY PRESCRIBER/CLIN PHARMACIST DOCUMENTED: ICD-10-PCS | Mod: CPTII,S$GLB,, | Performed by: NURSE PRACTITIONER

## 2023-11-01 PROCEDURE — 3288F FALL RISK ASSESSMENT DOCD: CPT | Mod: CPTII,S$GLB,, | Performed by: NURSE PRACTITIONER

## 2023-11-01 PROCEDURE — 99999 PR PBB SHADOW E&M-EST. PATIENT-LVL III: CPT | Mod: PBBFAC,,, | Performed by: NURSE PRACTITIONER

## 2023-11-01 PROCEDURE — 3288F PR FALLS RISK ASSESSMENT DOCUMENTED: ICD-10-PCS | Mod: CPTII,S$GLB,, | Performed by: NURSE PRACTITIONER

## 2023-11-01 PROCEDURE — 1159F PR MEDICATION LIST DOCUMENTED IN MEDICAL RECORD: ICD-10-PCS | Mod: CPTII,S$GLB,, | Performed by: NURSE PRACTITIONER

## 2023-11-01 PROCEDURE — 1101F PT FALLS ASSESS-DOCD LE1/YR: CPT | Mod: CPTII,S$GLB,, | Performed by: NURSE PRACTITIONER

## 2023-11-01 PROCEDURE — 1160F RVW MEDS BY RX/DR IN RCRD: CPT | Mod: CPTII,S$GLB,, | Performed by: NURSE PRACTITIONER

## 2023-11-01 PROCEDURE — 1101F PR PT FALLS ASSESS DOC 0-1 FALLS W/OUT INJ PAST YR: ICD-10-PCS | Mod: CPTII,S$GLB,, | Performed by: NURSE PRACTITIONER

## 2023-11-01 PROCEDURE — 99999 PR PBB SHADOW E&M-EST. PATIENT-LVL III: ICD-10-PCS | Mod: PBBFAC,,, | Performed by: NURSE PRACTITIONER

## 2023-11-01 PROCEDURE — 99214 PR OFFICE/OUTPT VISIT, EST, LEVL IV, 30-39 MIN: ICD-10-PCS | Mod: S$GLB,,, | Performed by: NURSE PRACTITIONER

## 2023-11-01 PROCEDURE — 1159F MED LIST DOCD IN RCRD: CPT | Mod: CPTII,S$GLB,, | Performed by: NURSE PRACTITIONER

## 2023-11-01 PROCEDURE — 99214 OFFICE O/P EST MOD 30 MIN: CPT | Mod: S$GLB,,, | Performed by: NURSE PRACTITIONER

## 2023-11-01 PROCEDURE — 81514 NFCT DS BV&VAGINITIS DNA ALG: CPT | Performed by: NURSE PRACTITIONER

## 2023-11-01 RX ORDER — ESTRADIOL 0.1 MG/G
3 CREAM VAGINAL DAILY
Qty: 90 G | Refills: 1 | Status: SHIPPED | OUTPATIENT
Start: 2023-11-01 | End: 2024-02-19 | Stop reason: SDUPTHER

## 2023-11-01 RX ORDER — MIRABEGRON 25 MG/1
25 TABLET, FILM COATED, EXTENDED RELEASE ORAL DAILY
Qty: 30 TABLET | Refills: 11 | Status: SHIPPED | OUTPATIENT
Start: 2023-11-01 | End: 2024-10-31

## 2023-11-01 NOTE — PROGRESS NOTES
"CHIEF COMPLAINT:    Mrs. Rodriguez is a 82 y.o. female presenting for stress incontinence follow up.    .  PRESENTING ILLNESS:    Kassandra Rodriguez is a 82 y.o. female with a PMH of hx guillain-barre, htn, atrophic vaginitis who presents for stress incontinence follow up.     Established patient to urology department.  Presents today as yearly follow up.  Last seen in urology 10/2022 for stress incontinence. Prescribed myrbetriq and referred to pelvic floor therapy.  Tried the myrbetriq which helped, however she prefers not to take it.  Completed pelvic floor therapy which was more beneficial.  Today states not taking myrbetriq and not practicing pelvic floor exercising.  Mentions wanting a "bladder lift surgery."  Discussed trying medications and restarting pelvic floor exercises.  If no improvement with these measures can discuss with pelvic  at follow up.    Of note patient complaining of rash and irritation around vagina.  Has been applying Vaseline to area without relief.  Believes she may have a yeast infection.       REVIEW OF SYSTEMS:    Review of Systems   Constitutional:  Negative for chills and fever.   Respiratory:  Negative for shortness of breath.    Cardiovascular:  Negative for chest pain.   Gastrointestinal:  Negative for constipation and diarrhea.   Genitourinary:  Negative for dysuria, flank pain, frequency, hematuria and urgency.   Neurological:  Negative for dizziness and weakness.        PATIENT HISTORY:    Past Medical History:   Diagnosis Date    Hearing loss     Hypertension        Family History   Problem Relation Age of Onset    Diabetes Daughter     Cancer Daughter     Hypertension Daughter     Hypertension Son     Diabetes Son     Diabetes Son     No Known Problems Son        Allergies:  Penicillins    Medications:    Current Outpatient Medications:     acetaminophen (TYLENOL) 500 MG tablet, Take 500 mg by mouth every 6 (six) hours as needed for Pain., Disp: , Rfl:     amLODIPine " (NORVASC) 5 MG tablet, Take 1 tablet by mouth once daily, Disp: 90 tablet, Rfl: 3    aspirin (ECOTRIN) 81 MG EC tablet, Take 81 mg by mouth once daily., Disp: , Rfl:     calcium-vitamin D 250-100 mg-unit per tablet, Take 1 tablet by mouth 2 (two) times daily., Disp: , Rfl:     clobetasol 0.05% (TEMOVATE) 0.05 % Oint, Apply 1-2 times daily to top of vaginal lips/vulva area (Patient not taking: Reported on 1/3/2023), Disp: 30 g, Rfl: 0    conjugated estrogens (PREMARIN) vaginal cream, Apply 1 applicator nightly vaginally  x 2 weeks then nightly 3x per week thereafter (Patient not taking: Reported on 1/3/2023), Disp: 30 g, Rfl: 5    estradioL (ESTRACE) 0.01 % (0.1 mg/gram) vaginal cream, Place 3 g vaginally once daily., Disp: 90 g, Rfl: 0    fluticasone propionate (FLONASE) 50 mcg/actuation nasal spray, 1 spray (50 mcg total) by Each Nostril route once daily. (Patient not taking: Reported on 1/26/2023), Disp: 15.8 mL, Rfl: 0    levocetirizine (XYZAL) 5 MG tablet, Take 1 tablet (5 mg total) by mouth every evening., Disp: 30 tablet, Rfl: 11    memantine (NAMENDA) 5 MG Tab, Take 1 tablet (5 mg total) by mouth 2 (two) times daily., Disp: 60 tablet, Rfl: 3    mirabegron (MYRBETRIQ) 25 mg Tb24 ER tablet, Take 1 tablet (25 mg total) by mouth once daily. (Patient not taking: Reported on 7/18/2023), Disp: 30 tablet, Rfl: 11    multivit-mins no.63/iron/folic (M-VIT ORAL), Take 1 tablet by mouth., Disp: , Rfl:     TURMERIC ROOT EXTRACT ORAL, Take by mouth., Disp: , Rfl:     vitamin D (VITAMIN D3) 1000 units Tab, Take 1,000 Units by mouth once daily., Disp: , Rfl:     PHYSICAL EXAMINATION:    Physical Exam  Vitals and nursing note reviewed.   Constitutional:       Appearance: Normal appearance. She is well-developed.   HENT:      Head: Normocephalic and atraumatic.   Eyes:      Pupils: Pupils are equal, round, and reactive to light.   Pulmonary:      Effort: Pulmonary effort is normal.   Genitourinary:     General: Normal vulva.       Exam position: Supine.      Vagina: Prolapsed vaginal walls present. No tenderness.      Comments: Prolapse noted without organ protrusion   Musculoskeletal:         General: Normal range of motion.      Cervical back: Normal range of motion.   Skin:     General: Skin is warm and dry.   Neurological:      Mental Status: She is alert and oriented to person, place, and time.   Psychiatric:         Behavior: Behavior normal.           LABS:      IMPRESSION:    No diagnosis found.        PLAN:    Problem List Items Addressed This Visit    None        1. Stress incontinence   - encourage to take myrbetriq   - restart performing pelvic floor exercises  2. Vaginal atrophy/dryness   Discussed using estrace cream 3 x weekly at night to aid with irritation and dryness, script sent to pharmacy    4. rtc in 3 mths for f/u with Dr. Rosina Corral, JOJO          I spent 30 minutes with the patient. Over 50% of the visit was spent in counseling.

## 2023-11-03 ENCOUNTER — TELEPHONE (OUTPATIENT)
Dept: UROLOGY | Facility: CLINIC | Age: 83
End: 2023-11-03
Payer: MEDICARE

## 2023-11-03 NOTE — TELEPHONE ENCOUNTER
----- Message from Cindy Corral NP sent at 11/3/2023  8:42 AM CDT -----  Please notify Ms. Rodriguez swab is negative for yeast infection.

## 2023-12-15 ENCOUNTER — LAB VISIT (OUTPATIENT)
Dept: LAB | Facility: HOSPITAL | Age: 83
End: 2023-12-15
Attending: INTERNAL MEDICINE
Payer: MEDICARE

## 2023-12-15 DIAGNOSIS — I10 ESSENTIAL HYPERTENSION: ICD-10-CM

## 2023-12-15 LAB
ANION GAP SERPL CALC-SCNC: 6 MMOL/L (ref 8–16)
BUN SERPL-MCNC: 11 MG/DL (ref 8–23)
CALCIUM SERPL-MCNC: 9.2 MG/DL (ref 8.7–10.5)
CHLORIDE SERPL-SCNC: 108 MMOL/L (ref 95–110)
CO2 SERPL-SCNC: 27 MMOL/L (ref 23–29)
CREAT SERPL-MCNC: 0.8 MG/DL (ref 0.5–1.4)
EST. GFR  (NO RACE VARIABLE): >60 ML/MIN/1.73 M^2
GLUCOSE SERPL-MCNC: 93 MG/DL (ref 70–110)
POTASSIUM SERPL-SCNC: 4.1 MMOL/L (ref 3.5–5.1)
SODIUM SERPL-SCNC: 141 MMOL/L (ref 136–145)

## 2023-12-15 PROCEDURE — 36415 COLL VENOUS BLD VENIPUNCTURE: CPT | Mod: PO | Performed by: INTERNAL MEDICINE

## 2023-12-15 PROCEDURE — 80048 BASIC METABOLIC PNL TOTAL CA: CPT | Performed by: INTERNAL MEDICINE

## 2023-12-15 NOTE — PROGRESS NOTES
Staff Note:     Pt interviewed and case discussed.  I agree with Resident's findings and treatment plan.  Historically high-functioning Pt is self referred to this clinic with c/o recent decline in memory and cognition (MOCA 23/30).  Impairment at present is minimal.  Lab was ordered and a trial of Namenda was begun with Pt's approval.    MICHAEL

## 2023-12-15 NOTE — PROGRESS NOTES
"  Assessment:         1. Essential hypertension    2. Atrophic vaginitis    3. Osteopenia, unspecified location    4. Post-menopausal          Plan:           Kassandra BOBO was seen today for follow-up.    Diagnoses and all orders for this visit:    Essential hypertension  Chronic  Controlled  Patient is at goal today   I have reviewed lifestyle modification to achieve/maintain goals  We will continue the current medication regimen as listed below  Patient will follow up in 6 months   Atrophic vaginitis    Osteopenia, unspecified location  -     DXA Bone Density Axial Skeleton 1 or more sites; Future    Post-menopausal  -     DXA Bone Density Axial Skeleton 1 or more sites; Future              Subjective:       Patient ID: Kassandra Rodriguez is a 83 y.o. female.    Chief Complaint: Follow-up          Interim Hx  Seen by psych  Seen by OBGYN  Labs staby  Concerns today      Chronic problems       Hypertension  This is a chronic problem. The current episode started more than 1 year ago. The problem has been waxing and waning since onset. Past treatments include calcium channel blockers.       Review of Systems   All other systems reviewed and are negative.            Health Maintenance Due   Topic Date Due    RSV Vaccine (Age 60+ and Pregnant patients) (1 - 1-dose 60+ series) Never done    DEXA Scan  08/14/2023    Influenza Vaccine (1) 09/01/2023         Objective:     /60 (BP Location: Right arm, Patient Position: Sitting, BP Method: Large (Manual))   Pulse 96   Ht 5' 1" (1.549 m)   Wt 67.7 kg (149 lb 4 oz)   LMP  (LMP Unknown)   SpO2 100%   BMI 28.20 kg/m²         12/18/2023     9:35 AM 11/1/2023     9:56 AM 10/30/2023     9:45 AM 7/18/2023    10:58 AM 6/16/2023     3:45 PM   Vitals   Height 5' 1" (1.549 m) 5' 1" (1.549 m) 5' 1" (1.549 m) 5' 1" (1.549 m) 5' 1" (1.549 m)   Weight (lbs) 149.25 148 150.24 149.03 149.69   BMI (kg/m2) 28.2 28 28.4 28.2 28.3                Physical Exam  Vitals and nursing note reviewed. "   Constitutional:       General: She is not in acute distress.     Appearance: She is well-developed. She is not diaphoretic.   HENT:      Head: Normocephalic.      Nose: Nose normal.   Eyes:      General:         Right eye: No discharge.         Left eye: No discharge.      Conjunctiva/sclera: Conjunctivae normal.      Pupils: Pupils are equal, round, and reactive to light.   Cardiovascular:      Rate and Rhythm: Normal rate and regular rhythm.      Heart sounds: Normal heart sounds. No murmur heard.     No friction rub. No gallop.   Pulmonary:      Effort: Pulmonary effort is normal. No respiratory distress.   Abdominal:      General: Bowel sounds are normal. There is no distension.      Palpations: Abdomen is soft.   Musculoskeletal:         General: No deformity. Normal range of motion.      Cervical back: Normal range of motion.   Skin:     General: Skin is warm.   Neurological:      Mental Status: She is alert and oriented to person, place, and time.      Cranial Nerves: No cranial nerve deficit.           Recent Results (from the past 336 hour(s))   Basic Metabolic Panel    Collection Time: 12/15/23  7:26 AM   Result Value Ref Range    Sodium 141 136 - 145 mmol/L    Potassium 4.1 3.5 - 5.1 mmol/L    Chloride 108 95 - 110 mmol/L    CO2 27 23 - 29 mmol/L    Glucose 93 70 - 110 mg/dL    BUN 11 8 - 23 mg/dL    Creatinine 0.8 0.5 - 1.4 mg/dL    Calcium 9.2 8.7 - 10.5 mg/dL    Anion Gap 6 (L) 8 - 16 mmol/L    eGFR >60.0 >60 mL/min/1.73 m^2       Future Appointments   Date Time Provider Department Center   1/8/2024 10:40 AM [facility] DEXA1 LIMIT 350 LBS [facility] BMD [facility]   2/2/2024  9:40 AM Lluvia Almaguer MD [facility] UROLOGY [facility]   2/2/2024 10:00 AM [facility] PSYCHIATRY, GERIATRIC CLINIC 2 [facility] PSYCH [facility]   6/18/2024  9:00 AM Mesfin Perkins III, MD [facility]         Medication List with Changes/Refills   Current Medications    ACETAMINOPHEN (TYLENOL) 500 MG TABLET    Take 500 mg by mouth every 6 (six)  hours as needed for Pain.    AMLODIPINE (NORVASC) 5 MG TABLET    Take 1 tablet by mouth once daily    ASPIRIN (ECOTRIN) 81 MG EC TABLET    Take 81 mg by mouth once daily.    CALCIUM-VITAMIN D 250-100 MG-UNIT PER TABLET    Take 1 tablet by mouth 2 (two) times daily.    CLOBETASOL 0.05% (TEMOVATE) 0.05 % OINT    Apply 1-2 times daily to top of vaginal lips/vulva area    ESTRADIOL (ESTRACE) 0.01 % (0.1 MG/GRAM) VAGINAL CREAM    Place 3 g vaginally once daily.    FLUTICASONE PROPIONATE (FLONASE) 50 MCG/ACTUATION NASAL SPRAY    1 spray (50 mcg total) by Each Nostril route once daily.    LEVOCETIRIZINE (XYZAL) 5 MG TABLET    Take 1 tablet (5 mg total) by mouth every evening.    MEMANTINE (NAMENDA) 5 MG TAB    Take 1 tablet (5 mg total) by mouth 2 (two) times daily.    MIRABEGRON (MYRBETRIQ) 25 MG TB24 ER TABLET    Take 1 tablet (25 mg total) by mouth once daily.    MULTIVIT-MINS NO.63/IRON/FOLIC (M-VIT ORAL)    Take 1 tablet by mouth.    TURMERIC ROOT EXTRACT ORAL    Take by mouth.    VITAMIN D (VITAMIN D3) 1000 UNITS TAB    Take 1,000 Units by mouth once daily.         Disclaimer:  This note has been generated using voice-recognition software. There may be grammatical or spelling errors that have been missed during proof-reading

## 2023-12-18 ENCOUNTER — OFFICE VISIT (OUTPATIENT)
Dept: INTERNAL MEDICINE | Facility: CLINIC | Age: 83
End: 2023-12-18
Payer: MEDICARE

## 2023-12-18 VITALS
OXYGEN SATURATION: 100 % | DIASTOLIC BLOOD PRESSURE: 60 MMHG | SYSTOLIC BLOOD PRESSURE: 102 MMHG | HEART RATE: 96 BPM | BODY MASS INDEX: 28.18 KG/M2 | HEIGHT: 61 IN | WEIGHT: 149.25 LBS

## 2023-12-18 DIAGNOSIS — M85.80 OSTEOPENIA, UNSPECIFIED LOCATION: ICD-10-CM

## 2023-12-18 DIAGNOSIS — I10 ESSENTIAL HYPERTENSION: Primary | ICD-10-CM

## 2023-12-18 DIAGNOSIS — Z78.0 POST-MENOPAUSAL: ICD-10-CM

## 2023-12-18 DIAGNOSIS — N95.2 ATROPHIC VAGINITIS: ICD-10-CM

## 2023-12-18 PROCEDURE — 1101F PT FALLS ASSESS-DOCD LE1/YR: CPT | Mod: CPTII,S$GLB,, | Performed by: INTERNAL MEDICINE

## 2023-12-18 PROCEDURE — 3288F FALL RISK ASSESSMENT DOCD: CPT | Mod: CPTII,S$GLB,, | Performed by: INTERNAL MEDICINE

## 2023-12-18 PROCEDURE — 3078F DIAST BP <80 MM HG: CPT | Mod: CPTII,S$GLB,, | Performed by: INTERNAL MEDICINE

## 2023-12-18 PROCEDURE — 99214 OFFICE O/P EST MOD 30 MIN: CPT | Mod: S$GLB,,, | Performed by: INTERNAL MEDICINE

## 2023-12-18 PROCEDURE — 3074F SYST BP LT 130 MM HG: CPT | Mod: CPTII,S$GLB,, | Performed by: INTERNAL MEDICINE

## 2023-12-18 PROCEDURE — 1159F MED LIST DOCD IN RCRD: CPT | Mod: CPTII,S$GLB,, | Performed by: INTERNAL MEDICINE

## 2023-12-18 PROCEDURE — 1159F PR MEDICATION LIST DOCUMENTED IN MEDICAL RECORD: ICD-10-PCS | Mod: CPTII,S$GLB,, | Performed by: INTERNAL MEDICINE

## 2023-12-18 PROCEDURE — 1126F PR PAIN SEVERITY QUANTIFIED, NO PAIN PRESENT: ICD-10-PCS | Mod: CPTII,S$GLB,, | Performed by: INTERNAL MEDICINE

## 2023-12-18 PROCEDURE — 1160F PR REVIEW ALL MEDS BY PRESCRIBER/CLIN PHARMACIST DOCUMENTED: ICD-10-PCS | Mod: CPTII,S$GLB,, | Performed by: INTERNAL MEDICINE

## 2023-12-18 PROCEDURE — 1101F PR PT FALLS ASSESS DOC 0-1 FALLS W/OUT INJ PAST YR: ICD-10-PCS | Mod: CPTII,S$GLB,, | Performed by: INTERNAL MEDICINE

## 2023-12-18 PROCEDURE — 3078F PR MOST RECENT DIASTOLIC BLOOD PRESSURE < 80 MM HG: ICD-10-PCS | Mod: CPTII,S$GLB,, | Performed by: INTERNAL MEDICINE

## 2023-12-18 PROCEDURE — 3288F PR FALLS RISK ASSESSMENT DOCUMENTED: ICD-10-PCS | Mod: CPTII,S$GLB,, | Performed by: INTERNAL MEDICINE

## 2023-12-18 PROCEDURE — 1126F AMNT PAIN NOTED NONE PRSNT: CPT | Mod: CPTII,S$GLB,, | Performed by: INTERNAL MEDICINE

## 2023-12-18 PROCEDURE — 99999 PR PBB SHADOW E&M-EST. PATIENT-LVL IV: ICD-10-PCS | Mod: PBBFAC,,, | Performed by: INTERNAL MEDICINE

## 2023-12-18 PROCEDURE — 3074F PR MOST RECENT SYSTOLIC BLOOD PRESSURE < 130 MM HG: ICD-10-PCS | Mod: CPTII,S$GLB,, | Performed by: INTERNAL MEDICINE

## 2023-12-18 PROCEDURE — 99214 PR OFFICE/OUTPT VISIT, EST, LEVL IV, 30-39 MIN: ICD-10-PCS | Mod: S$GLB,,, | Performed by: INTERNAL MEDICINE

## 2023-12-18 PROCEDURE — 1160F RVW MEDS BY RX/DR IN RCRD: CPT | Mod: CPTII,S$GLB,, | Performed by: INTERNAL MEDICINE

## 2023-12-18 PROCEDURE — 99999 PR PBB SHADOW E&M-EST. PATIENT-LVL IV: CPT | Mod: PBBFAC,,, | Performed by: INTERNAL MEDICINE

## 2024-01-08 ENCOUNTER — HOSPITAL ENCOUNTER (OUTPATIENT)
Dept: RADIOLOGY | Facility: HOSPITAL | Age: 84
Discharge: HOME | End: 2024-01-08
Attending: INTERNAL MEDICINE
Payer: MEDICARE

## 2024-01-08 DIAGNOSIS — Z78.0 POST-MENOPAUSAL: ICD-10-CM

## 2024-01-08 DIAGNOSIS — M85.80 OSTEOPENIA, UNSPECIFIED LOCATION: ICD-10-CM

## 2024-01-08 PROCEDURE — 77080 DXA BONE DENSITY AXIAL: CPT | Mod: 26,,, | Performed by: RADIOLOGY

## 2024-01-08 PROCEDURE — 77080 DXA BONE DENSITY AXIAL: CPT | Mod: TC

## 2024-01-08 NOTE — PROGRESS NOTES
Can you please call the patient about the normal results.  ----      Greetings  I have reviewed the results of your imaging test. You do have some low bone mineral density . We call this osteopenia. Please follow the recommendation below     1. Diet - please get the recommended amount of calcium and vitamin D  - Calcium 500-1000mg /day   - Vitamin D - 800 U daily     2. Please get plenty of exercise   _____________________     The patient does not have the portal. Thanks!

## 2024-01-09 ENCOUNTER — TELEPHONE (OUTPATIENT)
Dept: INTERNAL MEDICINE | Facility: CLINIC | Age: 84
End: 2024-01-09
Payer: MEDICARE

## 2024-01-09 NOTE — TELEPHONE ENCOUNTER
----- Message from Mesfin Perkins III, MD sent at 1/8/2024  4:40 PM CST -----  Can you please call the patient about the normal results.  ----      Greetings  I have reviewed the results of your imaging test. You do have some low bone mineral density . We call this osteopenia. Please follow the recommendation below     1. Diet - please get the recommended amount of calcium and vitamin D  - Calcium 500-1000mg /day   - Vitamin D - 800 U daily     2. Please get plenty of exercise   _____________________     The patient does not have the portal. Thanks!

## 2024-02-02 ENCOUNTER — OFFICE VISIT (OUTPATIENT)
Dept: UROLOGY | Facility: CLINIC | Age: 84
End: 2024-02-02
Payer: MEDICARE

## 2024-02-02 VITALS
SYSTOLIC BLOOD PRESSURE: 148 MMHG | WEIGHT: 152.13 LBS | BODY MASS INDEX: 28.72 KG/M2 | DIASTOLIC BLOOD PRESSURE: 80 MMHG | HEIGHT: 61 IN | HEART RATE: 92 BPM

## 2024-02-02 DIAGNOSIS — N39.46 MIXED INCONTINENCE: Primary | ICD-10-CM

## 2024-02-02 PROCEDURE — 81002 URINALYSIS NONAUTO W/O SCOPE: CPT | Mod: S$GLB,,, | Performed by: UROLOGY

## 2024-02-02 PROCEDURE — 51701 INSERT BLADDER CATHETER: CPT | Mod: S$GLB,,, | Performed by: UROLOGY

## 2024-02-02 PROCEDURE — 99999 PR PBB SHADOW E&M-EST. PATIENT-LVL III: CPT | Mod: PBBFAC,,, | Performed by: UROLOGY

## 2024-02-02 PROCEDURE — 99215 OFFICE O/P EST HI 40 MIN: CPT | Mod: 25,S$GLB,, | Performed by: UROLOGY

## 2024-02-02 RX ORDER — LIDOCAINE HYDROCHLORIDE 20 MG/ML
JELLY TOPICAL ONCE
Status: CANCELLED | OUTPATIENT
Start: 2024-02-02 | End: 2024-02-02

## 2024-02-02 RX ORDER — DOXYCYCLINE HYCLATE 100 MG
100 TABLET ORAL ONCE
Status: CANCELLED | OUTPATIENT
Start: 2024-02-02 | End: 2024-02-02

## 2024-02-19 DIAGNOSIS — N95.2 ATROPHIC VAGINITIS: ICD-10-CM

## 2024-02-19 RX ORDER — ESTRADIOL 0.1 MG/G
1 CREAM VAGINAL DAILY
Qty: 42.5 G | Refills: 3 | Status: SHIPPED | OUTPATIENT
Start: 2024-02-19

## 2024-02-19 NOTE — TELEPHONE ENCOUNTER
----- Message from Nasim Castillo sent at 2/19/2024 12:05 PM CST -----  Regarding: Medication concerns  Contact: @ [phone]  Rx Refill/Request           Is this a Refill or New Rx:  Refill           Rx Name and Strength:  estradioL (ESTRACE) 0.01 % (0.1 mg/gram) vaginal cream          Preferred Pharmacy: Pt states that she doesn't know where the provider wanted it to be sent to.           Communication Preference: call back today            Additional Information: Pt states that the provider told her that she could get it for her cheaper but the pt never heard back and would like to know because she is running out

## 2024-03-21 ENCOUNTER — PROCEDURE VISIT (OUTPATIENT)
Dept: UROLOGY | Facility: CLINIC | Age: 84
End: 2024-03-21
Payer: MEDICARE

## 2024-03-21 VITALS
HEART RATE: 86 BPM | SYSTOLIC BLOOD PRESSURE: 152 MMHG | HEIGHT: 61 IN | BODY MASS INDEX: 28.35 KG/M2 | DIASTOLIC BLOOD PRESSURE: 84 MMHG | WEIGHT: 150.13 LBS | TEMPERATURE: 98 F | RESPIRATION RATE: 18 BRPM

## 2024-03-21 DIAGNOSIS — N39.46 MIXED INCONTINENCE: ICD-10-CM

## 2024-03-21 PROCEDURE — 51741 ELECTRO-UROFLOWMETRY FIRST: CPT | Mod: 26,51,S$GLB, | Performed by: UROLOGY

## 2024-03-21 PROCEDURE — 52000 CYSTOURETHROSCOPY: CPT | Mod: 59,S$GLB,, | Performed by: UROLOGY

## 2024-03-21 PROCEDURE — 51784 ANAL/URINARY MUSCLE STUDY: CPT | Mod: 26,51,S$GLB, | Performed by: UROLOGY

## 2024-03-21 PROCEDURE — 51797 INTRAABDOMINAL PRESSURE TEST: CPT | Mod: 26,S$GLB,, | Performed by: UROLOGY

## 2024-03-21 PROCEDURE — 51728 CYSTOMETROGRAM W/VP: CPT | Mod: 26,S$GLB,, | Performed by: UROLOGY

## 2024-03-21 RX ORDER — DOXYCYCLINE HYCLATE 100 MG
100 TABLET ORAL ONCE
Status: COMPLETED | OUTPATIENT
Start: 2024-03-21 | End: 2024-03-21

## 2024-03-21 RX ORDER — LIDOCAINE HYDROCHLORIDE 20 MG/ML
JELLY TOPICAL ONCE
Status: COMPLETED | OUTPATIENT
Start: 2024-03-21 | End: 2024-03-21

## 2024-03-21 RX ADMIN — Medication 100 MG: at 09:03

## 2024-03-21 RX ADMIN — LIDOCAINE HYDROCHLORIDE: 20 JELLY TOPICAL at 09:03

## 2024-03-21 NOTE — PATIENT INSTRUCTIONS
SIMPLE URODYNAMIC STUDY (SUDS) & CYSTOSCOPY  UROLOGY CLINIC DISCHARGE INSTRUCTIONS    You have had a procedure that will require time to properly heal. Follow the instructions you have been given on how to care for yourself once you are home. Below is additional information to help in your recovery.    ACTIVITY  There are no restrictions in activity. Start doing again the things you did before the procedure.  You may experience a slight burning sensation. You may notice a small amount of blood in your urine. This will clear up within a day. Call the clinic if this continues beyond 48 hours.    DIET  Continue your normal diet. You may eat the same foods you ate before your procedure.  Drink plenty of fluids during the first 24-48 hours following your procedure.    MEDICATIONS  Resume all other previous medications from your prescribing physician.  Continue any pre=procedure antibiotics until they are all gone.    SIGNS AND SYMPTOMS TO REPORT TO THE DOCTOR  Chills or fever greater than 101° F within 24 hours of procedure.  Changes in urination, such as increased bleeding, foul smell, cloudy urine, or painful urination.  Call your doctor with any questions or concerns.    For any emergency situation, call 911 immediately or go to your nearest emergency room.    [facility]  [phone]

## 2024-03-22 NOTE — PROCEDURES
Procedures    Urodynamic Report    Indication:  mixed incontinence    Patient was taken to the Urodynamic Suite where a time out was performed.  She was asked to perform a free uroflow.  Next, the patient was prepped and the urinary residual was drained with a 14 Fr catheter.  A 7 Fr dual lumen catheter was placed to measure intravesical pressures.  A 10 Fr balloon manometer was placed into the rectum for abdominal pressure measurements.  Patch EMG electrodes were placed on the perineum.  The patient was connected to the [facility] Urodynamic machine, using a multichannel technique, the data were interpreted.  The bladder was filled with sterile water at room temperature at a rate of 30 ml/min.  Patient is filled to urgency.  Filling is performed with the patient in the seated position.  Abdominal leak point pressures are checked at 1st desire, then serially at 50cc increments first with Valsalva then with coughing.  The patient was then asked to sit and void for a pressure flow study.    The following are the results of the study:  1.  Uroflow       Q max:  11.7 ml/sec       Voided volume:  98 ml       Pattern of the curve:  continuous    2.  PVR:  53 ml    3.  CMG       Sensation:         First Desire:  170 ml         Normal Desire:  271 ml          Strong Desire:  357 ml         Urgency:  400 ml       Capacity:           Abnormal Contractions:       Compliance:    4.  Abdominal Leak Point Pressure:       Valsalva:  13 cm H2O at 171 ml infused       Cough:  31 cm H2O at 171 ml infused    5.  EMG:  normal guarding reflex, relaxed with voiding    6.  Voiding phase       Q max:  17.5 ml/sec       P det at Q max:  11 cm H2O       Pattern of the curve:  intermittent with the sudeep well above the baseline       Voided volume:  300 ml       PVR:  35 ml calculated, 100 ml inputed    7.  Analysis:  normal sensation, capacity, stress incontinence with low leak point pressure, empties relatively well.     8.   Recommendations:       a.  Discussed the range of therapies.  PT, continence pessary, urethral bulking agent and sling.  Recommended urethral bulking agent, given her ability to empty        b.   She would like to think about it.  IUGA sheet and brochure provided for [employer].  Lluvia Matias's card provided ()       c.  She will let me know what she decides.     CYSTOSCOPY REPORT    Pre Procedure Diagnosis:  mixed incontinence    Post Procedure Diagnosis:  normal lower urinary tract    Anesthesia: 10 cc 2% lidocaine jelly applied per urethra.    14 FR Flexible Olympus cystoscope used.    FINDINGS:  Dome, anterior, posterior, lateral walls and bladder base free of urothelial abnormalities. Right and left ureteral orifices in the normal postion and configuration, both effluxed clear urine.  Bladder neck and urethra were normal.    Specimen:  none    The patient was taken to the cystoscopy suite and placed in dorsal lithotomy position.  The genitalia was prepped and draped  in the usual sterile fashion.  Time out was performed.  Two percent lidocaine jelly was inserted in the urethra.  After sufficent time had passed to allow good local anesthesia, the cystoscope was inserted in the urethra and passed into the bladder visualizing the urethra along its entire course.  The dome, anterior, posterior and lateral walls were examined systematically.  The ureteral orifices were in their usual position and configuration.  The cystoscope was turned upon itself 180 degrees to visualize the bladder neck.  The cystoscope was then brought to the level of the bladder neck, the water was turned on and the urethra was visualized.  The cystoscope was removed and the patient was instructed to urinate prior to leaving the office.     Post procedure medication:  doxycycline 100 mg x 1     ASSESSMENT/PLAN:  83 year old woman status post flexible cystoscopy.  1. Push fluids for 24 hours.  2. May see blood in the urine, this should  gradually improve over the next 2-3 days.  3. The patient was instructed to return to the office or go to the emergency should fever, chills, cloudy urine, or inability to urinate develop.  4. Follow up as above.

## 2024-06-18 ENCOUNTER — OFFICE VISIT (OUTPATIENT)
Dept: INTERNAL MEDICINE | Facility: CLINIC | Age: 84
End: 2024-06-18
Payer: MEDICARE

## 2024-06-18 VITALS
HEIGHT: 61 IN | DIASTOLIC BLOOD PRESSURE: 72 MMHG | HEART RATE: 79 BPM | WEIGHT: 149.69 LBS | OXYGEN SATURATION: 99 % | BODY MASS INDEX: 28.26 KG/M2 | SYSTOLIC BLOOD PRESSURE: 124 MMHG

## 2024-06-18 DIAGNOSIS — Z11.59 ENCOUNTER FOR HEPATITIS C SCREENING TEST FOR LOW RISK PATIENT: ICD-10-CM

## 2024-06-18 DIAGNOSIS — I10 ESSENTIAL HYPERTENSION: Primary | ICD-10-CM

## 2024-06-18 DIAGNOSIS — I70.0 AORTIC ATHEROSCLEROSIS: ICD-10-CM

## 2024-06-18 PROCEDURE — 1159F MED LIST DOCD IN RCRD: CPT | Mod: CPTII,S$GLB,, | Performed by: INTERNAL MEDICINE

## 2024-06-18 PROCEDURE — 99999 PR PBB SHADOW E&M-EST. PATIENT-LVL IV: CPT | Mod: PBBFAC,,, | Performed by: INTERNAL MEDICINE

## 2024-06-18 PROCEDURE — 1126F AMNT PAIN NOTED NONE PRSNT: CPT | Mod: CPTII,S$GLB,, | Performed by: INTERNAL MEDICINE

## 2024-06-18 PROCEDURE — 1160F RVW MEDS BY RX/DR IN RCRD: CPT | Mod: CPTII,S$GLB,, | Performed by: INTERNAL MEDICINE

## 2024-06-18 PROCEDURE — 1158F ADVNC CARE PLAN TLK DOCD: CPT | Mod: CPTII,S$GLB,, | Performed by: INTERNAL MEDICINE

## 2024-06-18 PROCEDURE — 3078F DIAST BP <80 MM HG: CPT | Mod: CPTII,S$GLB,, | Performed by: INTERNAL MEDICINE

## 2024-06-18 PROCEDURE — 1101F PT FALLS ASSESS-DOCD LE1/YR: CPT | Mod: CPTII,S$GLB,, | Performed by: INTERNAL MEDICINE

## 2024-06-18 PROCEDURE — G2211 COMPLEX E/M VISIT ADD ON: HCPCS | Mod: S$GLB,,, | Performed by: INTERNAL MEDICINE

## 2024-06-18 PROCEDURE — 3288F FALL RISK ASSESSMENT DOCD: CPT | Mod: CPTII,S$GLB,, | Performed by: INTERNAL MEDICINE

## 2024-06-18 PROCEDURE — 3074F SYST BP LT 130 MM HG: CPT | Mod: CPTII,S$GLB,, | Performed by: INTERNAL MEDICINE

## 2024-06-18 PROCEDURE — 99214 OFFICE O/P EST MOD 30 MIN: CPT | Mod: S$GLB,,, | Performed by: INTERNAL MEDICINE

## 2024-06-18 NOTE — PROGRESS NOTES
"  Assessment:         1. Aortic atherosclerosis          Plan:           Diagnoses and all orders for this visit:    Aortic atherosclerosis  Chronic  Controlled  Patient is at goal today   I have reviewed lifestyle modification to achieve/maintain goals  Patient will follow up in 1 year or sooner if needed       Diagnoses and all orders for this visit:    Aortic atherosclerosis        Bp is wnl    Fu in 6 month with Cmp, lipids, hep c         Subjective:       Patient ID: Kassandra Rodriguez is a 83 y.o. female.    Chief Complaint: No chief complaint on file.      Interim Hx  Last seen by me in 12/2023  Seen by uroogy    Concerns today  Didn't want to do the urology procedure  Tylenol larthits hlep          Chronic problems      Hypertension  This is a chronic problem. The current episode started more than 1 year ago. The problem has been waxing and waning since onset. Past treatments include calcium channel blockers. The current treatment provides significant improvement. There are no compliance problems.        Review of Systems          Health Maintenance Due   Topic Date Due    RSV Vaccine (Age 60+ and Pregnant patients) (1 - 1-dose 60+ series) Never done    COVID-19 Vaccine (7 - 2023-24 season) 01/28/2024         Objective:     LMP  (LMP Unknown)         3/21/2024     7:56 AM 2/2/2024     9:51 AM 12/18/2023     9:35 AM 11/1/2023     9:56 AM 10/30/2023     9:45 AM   Vitals   Height 5' 1" (1.549 m) 5' 1" (1.549 m) 5' 1" (1.549 m) 5' 1" (1.549 m) 5' 1" (1.549 m)   Weight (lbs) 150.13 152.12 149.25 148 150.24   BMI (kg/m2) 28.4 28.8 28.2 28 28.4                Physical Exam        Basic Labs    BMP  Lab Results   Component Value Date     12/15/2023    K 4.1 12/15/2023     12/15/2023    CO2 27 12/15/2023    BUN 11 12/15/2023    CREATININE 0.8 12/15/2023    CALCIUM 9.2 12/15/2023    ANIONGAP 6 (L) 12/15/2023    ESTGFRAFRICA >60.0 08/12/2021    EGFRNONAA >60.0 08/12/2021     Lab Results   Component Value Date    " "EGFRNORACEVR >60.0 12/15/2023       Lab Results   Component Value Date    ALT 14 10/30/2023    AST 19 10/30/2023    ALKPHOS 64 10/30/2023    BILITOT 0.3 10/30/2023         Lab Results   Component Value Date    TSH 0.749 10/30/2023     Lab Results   Component Value Date    WBC 6.69 10/30/2023    HGB 12.8 10/30/2023    HCT 39.5 10/30/2023     (H) 10/30/2023     10/30/2023           STD  No results found for: "HIV1X2", "GZP21MKXG"  Lab Results   Component Value Date    RPR Non-reactive 10/30/2023     No results found for: "HAV", "HEPAIGM", "HEPBIGM", "HEPBCAB", "HBEAG", "HEPCAB"  No results found for: "LABNGO", "LABCHLA"        Lipids  Lab Results   Component Value Date    CHOL 219 (H) 11/14/2022     Lab Results   Component Value Date    HDL 48 11/14/2022     Lab Results   Component Value Date    LDLCALC 155.2 11/14/2022     Lab Results   Component Value Date    TRIG 79 11/14/2022     Lab Results   Component Value Date    CHOLHDL 21.9 11/14/2022       DM  No results found for: "LABA1C", "HGBA1C"        Future Appointments   Date Time Provider Department Center   6/18/2024  9:00 AM Mesfin Perkins III, MD [facility]         Medication List with Changes/Refills   Current Medications    ACETAMINOPHEN (TYLENOL) 500 MG TABLET    Take 500 mg by mouth every 6 (six) hours as needed for Pain.    AMLODIPINE (NORVASC) 5 MG TABLET    Take 1 tablet by mouth once daily    ASPIRIN (ECOTRIN) 81 MG EC TABLET    Take 81 mg by mouth once daily.    CALCIUM-VITAMIN D 250-100 MG-UNIT PER TABLET    Take 1 tablet by mouth 2 (two) times daily.    CLOBETASOL 0.05% (TEMOVATE) 0.05 % OINT    Apply 1-2 times daily to top of vaginal lips/vulva area    ESTRADIOL (ESTRACE) 0.01 % (0.1 MG/GRAM) VAGINAL CREAM    Place 1 g vaginally once daily.    FLUTICASONE PROPIONATE (FLONASE) 50 MCG/ACTUATION NASAL SPRAY    1 spray (50 mcg total) by Each Nostril route once daily.    LEVOCETIRIZINE (XYZAL) 5 MG TABLET    Take 1 tablet (5 mg " total) by mouth every evening.    MEMANTINE (NAMENDA) 5 MG TAB    Take 1 tablet (5 mg total) by mouth 2 (two) times daily.    MIRABEGRON (MYRBETRIQ) 25 MG TB24 ER TABLET    Take 1 tablet (25 mg total) by mouth once daily.    MULTIVIT-MINS NO.63/IRON/FOLIC (M-VIT ORAL)    Take 1 tablet by mouth.    TURMERIC ROOT EXTRACT ORAL    Take by mouth.    VITAMIN D (VITAMIN D3) 1000 UNITS TAB    Take 1,000 Units by mouth once daily.         Disclaimer:  This note has been generated using voice-recognition software. There may be grammatical or spelling errors that have been missed during proof-reading Visit complexity inherent to evaluation and management associated with medical care services that serve as the continuing focal point for all needed health care services and/or with medical care services that are part of ongoing care related to a patient's single, serious condition or a complex condition

## 2024-06-18 NOTE — PROGRESS NOTES
Advance Care Planning     Date: 06/18/2024    She previosuly juancho paperwork , but cannot find in the system or in media

## 2024-07-22 ENCOUNTER — TELEPHONE (OUTPATIENT)
Dept: FAMILY MEDICINE | Facility: CLINIC | Age: 84
End: 2024-07-22
Payer: MEDICARE

## 2024-07-22 DIAGNOSIS — Z12.31 SCREENING MAMMOGRAM FOR BREAST CANCER: Primary | ICD-10-CM

## 2024-08-05 ENCOUNTER — HOSPITAL ENCOUNTER (OUTPATIENT)
Dept: RADIOLOGY | Facility: HOSPITAL | Age: 84
Discharge: HOME | End: 2024-08-05
Attending: INTERNAL MEDICINE
Payer: MEDICARE

## 2024-08-05 DIAGNOSIS — Z12.31 SCREENING MAMMOGRAM FOR BREAST CANCER: ICD-10-CM

## 2024-08-05 PROCEDURE — 77067 SCR MAMMO BI INCL CAD: CPT | Mod: TC

## 2024-08-05 PROCEDURE — 77063 BREAST TOMOSYNTHESIS BI: CPT | Mod: TC

## 2024-08-16 ENCOUNTER — TELEPHONE (OUTPATIENT)
Dept: INTERNAL MEDICINE | Facility: CLINIC | Age: 84
End: 2024-08-16
Payer: MEDICARE

## 2024-08-16 NOTE — TELEPHONE ENCOUNTER
Attempted to contact pt in regard to results Dr. Perkins reviewed. Left pt a detailed message and advised to call the office if needed.

## 2024-08-16 NOTE — TELEPHONE ENCOUNTER
----- Message from Mesfin Perkins III, MD sent at 8/16/2024  4:01 PM CDT -----  Greetings    I have reviewed the results of your mammogram and everything looks normal. Please don't hesitate to reach out to me if you have any questions regarding the test. We and repeat this in one year. Have a great day!

## 2024-08-29 DIAGNOSIS — J30.1 SEASONAL ALLERGIC RHINITIS DUE TO POLLEN: ICD-10-CM

## 2024-08-29 DIAGNOSIS — I10 ESSENTIAL HYPERTENSION: ICD-10-CM

## 2024-08-29 NOTE — TELEPHONE ENCOUNTER
No care due was identified.  [facility] Embedded Care Due Messages. Reference number: [id number].   8/29/2024 1:40:37 PM CDT

## 2024-08-30 RX ORDER — AMLODIPINE BESYLATE 5 MG/1
TABLET ORAL
Qty: 90 TABLET | Refills: 0 | Status: SHIPPED | OUTPATIENT
Start: 2024-08-30

## 2024-08-30 RX ORDER — MONTELUKAST SODIUM 10 MG/1
10 TABLET ORAL NIGHTLY
Qty: 30 TABLET | Refills: 0 | Status: SHIPPED | OUTPATIENT
Start: 2024-08-30

## 2024-08-30 NOTE — TELEPHONE ENCOUNTER
Refill Routing Note   Medication(s) are not appropriate for processing by [facility] for the following reason(s):        No active prescription written by provider  Due for refill >6 months ago    [facility] action(s):  Defer               Appointments  past 12m or future 3m with PCP    Date Provider   Last Visit   6/18/2024 Mesfin Perkins III, MD   Next Visit   1/21/2025 Mesfin Perkins III, MD   ED visits in past 90 days: 0        Note composed:3:42 AM 08/30/2024

## 2024-10-03 ENCOUNTER — OFFICE VISIT (OUTPATIENT)
Dept: FAMILY MEDICINE | Facility: CLINIC | Age: 84
End: 2024-10-03
Payer: MEDICARE

## 2024-10-03 VITALS
HEIGHT: 61 IN | WEIGHT: 146.19 LBS | SYSTOLIC BLOOD PRESSURE: 122 MMHG | HEART RATE: 104 BPM | BODY MASS INDEX: 27.6 KG/M2 | DIASTOLIC BLOOD PRESSURE: 60 MMHG | OXYGEN SATURATION: 98 %

## 2024-10-03 DIAGNOSIS — Z00.00 ENCOUNTER FOR PREVENTIVE HEALTH EXAMINATION: Primary | ICD-10-CM

## 2024-10-03 DIAGNOSIS — I70.0 AORTIC ATHEROSCLEROSIS: ICD-10-CM

## 2024-10-03 DIAGNOSIS — G31.84 MILD NEUROCOGNITIVE DISORDER: ICD-10-CM

## 2024-10-03 DIAGNOSIS — R41.3 MEMORY CHANGES: ICD-10-CM

## 2024-10-03 DIAGNOSIS — H90.A22 SENSORINEURAL HEARING LOSS (SNHL) OF LEFT EAR WITH RESTRICTED HEARING OF RIGHT EAR: ICD-10-CM

## 2024-10-03 PROCEDURE — 99999 PR PBB SHADOW E&M-EST. PATIENT-LVL V: CPT | Mod: PBBFAC,,,

## 2024-10-03 RX ORDER — MEMANTINE HYDROCHLORIDE 5 MG/1
5 TABLET ORAL 2 TIMES DAILY
Qty: 60 TABLET | Refills: 3 | Status: CANCELLED | OUTPATIENT
Start: 2024-10-03

## 2024-10-03 RX ORDER — MEMANTINE HYDROCHLORIDE 5 MG/1
5 TABLET ORAL 2 TIMES DAILY
Qty: 60 TABLET | Refills: 3 | Status: SHIPPED | OUTPATIENT
Start: 2024-10-03

## 2024-10-03 NOTE — PATIENT INSTRUCTIONS
[facility] [employer] DEPARTMENT  You should receive a gift card from [facility] for completing the medicare wellness visit. You can try calling # [phone] ([facility] [employer] department) to ask about your giftcard. Please wait 1 week to call to ensure that [facility] has received documentation for proof of medicare visit.     Counseling and Referral of Other Preventative  (Italic type indicates deductible and co-insurance are waived)    Patient Name: Kassandra Rodriguez  Today's Date: 10/3/2024    Health Maintenance       Date Due Completion Date    RSV Vaccine (Age 60+ and Pregnant patients) (1 - 1-dose 75+ series) Never done ---    DEXA Scan 01/08/2027 1/8/2024    TETANUS VACCINE 04/01/2027 4/1/2017    Lipid Panel 11/14/2027 11/14/2022        Orders Placed This Encounter   Procedures    Ambulatory referral/consult to Adult Neuropsychology    Ambulatory referral/consult to Outpatient Case Management     The following information is provided to all patients.  This information is to help you find resources for any of the problems found today that may be affecting your health:                  Living healthy guide: www.[facility].[place].[facility]      Understanding Diabetes: www.diabetes.org      Eating healthy: www.cdc.gov/healthyweight      CDC home safety checklist: www.cdc.gov/steadi/patient.html      Agency on Aging: www.goea.[place].gov      Alcoholics anonymous (AA): www.aa.org      Physical Activity: www.[place].nih.gov/so4kkab      Tobacco use: www.quitwithusla.org         Counseling and Referral of Other Preventative  (Italic type indicates deductible and co-insurance are waived)    Patient Name: Kassandra Rodriguez  Today's Date: 10/3/2024    Health Maintenance       Date Due Completion Date    RSV Vaccine (Age 60+ and Pregnant patients) (1 - 1-dose 75+ series) Never done ---    DEXA Scan 01/08/2027 1/8/2024    TETANUS VACCINE 04/01/2027 4/1/2017    Lipid Panel 11/14/2027 11/14/2022        Orders Placed This  Encounter   Procedures    Ambulatory referral/consult to Adult Neuropsychology    Ambulatory referral/consult to Outpatient Case Management     The following information is provided to all patients.  This information is to help you find resources for any of the problems found today that may be affecting your health:                  Living healthy guide: www.[facility].[place].[facility]      Understanding Diabetes: www.diabetes.org      Eating healthy: www.cdc.gov/healthyweight      CDC home safety checklist: www.cdc.gov/steadi/patient.html      Agency on Aging: www.goea.[place].[facility]      Alcoholics anonymous (AA): www.aa.org      Physical Activity: www.[place].nih.gov/ap2bgcc      Tobacco use: www.quitwithusla.org

## 2024-10-03 NOTE — PROGRESS NOTES
Kassandra Rodriguez presented for a  Medicare AWV and comprehensive Health Risk Assessment today. The following components were reviewed and updated:    Medical history  Family History  Social history  Allergies and Current Medications  Health Risk Assessment  Health Maintenance  Care Team         ** See Completed Assessments for Annual Wellness Visit within the encounter summary.**         The following assessments were completed:  Living Situation  CAGE  Depression Screening  Timed Get Up and Go  Whisper Test  Cognitive Function Screening    Nutrition Screening  ADL Screening  PAQ Screening      Opioid documentation for eAWV      Patient does not have a current opioid prescription.        Review for Substance Use Disorders: Patient does not use substance        Current Outpatient Medications:     acetaminophen (TYLENOL) 500 MG tablet, Take 500 mg by mouth every 6 (six) hours as needed for Pain., Disp: , Rfl:     amLODIPine (NORVASC) 5 MG tablet, Take 1 tablet by mouth once daily, Disp: 90 tablet, Rfl: 0    aspirin (ECOTRIN) 81 MG EC tablet, Take 81 mg by mouth once daily., Disp: , Rfl:     calcium-vitamin D 250-100 mg-unit per tablet, Take 1 tablet by mouth 2 (two) times daily., Disp: , Rfl:     multivit-mins no.63/iron/folic (M-VIT ORAL), Take 1 tablet by mouth., Disp: , Rfl:     vitamin D (VITAMIN D3) 1000 units Tab, Take 1,000 Units by mouth once daily., Disp: , Rfl:     clobetasol 0.05% (TEMOVATE) 0.05 % Oint, Apply 1-2 times daily to top of vaginal lips/vulva area (Patient not taking: Reported on 10/3/2024), Disp: 30 g, Rfl: 0    estradioL (ESTRACE) 0.01 % (0.1 mg/gram) vaginal cream, Place 1 g vaginally once daily. (Patient not taking: Reported on 10/3/2024), Disp: 42.5 g, Rfl: 3    fluticasone propionate (FLONASE) 50 mcg/actuation nasal spray, 1 spray (50 mcg total) by Each Nostril route once daily. (Patient not taking: Reported on 10/3/2024), Disp: 15.8 mL, Rfl: 0    levocetirizine (XYZAL) 5 MG tablet, Take 1  "tablet (5 mg total) by mouth every evening., Disp: 30 tablet, Rfl: 11    memantine (NAMENDA) 5 MG Tab, Take 1 tablet (5 mg total) by mouth 2 (two) times daily., Disp: 60 tablet, Rfl: 3    mirabegron (MYRBETRIQ) 25 mg Tb24 ER tablet, Take 1 tablet (25 mg total) by mouth once daily. (Patient not taking: Reported on 10/3/2024), Disp: 30 tablet, Rfl: 11    montelukast (SINGULAIR) 10 mg tablet, TAKE 1 TABLET BY MOUTH ONCE DAILY IN THE EVENING (Patient not taking: Reported on 10/3/2024), Disp: 30 tablet, Rfl: 0    TURMERIC ROOT EXTRACT ORAL, Take by mouth. (Patient not taking: Reported on 10/3/2024), Disp: , Rfl:        Vitals:    10/03/24 0956   BP: 122/60   Pulse: 104   SpO2: 98%   Weight: 66.3 kg (146 lb 2.6 oz)   Height: 5' 1" (1.549 m)   PainSc: 0-No pain      Physical Exam  Vitals reviewed.   Constitutional:       General: She is not in acute distress.     Appearance: Normal appearance. She is not ill-appearing.   HENT:      Head: Normocephalic and atraumatic.      Nose: Nose normal.      Mouth/Throat:      Mouth: Mucous membranes are moist.   Eyes:      General: No scleral icterus.        Right eye: No discharge.         Left eye: No discharge.      Extraocular Movements: Extraocular movements intact.      Conjunctiva/sclera: Conjunctivae normal.   Cardiovascular:      Rate and Rhythm: Normal rate.   Pulmonary:      Effort: Pulmonary effort is normal. No respiratory distress.   Musculoskeletal:      Cervical back: Normal range of motion.   Skin:     General: Skin is warm and dry.   Neurological:      Mental Status: She is alert and oriented to person, place, and time.   Psychiatric:         Mood and Affect: Mood normal.         Behavior: Behavior normal. Behavior is cooperative.         Cognition and Memory: Cognition and memory normal.               Diagnoses and health risks identified today and associated recommendations/orders:    1. Encounter for preventive health examination  - Chart reviewed. Problem list " updated. Discussed current medical diagnosis, current medications, medical/surgical/family/social history; updated provider list; documented vital signs; identified any cognitive impairment; and updated risk factor list. Addressed any outstanding health maintenance. Provided patient with personalized health advice. Continue to follow up with PCP and any specialists.   -Patient stated that she has trouble paying for medication  - Ambulatory referral/consult to Outpatient Case Management    2. Aortic atherosclerosis  Chronic; follow up with PCP  -Not taking statin     3. Mild neurocognitive disorder  Chronic; stable on current treatment plan; follow up with PCP  -previously seen by neuropsych; does not have a follow up appointment.   -Referral for neuropsych  - memantine (NAMENDA) 5 MG Tab; Take 1 tablet (5 mg total) by mouth 2 (two) times daily.  Dispense: 60 tablet; Refill: 3    4. Sensorineural hearing loss (SNHL) of left ear with restricted hearing of right ear  Chronic; follow up with PCP  -wears hearing aids      5. Memory changes  Chronic; stable on current treatment plan; follow up with PCP  -previously seen by neuropsych; does not have a follow up appointment.   -Referral for neuropsych  - memantine (NAMENDA) 5 MG Tab; Take 1 tablet (5 mg total) by mouth 2 (two) times daily.  Dispense: 60 tablet; Refill: 3    6. BMI 27.0-27.9,adult  - Recommendation for healthy diet and increasing exercise as tolerated with goal of 150min/week .           Provided Kassandra BOBO with a 5-10 year written screening schedule and personal prevention plan. Recommendations were developed using the USPSTF age appropriate recommendations. Education, counseling, and referrals were provided as needed. After Visit Summary printed and given to patient which includes a list of additional screenings\tests needed.    Follow up in about 1 year (around 10/3/2025) for your next medicare wellness visit.    Jess Aggarwal PA-C    Advance Care Planning      I offered to discuss advanced care planning, including how to pick a person who would make decisions for you if you were unable to make them for yourself, called a health care power of , and what kind of decisions you might make such as use of life sustaining treatments such as ventilators and tube feeding when faced with a life limiting illness recorded on a living will that they will need to know. (How you want to be cared for as you near the end of your natural life)     X  Patient has advanced directives on file, which we reviewed, and they do not wish to make changes.

## 2024-10-08 ENCOUNTER — TELEPHONE (OUTPATIENT)
Dept: PHARMACY | Facility: CLINIC | Age: 84
End: 2024-10-08
Payer: MEDICARE

## 2024-10-08 ENCOUNTER — PATIENT OUTREACH (OUTPATIENT)
Dept: ADMINISTRATIVE | Facility: OTHER | Age: 84
End: 2024-10-08
Payer: MEDICARE

## 2024-10-08 NOTE — TELEPHONE ENCOUNTER
We have reviewed Ms. Rodriguez current medication list and/or insurance status. Unfortunately, The Pharmacy Patient Assistance Team is unable to assist at this time due to the following reasons      [facility]/ [facility] is not currently accepting applications for new or renewal patients. Patient has been put on [facility]'s waiting list. [facility] will send the patient and myself and email once the daina begins accepting applications ([place])             John Flores   Pharmacy Patient Assistance Team

## 2024-10-08 NOTE — PROGRESS NOTES
CHW - Initial Contact    This Community Health Worker verified only  the Social Determinant of Health questionnaire with patient via telephone today.      Pt identified barriers of most importance are: referral states Prescription Assistance/Medication     Patient confirmed her co pay for Rx can cost $50-$100 and with pt's fixed income any additional expenses can be stressful financially    Support and Services: CHW sent a referral for Memantine 5 mg to [facility]'s PAP team via in basket    Other information discussed the patient needs / wants help with: establishing care with Neurology  Aurea completed an order with Adult Neurology on 10/3/24 CHW will call pt back later this week if the neurology department hasn't contacted pt to schedule     Neuorology [phone]    Follow up required: yes    Follow-up Outreach - Due: 10/10/2024

## 2024-10-11 ENCOUNTER — PATIENT OUTREACH (OUTPATIENT)
Dept: ADMINISTRATIVE | Facility: OTHER | Age: 84
End: 2024-10-11
Payer: MEDICARE

## 2024-10-11 NOTE — PROGRESS NOTES
CHW - Outreach Attempt    Community Health Worker left a voicemail message for 1st attempt to contact patient regarding: Patient has been put on [facility]'s waiting list.     Community Health Worker to attempt to contact patient on: 10/11/24

## 2024-10-15 ENCOUNTER — PATIENT OUTREACH (OUTPATIENT)
Dept: ADMINISTRATIVE | Facility: OTHER | Age: 84
End: 2024-10-15
Payer: MEDICARE

## 2024-10-15 NOTE — PROGRESS NOTES
CHW - Case Closure    This Community Health Worker spoke to patient via telephone today.     Pt/Caregiver reported: pt verbalized understanding about being on the [facility] wait list    Pt is inquiring about $50 gift card from [facility] CHW will contact [facility] VIA chat to verify this rep stated pt's plan doesn't have any benefit for this         Pt/Caregiver denied any additional needs at this time and agrees with episode closure at this time.  Provided patient with Community Health Worker's contact information and encouraged him/her to contact this Community Health Worker if additional needs arise.

## 2024-10-28 ENCOUNTER — PATIENT OUTREACH (OUTPATIENT)
Dept: ADMINISTRATIVE | Facility: OTHER | Age: 84
End: 2024-10-28
Payer: MEDICARE

## 2025-01-13 ENCOUNTER — LAB VISIT (OUTPATIENT)
Dept: LAB | Facility: HOSPITAL | Age: 85
End: 2025-01-13
Attending: INTERNAL MEDICINE
Payer: MEDICARE

## 2025-01-13 DIAGNOSIS — Z11.59 ENCOUNTER FOR HEPATITIS C SCREENING TEST FOR LOW RISK PATIENT: ICD-10-CM

## 2025-01-13 DIAGNOSIS — I10 ESSENTIAL HYPERTENSION: ICD-10-CM

## 2025-01-13 DIAGNOSIS — I70.0 AORTIC ATHEROSCLEROSIS: ICD-10-CM

## 2025-01-13 LAB
ALBUMIN SERPL BCP-MCNC: 3.6 G/DL (ref 3.5–5.2)
ALP SERPL-CCNC: 61 U/L (ref 40–150)
ALT SERPL W/O P-5'-P-CCNC: 15 U/L (ref 10–44)
ANION GAP SERPL CALC-SCNC: 7 MMOL/L (ref 8–16)
AST SERPL-CCNC: 22 U/L (ref 10–40)
BILIRUB SERPL-MCNC: 0.4 MG/DL (ref 0.1–1)
BUN SERPL-MCNC: 11 MG/DL (ref 8–23)
CALCIUM SERPL-MCNC: 9.5 MG/DL (ref 8.7–10.5)
CHLORIDE SERPL-SCNC: 106 MMOL/L (ref 95–110)
CHOLEST SERPL-MCNC: 213 MG/DL (ref 120–199)
CHOLEST/HDLC SERPL: 4.3 {RATIO} (ref 2–5)
CO2 SERPL-SCNC: 28 MMOL/L (ref 23–29)
CREAT SERPL-MCNC: 0.7 MG/DL (ref 0.5–1.4)
EST. GFR  (NO RACE VARIABLE): >60 ML/MIN/1.73 M^2
GLUCOSE SERPL-MCNC: 84 MG/DL (ref 70–110)
HCV AB SERPL QL IA: NORMAL
HDLC SERPL-MCNC: 50 MG/DL (ref 40–75)
HDLC SERPL: 23.5 % (ref 20–50)
LDLC SERPL CALC-MCNC: 147 MG/DL (ref 63–159)
NONHDLC SERPL-MCNC: 163 MG/DL
POTASSIUM SERPL-SCNC: 4 MMOL/L (ref 3.5–5.1)
PROT SERPL-MCNC: 8.5 G/DL (ref 6–8.4)
SODIUM SERPL-SCNC: 141 MMOL/L (ref 136–145)
TRIGL SERPL-MCNC: 80 MG/DL (ref 30–150)

## 2025-01-13 PROCEDURE — 80053 COMPREHEN METABOLIC PANEL: CPT | Performed by: INTERNAL MEDICINE

## 2025-01-13 PROCEDURE — 36415 COLL VENOUS BLD VENIPUNCTURE: CPT | Mod: PO | Performed by: INTERNAL MEDICINE

## 2025-01-13 PROCEDURE — 80061 LIPID PANEL: CPT | Performed by: INTERNAL MEDICINE

## 2025-01-13 PROCEDURE — 86803 HEPATITIS C AB TEST: CPT | Performed by: INTERNAL MEDICINE

## 2025-01-20 ENCOUNTER — TELEPHONE (OUTPATIENT)
Dept: FAMILY MEDICINE | Facility: CLINIC | Age: 85
End: 2025-01-20
Payer: MEDICARE

## 2025-01-20 NOTE — TELEPHONE ENCOUNTER
Spoke with Pt. Pt does not have my chart acces. Pt would like to reschedule. Rescheduled appt per pt request.

## 2025-01-24 ENCOUNTER — OFFICE VISIT (OUTPATIENT)
Dept: INTERNAL MEDICINE | Facility: CLINIC | Age: 85
End: 2025-01-24
Payer: MEDICARE

## 2025-01-24 VITALS
SYSTOLIC BLOOD PRESSURE: 118 MMHG | DIASTOLIC BLOOD PRESSURE: 72 MMHG | WEIGHT: 147.06 LBS | HEIGHT: 61 IN | HEART RATE: 99 BPM | BODY MASS INDEX: 27.77 KG/M2 | OXYGEN SATURATION: 98 %

## 2025-01-24 DIAGNOSIS — I70.0 AORTIC ATHEROSCLEROSIS: ICD-10-CM

## 2025-01-24 DIAGNOSIS — I10 ESSENTIAL HYPERTENSION: ICD-10-CM

## 2025-01-24 DIAGNOSIS — R77.9 ELEVATED BLOOD PROTEIN: Primary | ICD-10-CM

## 2025-01-24 PROCEDURE — 1126F AMNT PAIN NOTED NONE PRSNT: CPT | Mod: CPTII,S$GLB,, | Performed by: INTERNAL MEDICINE

## 2025-01-24 PROCEDURE — 1160F RVW MEDS BY RX/DR IN RCRD: CPT | Mod: CPTII,S$GLB,, | Performed by: INTERNAL MEDICINE

## 2025-01-24 PROCEDURE — 1157F ADVNC CARE PLAN IN RCRD: CPT | Mod: CPTII,S$GLB,, | Performed by: INTERNAL MEDICINE

## 2025-01-24 PROCEDURE — 1159F MED LIST DOCD IN RCRD: CPT | Mod: CPTII,S$GLB,, | Performed by: INTERNAL MEDICINE

## 2025-01-24 PROCEDURE — 3078F DIAST BP <80 MM HG: CPT | Mod: CPTII,S$GLB,, | Performed by: INTERNAL MEDICINE

## 2025-01-24 PROCEDURE — 99999 PR PBB SHADOW E&M-EST. PATIENT-LVL III: CPT | Mod: PBBFAC,,, | Performed by: INTERNAL MEDICINE

## 2025-01-24 PROCEDURE — 99214 OFFICE O/P EST MOD 30 MIN: CPT | Mod: S$GLB,,, | Performed by: INTERNAL MEDICINE

## 2025-01-24 PROCEDURE — G2211 COMPLEX E/M VISIT ADD ON: HCPCS | Mod: S$GLB,,, | Performed by: INTERNAL MEDICINE

## 2025-01-24 PROCEDURE — 3288F FALL RISK ASSESSMENT DOCD: CPT | Mod: CPTII,S$GLB,, | Performed by: INTERNAL MEDICINE

## 2025-01-24 PROCEDURE — 1101F PT FALLS ASSESS-DOCD LE1/YR: CPT | Mod: CPTII,S$GLB,, | Performed by: INTERNAL MEDICINE

## 2025-01-24 PROCEDURE — 3074F SYST BP LT 130 MM HG: CPT | Mod: CPTII,S$GLB,, | Performed by: INTERNAL MEDICINE

## 2025-01-24 NOTE — PROGRESS NOTES
"  Assessment:         1. Elevated blood protein    2. Aortic atherosclerosis    3. Essential hypertension          Plan:           Kassandra Morris" was seen today for follow-up.    Diagnoses and all orders for this visit:    Elevated blood protein  -     Protein Electrophoresis, Serum; Standing  -     Protein electrophoresis, timed urine; Standing  -     Immunofixation, Urine  Random; Standing  -     Immunofixation Electrophoresis; Standing  -     HIV 1/2 Ag/Ab (4th Gen); Standing    Aortic atherosclerosis  -     Lipid Panel; Standing  -     Basic Metabolic Panel; Standing  -     Hepatic Function Panel; Standing    Essential hypertension  -     Basic Metabolic Panel; Standing  -     Hepatic Function Panel; Standing        Assessment & Plan    IMPRESSION:  Reviewed patient's recent labs, noting elevated cholesterol (total 213, )  Considered discontinuing amlodipine 5mg to reassess blood pressure control off medication  Noted elevated protein levels in blood test, considering further investigation    PATIENT EDUCATION:  - Discussed importance of reducing sugar intake, noting that sugar-free alternatives may taste sweeter  - Explained that elevated protein levels in labs are not typically related to dietary protein intake    ACTION ITEMS/LIFESTYLE:  - Patient to continue exercising 3 times per week  - Patient to reduce sugar intake, consider switching to sugar-free alternatives    MEDICATIONS:  - Discontinued amlodipine 5mg  - Continued calcium supplement  - Continued baby aspirin  - Continued memantine 5mg  - Continued multivitamin  - Continued vitamin D    ORDERS:  - Blood pressure recheck ordered in 4 weeks  - Repeat labs ordered in 4 weeks, including protein levels    FOLLOW UP:  - Follow up in 4 weeks for blood pressure check and repeat labs  - If blood pressure and lab results are normal, follow up in 6 months for routine visit             Subjective:       Patient ID: Kassandra Rdoriguez is a 84 y.o. " "female.    Chief Complaint: Follow-up      Interim Hx  Concerns today  Chronic problems        History of Present Illness    CHIEF COMPLAINT:  Patient presents today for follow-up.    HYPERTENSION:  She continues amlodipine 5 mg for blood pressure management and denies experiencing dizziness or lightheadedness.    MEMORY:  She reports memory concerns and continues Memantine 5 mg prescribed by her previous physician who has since retired.    HYPERLIPIDEMIA:  Total cholesterol was 213 and LDL was 174. Electrolytes, kidney function, and liver function tests were normal. Protein levels were elevated.    DIET AND EXERCISE:  Her diet primarily consists of chicken and turkey, with limited consumption of red meat. She exercises 3 times per week at [employer].    MEDICATIONS:  She takes seven pills daily including calcium and baby aspirin. She takes Tylenol as needed for arthritis pain in her knuckles.    MUSCULOSKELETAL:  She experiences arthritis pain in her knuckles managed with Tylenol.      ROS:  Musculoskeletal: +joint pain  Neurological: -dizziness, -lightheadedness  Psychiatric: +memory problems           Review of Systems   All other systems reviewed and are negative.          Health Maintenance Due   Topic Date Due    RSV Vaccine (Age 60+ and Pregnant patients) (1 - 1-dose 75+ series) Never done         Objective:     /72 (BP Location: Left arm, Patient Position: Sitting)   Pulse 99   Ht 5' 1" (1.549 m)   Wt 66.7 kg (147 lb 0.8 oz)   LMP  (LMP Unknown)   SpO2 98%   BMI 27.78 kg/m²   .Physical Exam                    1/24/2025     2:23 PM 10/3/2024     9:56 AM 6/18/2024     8:56 AM 3/21/2024     7:56 AM 2/2/2024     9:51 AM   Vitals   Height 5' 1" (1.549 m) 5' 1" (1.549 m) 5' 1" (1.549 m) 5' 1" (1.549 m) 5' 1" (1.549 m)   Weight (lbs) 147.05 146.17 149.69 150.13 152.12   BMI (kg/m2) 27.8 27.6 28.3 28.4 28.8          Physical Exam  Vitals and nursing note reviewed.   Constitutional:       General: She " is not in acute distress.     Appearance: She is well-developed. She is not diaphoretic.   HENT:      Head: Normocephalic.      Nose: Nose normal.   Eyes:      General:         Right eye: No discharge.         Left eye: No discharge.      Conjunctiva/sclera: Conjunctivae normal.      Pupils: Pupils are equal, round, and reactive to light.   Cardiovascular:      Rate and Rhythm: Normal rate and regular rhythm.      Heart sounds: Normal heart sounds. No murmur heard.     No friction rub. No gallop.   Pulmonary:      Effort: Pulmonary effort is normal. No respiratory distress.   Abdominal:      General: Bowel sounds are normal. There is no distension.      Palpations: Abdomen is soft.   Musculoskeletal:         General: No deformity. Normal range of motion.      Cervical back: Normal range of motion.   Skin:     General: Skin is warm.   Neurological:      Mental Status: She is alert and oriented to person, place, and time.      Cranial Nerves: No cranial nerve deficit.           Recent Results (from the past 2 weeks)   Comprehensive Metabolic Panel    Collection Time: 01/13/25  7:57 AM   Result Value Ref Range    Sodium 141 136 - 145 mmol/L    Potassium 4.0 3.5 - 5.1 mmol/L    Chloride 106 95 - 110 mmol/L    CO2 28 23 - 29 mmol/L    Glucose 84 70 - 110 mg/dL    BUN 11 8 - 23 mg/dL    Creatinine 0.7 0.5 - 1.4 mg/dL    Calcium 9.5 8.7 - 10.5 mg/dL    Total Protein 8.5 (H) 6.0 - 8.4 g/dL    Albumin 3.6 3.5 - 5.2 g/dL    Total Bilirubin 0.4 0.1 - 1.0 mg/dL    Alkaline Phosphatase 61 40 - 150 U/L    AST 22 10 - 40 U/L    ALT 15 10 - 44 U/L    eGFR >60.0 >60 mL/min/1.73 m^2    Anion Gap 7 (L) 8 - 16 mmol/L   Lipid Panel    Collection Time: 01/13/25  7:57 AM   Result Value Ref Range    Cholesterol 213 (H) 120 - 199 mg/dL    Triglycerides 80 30 - 150 mg/dL    HDL 50 40 - 75 mg/dL    LDL Cholesterol 147.0 63.0 - 159.0 mg/dL    HDL/Cholesterol Ratio 23.5 20.0 - 50.0 %    Total Cholesterol/HDL Ratio 4.3 2.0 - 5.0    Non-HDL  Cholesterol 163 mg/dL   Hepatitis C Antibody    Collection Time: 01/13/25  7:57 AM   Result Value Ref Range    Hepatitis C Ab Non-reactive Non-reactive           Future Appointments   Date Time Provider Department Center   2/20/2025  9[facility]   2/20/2025  9:45 AM LAB, AM[facility]   2/20/2025 10:00 AM [facility] [place]   7/24[facility]2025  [facility]   7/25/2025  1:40 PM Mesfin Perkins III, MD [facility]   10/3/2025  2:00 PM Jess Aggarwal PA-C [facility]         Medication List with Changes/Refills   Current Medications    ACETAMINOPHEN (TYLENOL) 500 MG TABLET    Take 500 mg by mouth every 6 (six) hours as needed for Pain.    AMLODIPINE (NORVASC) 5 MG TABLET    Take 1 tablet by mouth once daily    ASPIRIN (ECOTRIN) 81 MG EC TABLET    Take 81 mg by mouth once daily.    CALCIUM-VITAMIN D 250-100 MG-UNIT PER TABLET    Take 1 tablet by mouth 2 (two) times daily.    FLUTICASONE PROPIONATE (FLONASE) 50 MCG/ACTUATION NASAL SPRAY    1 spray (50 mcg total) by Each Nostril route once daily.    LEVOCETIRIZINE (XYZAL) 5 MG TABLET    Take 1 tablet (5 mg total) by mouth every evening.    MEMANTINE (NAMENDA) 5 MG TAB    Take 1 tablet (5 mg total) by mouth 2 (two) times daily.    MULTIVIT-MINS NO.63/IRON/FOLIC (M-VIT ORAL)    Take 1 tablet by mouth.    MULTIVITAMIN-MINERALS-LUTEIN TAB    Take 1 tablet by mouth once daily.    VITAMIN D (VITAMIN D3) 1000 UNITS TAB    Take 1,000 Units by mouth once daily.   Discontinued Medications    CLOBETASOL 0.05% (TEMOVATE) 0.05 % OINT    Apply 1-2 times daily to top of vaginal lips/vulva area    ESTRADIOL (ESTRACE) 0.01 % (0.1 MG/GRAM) VAGINAL CREAM    Place 1 g vaginally once daily.    MIRABEGRON (MYRBETRIQ) 25 MG TB24 ER TABLET    Take 1 tablet (25 mg total) by mouth once daily.    MONTELUKAST (SINGULAIR) 10 MG TABLET    TAKE 1 TABLET BY MOUTH ONCE DAILY IN THE EVENING     TURMERIC ROOT EXTRACT ORAL    Take by mouth.         Disclaimer:  This note has been generated using voice-recognition software. There may be grammatical or spelling errors that have been missed during proof-reading   This note was generated with the assistance of ambient listening technology. Verbal consent was obtained by the patient and accompanying visitor(s) for the recording of patient appointment to facilitate this note. I attest to having reviewed and edited the generated note for accuracy, though some syntax or spelling errors may persist. Please contact the author of this note for any clarification.

## 2025-02-24 ENCOUNTER — LAB VISIT (OUTPATIENT)
Dept: LAB | Facility: HOSPITAL | Age: 85
End: 2025-02-24
Attending: INTERNAL MEDICINE
Payer: MEDICARE

## 2025-02-24 ENCOUNTER — OFFICE VISIT (OUTPATIENT)
Dept: FAMILY MEDICINE | Facility: CLINIC | Age: 85
End: 2025-02-24
Payer: MEDICARE

## 2025-02-24 ENCOUNTER — CLINICAL SUPPORT (OUTPATIENT)
Dept: FAMILY MEDICINE | Facility: CLINIC | Age: 85
End: 2025-02-24
Payer: MEDICARE

## 2025-02-24 VITALS — WEIGHT: 149.06 LBS | BODY MASS INDEX: 28.16 KG/M2 | HEART RATE: 86 BPM | OXYGEN SATURATION: 97 %

## 2025-02-24 DIAGNOSIS — R21 RASH: ICD-10-CM

## 2025-02-24 DIAGNOSIS — Z01.30 BP CHECK: Primary | ICD-10-CM

## 2025-02-24 DIAGNOSIS — L29.9 GENERALIZED PRURITUS: Primary | ICD-10-CM

## 2025-02-24 DIAGNOSIS — R77.9 ELEVATED BLOOD PROTEIN: ICD-10-CM

## 2025-02-24 LAB — HIV 1+2 AB+HIV1 P24 AG SERPL QL IA: NORMAL

## 2025-02-24 PROCEDURE — 99214 OFFICE O/P EST MOD 30 MIN: CPT | Mod: S$GLB,,,

## 2025-02-24 PROCEDURE — 86334 IMMUNOFIX E-PHORESIS SERUM: CPT | Performed by: INTERNAL MEDICINE

## 2025-02-24 PROCEDURE — 87389 HIV-1 AG W/HIV-1&-2 AB AG IA: CPT | Performed by: INTERNAL MEDICINE

## 2025-02-24 PROCEDURE — 1159F MED LIST DOCD IN RCRD: CPT | Mod: CPTII,S$GLB,,

## 2025-02-24 PROCEDURE — 1101F PT FALLS ASSESS-DOCD LE1/YR: CPT | Mod: CPTII,S$GLB,,

## 2025-02-24 PROCEDURE — 1157F ADVNC CARE PLAN IN RCRD: CPT | Mod: CPTII,S$GLB,,

## 2025-02-24 PROCEDURE — 84165 PROTEIN E-PHORESIS SERUM: CPT | Mod: 26,,, | Performed by: PATHOLOGY

## 2025-02-24 PROCEDURE — 3288F FALL RISK ASSESSMENT DOCD: CPT | Mod: CPTII,S$GLB,,

## 2025-02-24 PROCEDURE — 1126F AMNT PAIN NOTED NONE PRSNT: CPT | Mod: CPTII,S$GLB,,

## 2025-02-24 PROCEDURE — 84165 PROTEIN E-PHORESIS SERUM: CPT | Performed by: INTERNAL MEDICINE

## 2025-02-24 PROCEDURE — 36415 COLL VENOUS BLD VENIPUNCTURE: CPT | Mod: PO | Performed by: INTERNAL MEDICINE

## 2025-02-24 PROCEDURE — 1160F RVW MEDS BY RX/DR IN RCRD: CPT | Mod: CPTII,S$GLB,,

## 2025-02-24 PROCEDURE — 99999 PR PBB SHADOW E&M-EST. PATIENT-LVL IV: CPT | Mod: PBBFAC,,,

## 2025-02-24 PROCEDURE — 86334 IMMUNOFIX E-PHORESIS SERUM: CPT | Mod: 26,,, | Performed by: PATHOLOGY

## 2025-02-24 NOTE — PROGRESS NOTES
"  [facility]    2/24/2025      Subjective:       Patient ID:  Kassandra is a 84 y.o. female .  has a past medical history of Hearing loss and Hypertension.    History of Present Illness    CHIEF COMPLAINT:  Kassandra presents today for follow up of rash     DERMATOLOGIC:  She reports a 2-week history of generalized pruritus from head to feet, describing it as an allergic-like reaction with intense itching. Initial self-treatment with washing and applying grease was ineffective. She subsequently used benadryl and topical hydrocortisone with symptom improvement. She suspects the symptoms may be related to recently starting Елена sensitive bubble bath, which coincided with symptom onset.        ROS:  Constitutional: -chills, -fever  Respiratory: -cough, -shortness of breath  Cardiovascular: -chest pain  Gastrointestinal: -abdominal pain, -constipation, -diarrhea, -nausea, -vomiting  Neurological: -dizziness, -lightheadedness, -headaches  Skin: +itching           Problem List[1]      Last HgbA1C:    No results found for: "HGBA1C"      Last Lipid Panel:    Lab Results   Component Value Date    HDL 50 01/13/2025    HDL 48 11/14/2022    HDL 48 02/11/2021       Lab Results   Component Value Date    LDLCALC 147.0 01/13/2025    LDLCALC 155.2 11/14/2022    LDLCALC 153.0 02/11/2021       Lab Results   Component Value Date    TRIG 80 01/13/2025    TRIG 79 11/14/2022    TRIG 70 02/11/2021       Lab Results   Component Value Date    CHOLHDL 23.5 01/13/2025    CHOLHDL 21.9 11/14/2022    CHOLHDL 22.3 02/11/2021         Review of patient's allergies indicates:   Allergen Reactions    Penicillins Other (See Comments)        Medication List with Changes/Refills   Current Medications    ACETAMINOPHEN (TYLENOL) 500 MG TABLET    Take 500 mg by mouth every 6 (six) hours as needed for Pain.    AMLODIPINE (NORVASC) 5 MG TABLET    Take 1 tablet by mouth once daily    ASPIRIN (ECOTRIN) 81 MG EC TABLET    Take 81 mg by " "mouth once daily.    CALCIUM-VITAMIN D 250-100 MG-UNIT PER TABLET    Take 1 tablet by mouth 2 (two) times daily.    FLUTICASONE PROPIONATE (FLONASE) 50 MCG/ACTUATION NASAL SPRAY    1 spray (50 mcg total) by Each Nostril route once daily.    LEVOCETIRIZINE (XYZAL) 5 MG TABLET    Take 1 tablet (5 mg total) by mouth every evening.    MEMANTINE (NAMENDA) 5 MG TAB    Take 1 tablet (5 mg total) by mouth 2 (two) times daily.    MULTIVIT-MINS NO.63/IRON/FOLIC (M-VIT ORAL)    Take 1 tablet by mouth.    MULTIVITAMIN-MINERALS-LUTEIN TAB    Take 1 tablet by mouth once daily.    VITAMIN D (VITAMIN D3) 1000 UNITS TAB    Take 1,000 Units by mouth once daily.               Objective:      BP (P) 112/62 (BP Location: Right arm, Patient Position: Sitting)   Pulse 86   Wt 67.6 kg (149 lb 0.5 oz)   LMP  (LMP Unknown)   SpO2 97%   BMI 28.16 kg/m²   Estimated body mass index is 28.16 kg/m² as calculated from the following:    Height as of 1/24/25: 5' 1" (1.549 m).    Weight as of this encounter: 67.6 kg (149 lb 0.5 oz).    Physical Exam  Vitals reviewed.   Constitutional:       General: She is not in acute distress.     Appearance: Normal appearance.   HENT:      Head: Normocephalic and atraumatic.      Mouth/Throat:      Mouth: Mucous membranes are moist.   Eyes:      Conjunctiva/sclera: Conjunctivae normal.   Cardiovascular:      Rate and Rhythm: Normal rate.   Pulmonary:      Effort: Pulmonary effort is normal. No respiratory distress.   Musculoskeletal:         General: Normal range of motion.      Cervical back: Normal range of motion.   Skin:     General: Skin is warm.      Findings: No lesion or rash.   Neurological:      Mental Status: She is alert and oriented to person, place, and time.   Psychiatric:         Mood and Affect: Mood normal.         Behavior: Behavior normal.             Assessment and Plan:   1. Generalized pruritus    2. Rash     Assessment & Plan    IMPRESSION:  - Reviewed patient's complaint of recent " itching episode; likely due to allergic reaction from new bath product    ALLERGIC REACTION:  - Evaluated the patient, who reports severe itching from head to feet for 2 weeks, with no visible signs on exam.  - Assessed the condition as a potential allergic reaction to new soap.  - Educated the patient about potential sensitivity to scented bath products and the importance of using gentle, fragrance-free options for sensitive skin.  - Recommend discontinuing the use of [place] product.  - Advised trying Cetaphil or other sensitive skin body wash for bathing.      FOLLOW UP:  - Follow up as scheduled         The patient was informed of the following statements     Emergency Care:Seek immediate medical attention in the emergency room if you experience any new or worsening symptoms, or if your current condition significantly changes or becomes more severe.  Patient Acknowledgment: Patient verbalizes understanding of the plan and agrees to proceed with the recommended care.    Follow Up:  FU as scheduled with labs prior                    Other Orders Placed This Visit:  No orders of the defined types were placed in this encounter.        This note was generated with the assistance of ambient listening technology. Verbal consent was obtained by the patient and accompanying visitor(s) for the recording of patient appointment to facilitate this note. I attest to having reviewed and edited the generated note for accuracy, though some syntax or spelling errors may persist. Please contact the author of this note for any clarification.        Jess Aggarwal PA-C        I spent a total of 30 minutes on the day of the visit.This includes face to face time and non-face to face time preparing to see the patient (eg, review of tests), obtaining and/or reviewing separately obtained history, documenting clinical information in the electronic or other health record, independently interpreting results and communicating results to the  patient/family/caregiver, or care coordinator.          [1]   Patient Active Problem List  Diagnosis    Essential hypertension    History of Guillain-Bono syndrome    Atrophic vaginitis    Sensorineural hearing loss (SNHL) of left ear with restricted hearing of right ear    Aortic atherosclerosis    Osteopenia

## 2025-02-25 LAB
ALBUMIN SERPL ELPH-MCNC: 3.54 G/DL (ref 3.35–5.55)
ALPHA1 GLOB SERPL ELPH-MCNC: 0.3 G/DL (ref 0.17–0.41)
ALPHA2 GLOB SERPL ELPH-MCNC: 0.95 G/DL (ref 0.43–0.99)
B-GLOBULIN SERPL ELPH-MCNC: 1.07 G/DL (ref 0.5–1.1)
GAMMA GLOB SERPL ELPH-MCNC: 1.55 G/DL (ref 0.67–1.58)
INTERPRETATION SERPL IFE-IMP: NORMAL
PATHOLOGIST INTERPRETATION IFE: NORMAL
PATHOLOGIST INTERPRETATION SPE: NORMAL
PROT SERPL-MCNC: 7.4 G/DL (ref 6–8.4)

## 2025-02-26 ENCOUNTER — RESULTS FOLLOW-UP (OUTPATIENT)
Dept: INTERNAL MEDICINE | Facility: CLINIC | Age: 85
End: 2025-02-26

## 2025-03-20 ENCOUNTER — OFFICE VISIT (OUTPATIENT)
Dept: FAMILY MEDICINE | Facility: CLINIC | Age: 85
End: 2025-03-20
Payer: MEDICARE

## 2025-03-20 VITALS — HEART RATE: 100 BPM | HEIGHT: 61 IN | BODY MASS INDEX: 28.01 KG/M2 | OXYGEN SATURATION: 98 % | WEIGHT: 148.38 LBS

## 2025-03-20 DIAGNOSIS — B35.1 ONYCHOMYCOSIS: ICD-10-CM

## 2025-03-20 DIAGNOSIS — L29.9 PRURITUS: Primary | ICD-10-CM

## 2025-03-20 PROCEDURE — 1160F RVW MEDS BY RX/DR IN RCRD: CPT | Mod: CPTII,S$GLB,,

## 2025-03-20 PROCEDURE — 1157F ADVNC CARE PLAN IN RCRD: CPT | Mod: CPTII,S$GLB,,

## 2025-03-20 PROCEDURE — 1101F PT FALLS ASSESS-DOCD LE1/YR: CPT | Mod: CPTII,S$GLB,,

## 2025-03-20 PROCEDURE — 99214 OFFICE O/P EST MOD 30 MIN: CPT | Mod: S$GLB,,,

## 2025-03-20 PROCEDURE — 1159F MED LIST DOCD IN RCRD: CPT | Mod: CPTII,S$GLB,,

## 2025-03-20 PROCEDURE — 1126F AMNT PAIN NOTED NONE PRSNT: CPT | Mod: CPTII,S$GLB,,

## 2025-03-20 PROCEDURE — 3288F FALL RISK ASSESSMENT DOCD: CPT | Mod: CPTII,S$GLB,,

## 2025-03-20 PROCEDURE — 99999 PR PBB SHADOW E&M-EST. PATIENT-LVL IV: CPT | Mod: PBBFAC,,,

## 2025-03-20 RX ORDER — HYDROXYZINE HYDROCHLORIDE 25 MG/1
25 TABLET, FILM COATED ORAL 3 TIMES DAILY PRN
Qty: 60 TABLET | Refills: 0 | Status: SHIPPED | OUTPATIENT
Start: 2025-03-20

## 2025-03-20 RX ORDER — PRENATAL VIT 91/IRON/FOLIC/DHA 28-975-200
COMBINATION PACKAGE (EA) ORAL 2 TIMES DAILY
Qty: 15 G | Refills: 0 | Status: SHIPPED | OUTPATIENT
Start: 2025-03-20

## 2025-03-20 NOTE — PROGRESS NOTES
"  [facility]    3/20/2025      Subjective:       Patient ID:  Kassandra is a 84 y.o. female .  has a past medical history of Hearing loss and Hypertension.    History of Present Illness    CHIEF COMPLAINT:  Kassandra presents today for itching and skin discoloration    SKIN CONCERNS:  She reports dark discoloration and itching under the stomach fold. The affected area becomes pierced when scratched, with some discharge. She has attempted treatment with peroxide to dry and sanitize the area, and Vaseline, both without relief. She notes recent changes in soap usage. She denies excessive sweating but uses absorbent material under the stomach fold to manage moisture. No bumps are present, but the area appears cut-like due to scratching and rubbing. She expresses concern about persistent skin darkening.    ONYCHOMYCOSIS:  She reports fungal infection under both great toenails with abnormal tissue underneath. She describes soreness in the affected toes. Self-treatment attempts including nail clipping, soaking, and tissue removal have been ineffective. She receives regular pedicures where the nails are trimmed but the underlying condition remains untreated. She reports difficulty cutting her toenails due to increased thickness.      ROS:  Constitutional: -chills, -fever  Respiratory: -cough, -shortness of breath  Cardiovascular: -chest pain  Gastrointestinal: -abdominal pain, -constipation, -diarrhea, -nausea, -vomiting  Neurological: -dizziness, -lightheadedness, -headaches  Skin: +itching, +skin lesion, +toenail thickening, +nail yellowing  Musculoskeletal: +limb pain           Problem List[1]      Last HgbA1C:    No results found for: "HGBA1C"      Last Lipid Panel:    Lab Results   Component Value Date    HDL 50 01/13/2025    HDL 48 11/14/2022    HDL 48 02/11/2021       Lab Results   Component Value Date    LDLCALC 147.0 01/13/2025    LDLCALC 155.2 11/14/2022    LDLCALC 153.0 02/11/2021 " "      Lab Results   Component Value Date    TRIG 80 01/13/2025    TRIG 79 11/14/2022    TRIG 70 02/11/2021       Lab Results   Component Value Date    CHOLHDL 23.5 01/13/2025    CHOLHDL 21.9 11/14/2022    CHOLHDL 22.3 02/11/2021         Review of patient's allergies indicates:   Allergen Reactions    Penicillins Other (See Comments)        Medication List with Changes/Refills   New Medications    HYDROXYZINE HCL (ATARAX) 25 MG TABLET    Take 1 tablet (25 mg total) by mouth 3 (three) times daily as needed for Itching.    TERBINAFINE HCL (ANTIFUNGAL, TERBINAFINE,) 1 % CREAM    Apply topically 2 (two) times daily.   Current Medications    ACETAMINOPHEN (TYLENOL) 500 MG TABLET    Take 500 mg by mouth every 6 (six) hours as needed for Pain.    AMLODIPINE (NORVASC) 5 MG TABLET    Take 1 tablet by mouth once daily    ASPIRIN (ECOTRIN) 81 MG EC TABLET    Take 81 mg by mouth once daily.    CALCIUM-VITAMIN D 250-100 MG-UNIT PER TABLET    Take 1 tablet by mouth 2 (two) times daily.    FLUTICASONE PROPIONATE (FLONASE) 50 MCG/ACTUATION NASAL SPRAY    1 spray (50 mcg total) by Each Nostril route once daily.    LEVOCETIRIZINE (XYZAL) 5 MG TABLET    Take 1 tablet (5 mg total) by mouth every evening.    MEMANTINE (NAMENDA) 5 MG TAB    Take 1 tablet (5 mg total) by mouth 2 (two) times daily.    MULTIVIT-MINS NO.63/IRON/FOLIC (M-VIT ORAL)    Take 1 tablet by mouth.    MULTIVITAMIN-MINERALS-LUTEIN TAB    Take 1 tablet by mouth once daily.    VITAMIN D (VITAMIN D3) 1000 UNITS TAB    Take 1,000 Units by mouth once daily.               Objective:      BP (P) 124/60 (BP Location: Left arm, Patient Position: Sitting)   Pulse 100   Ht 5' 1" (1.549 m)   Wt 67.3 kg (148 lb 5.9 oz)   LMP  (LMP Unknown)   SpO2 98%   BMI 28.03 kg/m²   Estimated body mass index is 28.03 kg/m² as calculated from the following:    Height as of this encounter: 5' 1" (1.549 m).    Weight as of this encounter: 67.3 kg (148 lb 5.9 oz).    Physical Exam  Vitals " reviewed.   Constitutional:       Appearance: Normal appearance.   HENT:      Head: Normocephalic and atraumatic.      Mouth/Throat:      Mouth: Mucous membranes are moist.   Eyes:      Conjunctiva/sclera: Conjunctivae normal.   Cardiovascular:      Rate and Rhythm: Normal rate.   Pulmonary:      Effort: Pulmonary effort is normal. No respiratory distress.   Musculoskeletal:      Cervical back: Normal range of motion.   Feet:      Right foot:      Toenail Condition: Right toenails are abnormally thick.      Left foot:      Toenail Condition: Left toenails are abnormally thick.      Comments: bilateral hallux  Skin:     General: Skin is warm.      Findings: No lesion or rash.   Neurological:      Mental Status: She is alert.             Assessment and Plan:   1. Pruritus  - hydrOXYzine HCL (ATARAX) 25 MG tablet; Take 1 tablet (25 mg total) by mouth 3 (three) times daily as needed for Itching.  Dispense: 60 tablet; Refill: 0    2. Onychomycosis  - Ambulatory referral/consult to Podiatry; Future  - terbinafine HCL (ANTIFUNGAL, TERBINAFINE,) 1 % cream; Apply topically 2 (two) times daily.  Dispense: 15 g; Refill: 0     Assessment & Plan    IMPRESSION:  - Assessed itching complaint, noting darkened skin under stomach without visible bumps.  - Evaluated toenail fungus on both great toes, observing discoloration and thickened nails.    DERMATITIS AND PRURITUS:  - Prescribed oral antihistamine for itching, to be taken as needed.  - Cautioned the patient about potential drowsiness.  - Recommend OTC Benadryl cream for topical itch relief.  - Instructed the patient to apply to itchy areas as a first-line treatment.  - Considered potential causes such as allergies or changes in soaps and detergents.    ONYCHOMYCOSIS (TINEA UNGUIUM):  - Explained that onychomycosis treatment typically requires several months for improvement.  - Initiated topical antifungal treatment for toenails  - Observed fungal infection under both hallux  toenails, with one toe potentially having a melanotic spot  - Kassandra reports onychomycosis under both hallux toenails, with one toe being more affected than the other.  - Kassandra has been attempting self-treatment by clipping and gouging the affected nails.    FOLLOW-UP AND REFERRALS:  - Referred the patient to podiatry for nail care and further evaluation of onychomycosis.  - Follow up as scheduled         The patient was informed of the following statements     Emergency Care:Seek immediate medical attention in the emergency room if you experience any new or worsening symptoms, or if your current condition significantly changes or becomes more severe.  Patient Acknowledgment: Patient verbalizes understanding of the plan and agrees to proceed with the recommended care.    Follow Up:  10/3/2025 Jess Aggarwal PA-C                  No follow-ups on file.    Other Orders Placed This Visit:  Orders Placed This Encounter   Procedures    Ambulatory referral/consult to Podiatry         This note was generated with the assistance of ambient listening technology. Verbal consent was obtained by the patient and accompanying visitor(s) for the recording of patient appointment to facilitate this note. I attest to having reviewed and edited the generated note for accuracy, though some syntax or spelling errors may persist. Please contact the author of this note for any clarification.        Jess Aggarwal PA-C        I spent a total of 30 minutes on the day of the visit.This includes face to face time and non-face to face time preparing to see the patient (eg, review of tests), obtaining and/or reviewing separately obtained history, documenting clinical information in the electronic or other health record, independently interpreting results and communicating results to the patient/family/caregiver, or care coordinator.          [1]   Patient Active Problem List  Diagnosis    Essential hypertension    History of Guillain-Ralls  syndrome    Atrophic vaginitis    Sensorineural hearing loss (SNHL) of left ear with restricted hearing of right ear    Aortic atherosclerosis    Osteopenia

## 2025-05-16 ENCOUNTER — OFFICE VISIT (OUTPATIENT)
Dept: FAMILY MEDICINE | Facility: CLINIC | Age: 85
End: 2025-05-16
Payer: MEDICARE

## 2025-05-16 ENCOUNTER — PATIENT MESSAGE (OUTPATIENT)
Dept: FAMILY MEDICINE | Facility: CLINIC | Age: 85
End: 2025-05-16

## 2025-05-16 ENCOUNTER — LAB VISIT (OUTPATIENT)
Dept: LAB | Facility: HOSPITAL | Age: 85
End: 2025-05-16
Attending: INTERNAL MEDICINE
Payer: MEDICARE

## 2025-05-16 ENCOUNTER — OFFICE VISIT (OUTPATIENT)
Dept: PODIATRY | Facility: CLINIC | Age: 85
End: 2025-05-16
Payer: MEDICARE

## 2025-05-16 ENCOUNTER — TELEPHONE (OUTPATIENT)
Dept: PODIATRY | Facility: CLINIC | Age: 85
End: 2025-05-16

## 2025-05-16 VITALS — DIASTOLIC BLOOD PRESSURE: 72 MMHG | HEART RATE: 75 BPM | SYSTOLIC BLOOD PRESSURE: 123 MMHG

## 2025-05-16 VITALS
HEIGHT: 61 IN | HEART RATE: 79 BPM | SYSTOLIC BLOOD PRESSURE: 122 MMHG | DIASTOLIC BLOOD PRESSURE: 78 MMHG | OXYGEN SATURATION: 98 % | BODY MASS INDEX: 27.18 KG/M2 | WEIGHT: 143.94 LBS

## 2025-05-16 DIAGNOSIS — H53.8 BLURRY VISION, RIGHT EYE: ICD-10-CM

## 2025-05-16 DIAGNOSIS — B35.1 ONYCHOMYCOSIS: Primary | ICD-10-CM

## 2025-05-16 DIAGNOSIS — H92.09 EARACHE: ICD-10-CM

## 2025-05-16 DIAGNOSIS — M43.6 TORTICOLLIS: Primary | ICD-10-CM

## 2025-05-16 LAB — ERYTHROCYTE [SEDIMENTATION RATE] IN BLOOD BY PHOTOMETRIC METHOD: 35 MM/HR

## 2025-05-16 PROCEDURE — 85652 RBC SED RATE AUTOMATED: CPT

## 2025-05-16 PROCEDURE — 99999 PR PBB SHADOW E&M-EST. PATIENT-LVL III: CPT | Mod: PBBFAC,,, | Performed by: STUDENT IN AN ORGANIZED HEALTH CARE EDUCATION/TRAINING PROGRAM

## 2025-05-16 PROCEDURE — 36415 COLL VENOUS BLD VENIPUNCTURE: CPT | Mod: PO

## 2025-05-16 PROCEDURE — 87101 SKIN FUNGI CULTURE: CPT | Performed by: STUDENT IN AN ORGANIZED HEALTH CARE EDUCATION/TRAINING PROGRAM

## 2025-05-16 PROCEDURE — 99999 PR PBB SHADOW E&M-EST. PATIENT-LVL IV: CPT | Mod: PBBFAC,,, | Performed by: INTERNAL MEDICINE

## 2025-05-16 NOTE — TELEPHONE ENCOUNTER
Dr. Cade, please see pt message. [facility] called her and she is stating it is expensive for her. Please let us know some alternatives, thank you

## 2025-05-16 NOTE — PROGRESS NOTES
Subjective:     Patient ID: Kassandra Rodriguez is a 84 y.o. female.    Chief Complaint: Nail Problem and Nail Care    Kassandra BOBO is a 84 y.o. female who presents to the clinic complaining of thick and discolored toenails on both feet. Kassandra BOBO is inquiring about treatment options.    Review of Systems   Constitutional: Negative for chills, decreased appetite, diaphoresis and fever.   HENT:  Negative for congestion and hearing loss.    Cardiovascular:  Negative for chest pain, claudication, leg swelling and syncope.   Respiratory:  Negative for cough and shortness of breath.    Skin:  Positive for dry skin and nail changes. Negative for color change, flushing, itching, poor wound healing and rash.   Musculoskeletal:  Negative for arthritis, back pain, joint pain and joint swelling.   Gastrointestinal:  Negative for nausea and vomiting.   Neurological:  Positive for paresthesias. Negative for focal weakness and weakness.   Psychiatric/Behavioral:  Negative for altered mental status. The patient is not nervous/anxious.         Objective:     Physical Exam  Constitutional:       General: She is not in acute distress.     Appearance: She is well-developed. She is not diaphoretic.   Cardiovascular:      Comments: Dorsalis pedis and posterior tibial pulses are within normal limits. Skin temperature is within normal limits. Toes are cool to touch and feet are warm proximally. Hair growth is within normal limits. Skin is normotrophic and without hyperpigmentation. No edema noted. No spider veins or varicosities noted, bilaterally.   Musculoskeletal:         General: No tenderness.      Comments: Adequate joint range of motion without pain, limitation, nor crepitation to bilateral feet and ankle joints. Muscle strength is 5/5 in all groups bilaterally.       Lymphadenopathy:      Comments: Negative lymphangitic streaking    Skin:     General: Skin is warm and dry.      Findings: No lesion.      Comments: Skin is warm and dry, no acute  "signs of infection noted. No open wounds, macerations or hyperkeratotic lesions, bilaterally.     Toenails are thickened by 2-4 mm's, dystrophic, and are darkened in coloration with subungual fungal debris, bilaterally.  Skin is very dry, bilaterally.      Neurological:      Mental Status: She is alert and oriented to person, place, and time.      Motor: No abnormal muscle tone.      Comments: Light touch within normal limits.    Psychiatric:         Behavior: Behavior normal.         Thought Content: Thought content normal.         Judgment: Judgment normal.           Assessment:      Encounter Diagnosis   Name Primary?    Onychomycosis Yes     Plan:     Kassandra Morris" was seen today for nail problem and nail care.    Diagnoses and all orders for this visit:    Onychomycosis  -     Ambulatory referral/consult to Podiatry  -     Fungal culture , skin, hair, or nails      I counseled the patient on her conditions, their implications and medical management.  Discussed importance of keeping feet dry and changing socks and shoes on a regular basis to prevent spread of toenail fungus. Discussed treatment options for fungal toenails including topical home remedies, topical over the counter and prescription medications as well as oral prescription medications and laser treatment. All pros and cons discussed of each treatment. Patient elects for topical treatment. Script dispensed from [facility].   Toenails debrided 1-10, using sterile nail nippers, as a courtesy, without incident. Patient tolerated well.  I advised her on keeping nails neatly trimmed, removing all sharp and loose edges, filing the nail as necessary to prevent damage to nail plate and prevent unnecessary pulling of toenail. Also advised to avoid tight fitting shoes to prevent pressure related issues. Recommend shoes with wide toe box or open toed shoe to reduce pressure.   Return to clinic PRN  "

## 2025-05-16 NOTE — PATIENT INSTRUCTIONS
You can use the topical below  1. Greg-kate  2. Icy-hot  3. Aprecreme  4. Diclofenac  5. Salonpas    For more severe symptoms you can take the prescription medication

## 2025-05-16 NOTE — PROGRESS NOTES
"Assessment:         1. Torticollis    2. Earache    3. Blurry vision, right eye          Plan:           Kassandra Morris" was seen today for otalgia.    Diagnoses and all orders for this visit:    Torticollis    Earache  -     Sedimentation rate; Future    Blurry vision, right eye  -     Sedimentation rate; Future        Assessment & Plan    IMPRESSION:  Considered torticollis (muscle spasm of the neck) as primary diagnosis based on reported symptoms.  Differential diagnosis includes muscle strain vs. more serious inflammatory condition.  Plan to reassess based on lab results and symptom progression.    PATIENT EDUCATION:  - Explained anatomy of neck muscles and their connection to ear pain.  - Discussed potential causes of symptoms, including muscle spasm and less likely inflammatory conditions.    ACTION ITEMS/LIFESTYLE:  - Patient to apply topical treatments such as Bengay, Icy Hot, Tiger Balm, Biofreeze, or similar products to affected area.    MEDICATIONS:  - Started muscle relaxer (to be sent to pharmacy if ESR test is negative).    ORDERS:  - Ordered ESR (Erythrocyte Sedimentation Rate) test to check for inflammation and rule out potential inflammatory condition affecting blood vessels in the skull due to reported blurry vision, despite atypical presentation.    FOLLOW UP:  - Contact the office if ESR test results are elevated.  - Follow up early next week if ESR test is negative.             Subjective:       Patient ID: Kassandra Rodriguez is a 84 y.o. female.    Chief Complaint: Otalgia      Interim Hx  Concerns today  Chronic problems        History of Present Illness    CHIEF COMPLAINT:  Patient presents today for neck pain and ear discomfort.    NECK AND SHOULDER PAIN:  She reports neck and shoulder pain with radiation to the shoulder. She experiences difficulty with movement and lifting her arm, requiring her to move her entire body when turning her neck. She notes swelling on both right and left sides of the " "affected area. Pain is movement-dependent and can be exacerbated by light air or breeze.    PAIN MANAGEMENT:  Tylenol has provided effective pain relief. She has been using a heat pack for additional pain management. Other OTC remedies have not provided significant benefit.    EAR SYMPTOMS:  She reports ear discomfort that worsens with exposure to light air or breeze. She has been using cotton swabs in her ears and tried OTC ear drops without significant benefit. She denies ear drainage and hearing loss.    VISION:  She reports blurry vision on the right side.    PAST MEDICAL HISTORY:  Previous ESR test was normal. She has toenail fungus.      ROS:  Eyes: +blurry vision, +photophobia  Musculoskeletal: +muscle tension, +pain with movement, +neck pain  Head: +head pain           Review of Systems   All other systems reviewed and are negative.          Health Maintenance Due   Topic Date Due    RSV Vaccine (Age 60+ and Pregnant patients) (1 - 1-dose 75+ series) Never done         Objective:     /78 (BP Location: Right arm, Patient Position: Sitting)   Pulse 79   Ht 5' 1" (1.549 m)   Wt 65.3 kg (143 lb 15.4 oz)   LMP  (LMP Unknown)   SpO2 98%   BMI 27.20 kg/m²   .Physical Exam    Ears: Bilateral TMs clear. Right ear canal slightly erythematous.  Cardiovascular: Regular rate. Regular rhythm. No murmurs. No rubs. No gallops. Normal S1, S2.  Respiratory: Normal respiratory effort. Clear to auscultation bilaterally. No rales. No rhonchi. No wheezing.  Musculoskeletal: Posterior shoulder region tenderness (Right). Shoulder tenderness (Right). Tension in right posterior neck and top shoulder.  Neck: No lymphadenopathy.               5/16/2025     9:08 AM 3/20/2025     9:32 AM 2/24/2025     2:04 PM 1/24/2025     2:23 PM 10/3/2024     9:56 AM   Vitals   Height 5' 1" (1.549 m) 5' 1" (1.549 m)  5' 1" (1.549 m) 5' 1" (1.549 m)   Weight (lbs) 143.96 148.37 149.03 147.05 146.17   BMI (kg/m2) 27.2 28  27.8 27.6        "   Physical Exam  Vitals and nursing note reviewed.   Constitutional:       General: She is not in acute distress.     Appearance: She is well-developed. She is not diaphoretic.   HENT:      Head: Normocephalic.      Right Ear: Tympanic membrane normal. There is no impacted cerumen.      Left Ear: Tympanic membrane normal. There is no impacted cerumen.      Ears:      Comments: Narrow canal bilaterally  Pain with light touch   No temporal bruit      Nose: Nose normal.   Eyes:      General: No scleral icterus.        Right eye: No discharge.         Left eye: No discharge.      Extraocular Movements: Extraocular movements intact.      Conjunctiva/sclera: Conjunctivae normal.      Pupils: Pupils are equal, round, and reactive to light.      Comments: Visual acuity gross normal    Cardiovascular:      Rate and Rhythm: Normal rate and regular rhythm.      Heart sounds: Normal heart sounds. No murmur heard.     No friction rub. No gallop.   Pulmonary:      Effort: Pulmonary effort is normal. No respiratory distress.   Abdominal:      General: Bowel sounds are normal. There is no distension.      Palpations: Abdomen is soft.   Musculoskeletal:         General: No deformity. Normal range of motion.      Cervical back: Normal range of motion.   Skin:     General: Skin is warm.   Neurological:      Mental Status: She is alert and oriented to person, place, and time.      Cranial Nerves: No cranial nerve deficit.           No results found for this or any previous visit (from the past 2 weeks).    Lab Results   Component Value Date    SEDRATE 20 10/30/2023         Future Appointments   Date Time Provider Department Center   7/24/2025  8:15 AM LAB, [facility]   7/25/2025  1:40 PM Mesfin Perkins III, MD [facility]   10/3/2025  2:00 PM Jess Aggarwal PA-C [facility]       Medication List with Changes/Refills   Current Medications    ACETAMINOPHEN (TYLENOL) 500 MG TABLET    Take 500 mg by  mouth every 6 (six) hours as needed for Pain.    AMLODIPINE (NORVASC) 5 MG TABLET    Take 1 tablet by mouth once daily    ASPIRIN (ECOTRIN) 81 MG EC TABLET    Take 81 mg by mouth once daily.    CALCIUM-VITAMIN D 250-100 MG-UNIT PER TABLET    Take 1 tablet by mouth 2 (two) times daily.    FLUTICASONE PROPIONATE (FLONASE) 50 MCG/ACTUATION NASAL SPRAY    1 spray (50 mcg total) by Each Nostril route once daily.    HYDROXYZINE HCL (ATARAX) 25 MG TABLET    Take 1 tablet (25 mg total) by mouth 3 (three) times daily as needed for Itching.    LEVOCETIRIZINE (XYZAL) 5 MG TABLET    Take 1 tablet (5 mg total) by mouth every evening.    MEMANTINE (NAMENDA) 5 MG TAB    Take 1 tablet (5 mg total) by mouth 2 (two) times daily.    MULTIVIT-MINS NO.63/IRON/FOLIC (M-VIT ORAL)    Take 1 tablet by mouth.    MULTIVITAMIN-MINERALS-LUTEIN TAB    Take 1 tablet by mouth once daily.    TERBINAFINE HCL (ANTIFUNGAL, TERBINAFINE,) 1 % CREAM    Apply topically 2 (two) times daily.    VITAMIN D (VITAMIN D3) 1000 UNITS TAB    Take 1,000 Units by mouth once daily.         Disclaimer:  This note has been generated using voice-recognition software. There may be grammatical or spelling errors that have been missed during proof-reading   This note was generated with the assistance of ambient listening technology. Verbal consent was obtained by the patient and accompanying visitor(s) for the recording of patient appointment to facilitate this note. I attest to having reviewed and edited the generated note for accuracy, though some syntax or spelling errors may persist. Please contact the author of this note for any clarification.

## 2025-05-16 NOTE — TELEPHONE ENCOUNTER
----- Message from Piedad sent at 5/16/2025  1:44 PM CDT -----  Type:  Needs Medical AdviceWho Called: ptWould the patient rather a call back or a response via [facility]? callBest Call Back Number:  [phone] Information: pt states nail polish is to expensive would like to see about alternatives

## 2025-05-19 ENCOUNTER — RESULTS FOLLOW-UP (OUTPATIENT)
Dept: FAMILY MEDICINE | Facility: CLINIC | Age: 85
End: 2025-05-19

## 2025-05-19 ENCOUNTER — TELEPHONE (OUTPATIENT)
Dept: PODIATRY | Facility: CLINIC | Age: 85
End: 2025-05-19
Payer: MEDICARE

## 2025-05-19 NOTE — TELEPHONE ENCOUNTER
Called pt back and gave her the name from the fungal ointment Dr. Cade want her to buy over the counter, Kerasal multipurpose nailrepair

## 2025-06-05 ENCOUNTER — TELEPHONE (OUTPATIENT)
Dept: FAMILY MEDICINE | Facility: CLINIC | Age: 85
End: 2025-06-05
Payer: MEDICARE

## 2025-07-24 ENCOUNTER — LAB VISIT (OUTPATIENT)
Dept: LAB | Facility: HOSPITAL | Age: 85
End: 2025-07-24
Attending: INTERNAL MEDICINE
Payer: MEDICARE

## 2025-07-24 DIAGNOSIS — I70.0 AORTIC ATHEROSCLEROSIS: ICD-10-CM

## 2025-07-24 DIAGNOSIS — I10 ESSENTIAL HYPERTENSION: ICD-10-CM

## 2025-07-24 LAB
ALBUMIN SERPL BCP-MCNC: 3.5 G/DL (ref 3.5–5.2)
ALP SERPL-CCNC: 62 UNIT/L (ref 40–150)
ALT SERPL W/O P-5'-P-CCNC: 15 UNIT/L (ref 10–44)
ANION GAP (OHS): 6 MMOL/L (ref 8–16)
AST SERPL-CCNC: 31 UNIT/L (ref 11–45)
BILIRUB DIRECT SERPL-MCNC: 0.2 MG/DL (ref 0.1–0.3)
BILIRUB SERPL-MCNC: 0.4 MG/DL (ref 0.1–1)
BUN SERPL-MCNC: 12 MG/DL (ref 8–23)
CALCIUM SERPL-MCNC: 9 MG/DL (ref 8.7–10.5)
CHLORIDE SERPL-SCNC: 111 MMOL/L (ref 95–110)
CHOLEST SERPL-MCNC: 203 MG/DL (ref 120–199)
CHOLEST/HDLC SERPL: 4.3 {RATIO} (ref 2–5)
CO2 SERPL-SCNC: 27 MMOL/L (ref 23–29)
CREAT SERPL-MCNC: 0.8 MG/DL (ref 0.5–1.4)
GFR SERPLBLD CREATININE-BSD FMLA CKD-EPI: >60 ML/MIN/1.73/M2
GLUCOSE SERPL-MCNC: 89 MG/DL (ref 70–110)
HDLC SERPL-MCNC: 47 MG/DL (ref 40–75)
HDLC SERPL: 23.2 % (ref 20–50)
LDLC SERPL CALC-MCNC: 142 MG/DL (ref 63–159)
NONHDLC SERPL-MCNC: 156 MG/DL
POTASSIUM SERPL-SCNC: 4.1 MMOL/L (ref 3.5–5.1)
PROT SERPL-MCNC: 7.6 GM/DL (ref 6–8.4)
SODIUM SERPL-SCNC: 144 MMOL/L (ref 136–145)
TRIGL SERPL-MCNC: 70 MG/DL (ref 30–150)

## 2025-07-24 PROCEDURE — 82248 BILIRUBIN DIRECT: CPT

## 2025-07-24 PROCEDURE — 82310 ASSAY OF CALCIUM: CPT

## 2025-07-24 PROCEDURE — 36415 COLL VENOUS BLD VENIPUNCTURE: CPT | Mod: PO

## 2025-07-24 PROCEDURE — 80061 LIPID PANEL: CPT

## 2025-08-01 ENCOUNTER — OFFICE VISIT (OUTPATIENT)
Dept: FAMILY MEDICINE | Facility: CLINIC | Age: 85
End: 2025-08-01
Payer: MEDICARE

## 2025-08-01 VITALS
HEART RATE: 89 BPM | BODY MASS INDEX: 27.47 KG/M2 | SYSTOLIC BLOOD PRESSURE: 114 MMHG | HEIGHT: 61 IN | DIASTOLIC BLOOD PRESSURE: 68 MMHG | WEIGHT: 145.5 LBS | OXYGEN SATURATION: 99 %

## 2025-08-01 DIAGNOSIS — B37.9 CANDIDIASIS: Primary | ICD-10-CM

## 2025-08-01 DIAGNOSIS — Z12.31 ENCOUNTER FOR SCREENING MAMMOGRAM FOR MALIGNANT NEOPLASM OF BREAST: ICD-10-CM

## 2025-08-01 DIAGNOSIS — J30.1 ALLERGIC RHINITIS DUE TO POLLEN, UNSPECIFIED SEASONALITY: ICD-10-CM

## 2025-08-01 DIAGNOSIS — I70.0 AORTIC ATHEROSCLEROSIS: ICD-10-CM

## 2025-08-01 DIAGNOSIS — Z12.39 ENCOUNTER FOR SCREENING FOR MALIGNANT NEOPLASM OF BREAST, UNSPECIFIED SCREENING MODALITY: ICD-10-CM

## 2025-08-01 DIAGNOSIS — I10 ESSENTIAL HYPERTENSION: ICD-10-CM

## 2025-08-01 DIAGNOSIS — N89.8 VAGINAL ITCHING: ICD-10-CM

## 2025-08-01 PROCEDURE — 99999 PR PBB SHADOW E&M-EST. PATIENT-LVL IV: CPT | Mod: PBBFAC,,, | Performed by: INTERNAL MEDICINE

## 2025-08-01 RX ORDER — LEVOCETIRIZINE DIHYDROCHLORIDE 5 MG/1
5 TABLET, FILM COATED ORAL NIGHTLY
Qty: 30 TABLET | Refills: 11 | Status: SHIPPED | OUTPATIENT
Start: 2025-08-01 | End: 2026-08-01

## 2025-08-01 RX ORDER — FLUTICASONE PROPIONATE 50 MCG
1 SPRAY, SUSPENSION (ML) NASAL DAILY
Qty: 16 G | Refills: 0 | Status: SHIPPED | OUTPATIENT
Start: 2025-08-01

## 2025-08-01 RX ORDER — FLUCONAZOLE 150 MG/1
150 TABLET ORAL
Qty: 2 TABLET | Refills: 0 | Status: SHIPPED | OUTPATIENT
Start: 2025-08-01 | End: 2025-08-05

## 2025-08-01 RX ORDER — MONTELUKAST SODIUM 10 MG/1
10 TABLET ORAL NIGHTLY
Qty: 30 TABLET | Refills: 0 | Status: SHIPPED | OUTPATIENT
Start: 2025-08-01 | End: 2025-08-31

## 2025-08-01 NOTE — PROGRESS NOTES
"     Pre labs mild elevated lipids    Health maintenance none  Last seen May 2025-neck pain  Memantine ?? - incr   at [facility]  Dig med eligible htn   Sinus head--> regimen ??  Yeast infection --> skin a bit lichenfied - if negative maybe estrogen - and if no improvement --> obgyn         Assessment:         1. Candidiasis    2. Vaginal itching    3. Essential hypertension    4. Aortic atherosclerosis    5. Allergic rhinitis due to pollen, unspecified seasonality    6. Encounter for screening for malignant neoplasm of breast, unspecified screening modality    7. Encounter for screening mammogram for malignant neoplasm of breast          Plan:           Kassandra Morris" was seen today for follow-up.    Diagnoses and all orders for this visit:    Candidiasis  -     fluconazole (DIFLUCAN) 150 MG Tab; Take 1 tablet (150 mg total) by mouth every 72 hours. for 2 doses  -     Vaginosis Screen by DNA Probe    Vaginal itching  -     Vaginosis Screen by DNA Probe    Essential hypertension  -     Hypertension Digital Medicine (HDMP) Enrollment Order  -     Hepatic Function Panel; Standing  -     Basic Metabolic Panel; Standing    Aortic atherosclerosis  -     Hepatic Function Panel; Standing  -     Basic Metabolic Panel; Standing  -     Lipid Panel; Standing    Allergic rhinitis due to pollen, unspecified seasonality  -     levocetirizine (XYZAL) 5 MG tablet; Take 1 tablet (5 mg total) by mouth every evening.  -     fluticasone propionate (FLONASE) 50 mcg/actuation nasal spray; 1 spray (50 mcg total) by Each Nostril route once daily.  -     montelukast (SINGULAIR) 10 mg tablet; Take 1 tablet (10 mg total) by mouth every evening.    Encounter for screening for malignant neoplasm of breast, unspecified screening modality  -     Mammo Digital Screening Bilat w/ Bruno (XPD); Future    Encounter for screening mammogram for malignant neoplasm of breast  -     Mammo Digital Screening Bilat w/ Bruno (XPD); Future        Assessment & " Plan    IMPRESSION:  Suspect vaginal yeast infection based on reported discharge characteristics and performed vaginal swab to confirm diagnosis.  Evaluated sinus congestion, noting significant swelling in the nasal passages.  Reviewed current Namenda (memantine) dosage for memory concerns, determining that increasing the dose would not provide additional benefit for Alzheimer's prevention.  Noted skin changes in the vaginal area and educated on possibility of dry vaginal tissue post-menopause, considering potential need for estrogen cream if yeast infection treatment is ineffective.    PATIENT EDUCATION:  - Explained that Namenda does not prevent Alzheimer's progression, but may help with some symptoms.  - Mentioned studies showing potential benefits of Augusto Chi for dementia prevention.    ACTION ITEMS/LIFESTYLE:  - Discussed lifestyle factors that can help prevent cognitive decline, including puzzles, physical activity, outdoor time, and good sleep habits.  - Patient to continue daily morning walks for exercise.  - Recommend reducing ice cream consumption to help lower cholesterol.    MEDICATIONS:  - Started Diflucan: Take 1 pill, then another pill 3 days later for suspected yeast infection.  - Started sinus medication regimen (specific medications to be sent to patient).  - Continued Flonase nasal spray for sinus congestion.         FOLLOW UP   - mammogram   - fu in 6 month with prelabs     Subjective:       Patient ID: Kassandra Rodriguez is a 84 y.o. female.    Chief Complaint: Follow-up      Interim Hx  Concerns today  Chronic problems        History of Present Illness    CHIEF COMPLAINT:  Patient presents today with sinus headaches.    ENT:  She reports ongoing sinus headaches persisting for 3-4 weeks with associated nasal and throat congestion. Tylenol has been ineffective for headache relief. She has Flonase nasal spray at home for management of sinus symptoms. She denies fever, cough, vision changes, or other red  "flag symptoms.    NEUROLOGIC:  She reports ongoing memory concerns related to dementia. She continues [facility] for memory loss management and actively engages in cognitive maintenance strategies, including puzzles to help preserve cognitive function.    GENITOURINARY:  She reports vaginal discharge with a whitish appearance and foul odor, concerning for yeast infection.    WEIGHT MANAGEMENT:  Her current weight is 145 lbs, down from 160 lbs since initial visit.      ROS:  General: -fever  Eyes: -vision changes  ENT: +nasal congestion, +post nasal drip, +hearing loss, +difficulty hearing  Respiratory: -cough  Neurological: +headache, +memory loss  Head: +sinus pressure  Female Genitourinary: +vaginal discharge, +vaginal odor           Review of Systems        Health Maintenance Due   Topic Date Due    RSV Vaccine (Age 60+ and Pregnant patients) (1 - 1-dose 75+ series) Never done         Objective:     /68 (BP Location: Right arm, Patient Position: Sitting)   Pulse 89   Ht 5' 1" (1.549 m)   Wt 66 kg (145 lb 8.1 oz)   LMP  (LMP Unknown)   SpO2 99%   BMI 27.49 kg/m²   .Physical Exam    Vitals: Weight: 145 lbs.  HENT: Nasal turbinates very swollen posteriorly.  Female Genitourinary: Likenified vaginal tissue.               8/1/2025     7:17 AM 5/16/2025     9:08 AM 3/20/2025     9:32 AM 2/24/2025     2:04 PM 1/24/2025     2:23 PM   Vitals   Height 5' 1" (1.549 m) 5' 1" (1.549 m) 5' 1" (1.549 m)  5' 1" (1.549 m)   Weight (lbs) 145.5 143.96 148.37 149.03 147.05   BMI (kg/m2) 27.5 27.2 28  27.8          Physical Exam      Recent Results (from the past 2 weeks)   Lipid Panel    Collection Time: 07/24/25  7:51 AM   Result Value Ref Range    Cholesterol Total 203 (H) 120 - 199 mg/dL    Triglyceride 70 30 - 150 mg/dL    HDL Cholesterol 47 40 - 75 mg/dL    LDL Cholesterol 142.0 63.0 - 159.0 mg/dL    HDL/Cholesterol Ratio 23.2 20.0 - 50.0 %    Cholesterol/HDL Ratio 4.3 2.0 - 5.0    Non HDL Cholesterol 156 mg/dL   Basic " Metabolic Panel    Collection Time: 07/24/25  7:51 AM   Result Value Ref Range    Sodium 144 136 - 145 mmol/L    Potassium 4.1 3.5 - 5.1 mmol/L    Chloride 111 (H) 95 - 110 mmol/L    CO2 27 23 - 29 mmol/L    Glucose 89 70 - 110 mg/dL    BUN 12 8 - 23 mg/dL    Creatinine 0.8 0.5 - 1.4 mg/dL    Calcium 9.0 8.7 - 10.5 mg/dL    Anion Gap 6 (L) 8 - 16 mmol/L    eGFR >60 >60 mL/min/1.73/m2   Hepatic Function Panel    Collection Time: 07/24/25  7:51 AM   Result Value Ref Range    Protein Total 7.6 6.0 - 8.4 gm/dL    Albumin 3.5 3.5 - 5.2 g/dL    Bilirubin Total 0.4 0.1 - 1.0 mg/dL    Bilirubin Direct 0.2 0.1 - 0.3 mg/dL    ALP 62 40 - 150 unit/L    AST 31 11 - 45 unit/L    ALT 15 10 - 44 unit/L         Future Appointments   Date Time Provider Department Center   8/11/2025  8:00 AM [facility] MAMMO2 [facility] MAMMO [facility]   1/30/2026  7[facility]00 [facility]   2/2/2026  8:00 AM Jess Aggarwal PA-C [facility]         Medication List with Changes/Refills   New Medications    FLUCONAZOLE (DIFLUCAN) 150 MG TAB    Take 1 tablet (150 mg total) by mouth every 72 hours. for 2 doses    FLUTICASONE PROPIONATE (FLONASE) 50 MCG/ACTUATION NASAL SPRAY    1 spray (50 mcg total) by Each Nostril route once daily.    LEVOCETIRIZINE (XYZAL) 5 MG TABLET    Take 1 tablet (5 mg total) by mouth every evening.    MONTELUKAST (SINGULAIR) 10 MG TABLET    Take 1 tablet (10 mg total) by mouth every evening.   Current Medications    ACETAMINOPHEN (TYLENOL) 500 MG TABLET    Take 500 mg by mouth every 6 (six) hours as needed for Pain.    AMLODIPINE (NORVASC) 5 MG TABLET    Take 1 tablet by mouth once daily    ASPIRIN (ECOTRIN) 81 MG EC TABLET    Take 81 mg by mouth once daily.    CALCIUM-VITAMIN D 250-100 MG-UNIT PER TABLET    Take 1 tablet by mouth 2 (two) times daily.    FLUTICASONE PROPIONATE (FLONASE) 50 MCG/ACTUATION NASAL SPRAY    1 spray (50 mcg total) by Each Nostril route once daily.    HYDROXYZINE HCL (ATARAX) 25 MG  TABLET    Take 1 tablet (25 mg total) by mouth 3 (three) times daily as needed for Itching.    LEVOCETIRIZINE (XYZAL) 5 MG TABLET    Take 1 tablet (5 mg total) by mouth every evening.    MEMANTINE (NAMENDA) 5 MG TAB    Take 1 tablet (5 mg total) by mouth 2 (two) times daily.    MULTIVIT-MINS NO.63/IRON/FOLIC (M-VIT ORAL)    Take 1 tablet by mouth.    MULTIVITAMIN-MINERALS-LUTEIN TAB    Take 1 tablet by mouth once daily.    TERBINAFINE HCL (ANTIFUNGAL, TERBINAFINE,) 1 % CREAM    Apply topically 2 (two) times daily.    VITAMIN D (VITAMIN D3) 1000 UNITS TAB    Take 1,000 Units by mouth once daily.         Disclaimer:  This note has been generated using voice-recognition software. There may be grammatical or spelling errors that have been missed during proof-reading   This note was generated with the assistance of ambient listening technology. Verbal consent was obtained by the patient and accompanying visitor(s) for the recording of patient appointment to facilitate this note. I attest to having reviewed and edited the generated note for accuracy, though some syntax or spelling errors may persist. Please contact the author of this note for any clarification.

## 2025-08-12 ENCOUNTER — HOSPITAL ENCOUNTER (OUTPATIENT)
Dept: RADIOLOGY | Facility: HOSPITAL | Age: 85
Discharge: HOME | End: 2025-08-12
Attending: INTERNAL MEDICINE
Payer: MEDICARE

## 2025-08-12 DIAGNOSIS — Z12.39 ENCOUNTER FOR SCREENING FOR MALIGNANT NEOPLASM OF BREAST, UNSPECIFIED SCREENING MODALITY: ICD-10-CM

## 2025-08-12 DIAGNOSIS — Z12.31 ENCOUNTER FOR SCREENING MAMMOGRAM FOR MALIGNANT NEOPLASM OF BREAST: ICD-10-CM

## 2025-08-12 PROCEDURE — 77063 BREAST TOMOSYNTHESIS BI: CPT | Mod: TC
